# Patient Record
Sex: MALE | Race: WHITE | URBAN - METROPOLITAN AREA
[De-identification: names, ages, dates, MRNs, and addresses within clinical notes are randomized per-mention and may not be internally consistent; named-entity substitution may affect disease eponyms.]

---

## 2017-01-08 DIAGNOSIS — I10 HTN (HYPERTENSION): Primary | ICD-10-CM

## 2017-01-08 DIAGNOSIS — N18.4 CKD (CHRONIC KIDNEY DISEASE) STAGE 4, GFR 15-29 ML/MIN (H): ICD-10-CM

## 2017-01-09 RX ORDER — MINOXIDIL 2.5 MG/1
TABLET ORAL
Qty: 180 TABLET | Refills: 3 | Status: SHIPPED | OUTPATIENT
Start: 2017-01-09 | End: 2018-09-25

## 2017-01-09 NOTE — TELEPHONE ENCOUNTER
Last Office Visit with Nephrologist:  9/21/16.  Medication refilled per Nephrology Clinic protocol.     Heather Kent RN

## 2017-01-13 DIAGNOSIS — N18.4 CKD (CHRONIC KIDNEY DISEASE) STAGE 4, GFR 15-29 ML/MIN (H): Primary | ICD-10-CM

## 2017-01-13 NOTE — NURSING NOTE
Labs per clinic 2A protocol.  Follow up/CKD 4  Last OV: 9/21/16  Flavia Sandoval LPN  Nephrology  [facility]  [phone]

## 2017-01-19 DIAGNOSIS — I10 HTN (HYPERTENSION): Primary | ICD-10-CM

## 2017-01-19 RX ORDER — LOSARTAN POTASSIUM 100 MG/1
100 TABLET ORAL DAILY
Qty: 90 TABLET | Refills: 3 | Status: SHIPPED | OUTPATIENT
Start: 2017-01-19 | End: 2018-01-24

## 2017-01-25 ENCOUNTER — RESULTS ONLY (OUTPATIENT)
Dept: OTHER | Facility: CLINIC | Age: 72
End: 2017-01-25

## 2017-01-25 ENCOUNTER — OFFICE VISIT (OUTPATIENT)
Dept: NEPHROLOGY | Facility: CLINIC | Age: 72
End: 2017-01-25
Attending: INTERNAL MEDICINE
Payer: MEDICARE

## 2017-01-25 VITALS
WEIGHT: 230 LBS | HEIGHT: 71 IN | BODY MASS INDEX: 32.2 KG/M2 | HEART RATE: 76 BPM | DIASTOLIC BLOOD PRESSURE: 73 MMHG | SYSTOLIC BLOOD PRESSURE: 126 MMHG | OXYGEN SATURATION: 100 % | TEMPERATURE: 97.9 F

## 2017-01-25 DIAGNOSIS — N18.4 CKD (CHRONIC KIDNEY DISEASE) STAGE 4, GFR 15-29 ML/MIN (H): Primary | ICD-10-CM

## 2017-01-25 DIAGNOSIS — N18.4 TYPE 2 DIABETES MELLITUS WITH STAGE 4 CHRONIC KIDNEY DISEASE, WITH LONG-TERM CURRENT USE OF INSULIN (H): ICD-10-CM

## 2017-01-25 DIAGNOSIS — E11.22 TYPE 2 DIABETES MELLITUS WITH STAGE 4 CHRONIC KIDNEY DISEASE, WITH LONG-TERM CURRENT USE OF INSULIN (H): ICD-10-CM

## 2017-01-25 DIAGNOSIS — N18.4 CKD (CHRONIC KIDNEY DISEASE) STAGE 4, GFR 15-29 ML/MIN (H): ICD-10-CM

## 2017-01-25 DIAGNOSIS — Z79.4 TYPE 2 DIABETES MELLITUS WITH STAGE 4 CHRONIC KIDNEY DISEASE, WITH LONG-TERM CURRENT USE OF INSULIN (H): ICD-10-CM

## 2017-01-25 DIAGNOSIS — E11.9 DIABETES MELLITUS, TYPE 2 (H): ICD-10-CM

## 2017-01-25 DIAGNOSIS — N18.4 KIDNEY DISEASE, CHRONIC, STAGE IV (GFR 15-29 ML/MIN) (H): ICD-10-CM

## 2017-01-25 LAB
ALBUMIN SERPL-MCNC: 4.2 G/DL (ref 3.4–5)
ANION GAP SERPL CALCULATED.3IONS-SCNC: 13 MMOL/L (ref 3–14)
BUN SERPL-MCNC: 68 MG/DL (ref 7–30)
CALCIUM SERPL-MCNC: 8.7 MG/DL (ref 8.5–10.1)
CHLORIDE SERPL-SCNC: 100 MMOL/L (ref 94–109)
CO2 SERPL-SCNC: 24 MMOL/L (ref 20–32)
CREAT SERPL-MCNC: 3.48 MG/DL (ref 0.66–1.25)
ERYTHROCYTE [DISTWIDTH] IN BLOOD BY AUTOMATED COUNT: 14.7 % (ref 10–15)
GFR SERPL CREATININE-BSD FRML MDRD: 18 ML/MIN/1.7M2
GLUCOSE SERPL-MCNC: 295 MG/DL (ref 70–99)
HCT VFR BLD AUTO: 35.2 % (ref 40–53)
HGB BLD-MCNC: 11.9 G/DL (ref 13.3–17.7)
MCH RBC QN AUTO: 31.6 PG (ref 26.5–33)
MCHC RBC AUTO-ENTMCNC: 33.8 G/DL (ref 31.5–36.5)
MCV RBC AUTO: 94 FL (ref 78–100)
PHOSPHATE SERPL-MCNC: 5.3 MG/DL (ref 2.5–4.5)
PLATELET # BLD AUTO: 213 10E9/L (ref 150–450)
POTASSIUM SERPL-SCNC: 4.2 MMOL/L (ref 3.4–5.3)
RBC # BLD AUTO: 3.76 10E12/L (ref 4.4–5.9)
SODIUM SERPL-SCNC: 136 MMOL/L (ref 133–144)
WBC # BLD AUTO: 6.3 10E9/L (ref 4–11)

## 2017-01-25 PROCEDURE — 85027 COMPLETE CBC AUTOMATED: CPT | Performed by: INTERNAL MEDICINE

## 2017-01-25 PROCEDURE — 80069 RENAL FUNCTION PANEL: CPT | Performed by: INTERNAL MEDICINE

## 2017-01-25 PROCEDURE — 36415 COLL VENOUS BLD VENIPUNCTURE: CPT | Performed by: INTERNAL MEDICINE

## 2017-01-25 PROCEDURE — 86832 HLA CLASS I HIGH DEFIN QUAL: CPT | Performed by: TRANSPLANT SURGERY

## 2017-01-25 PROCEDURE — 99213 OFFICE O/P EST LOW 20 MIN: CPT | Mod: ZF

## 2017-01-25 PROCEDURE — 86833 HLA CLASS II HIGH DEFIN QUAL: CPT | Performed by: TRANSPLANT SURGERY

## 2017-01-25 ASSESSMENT — PAIN SCALES - GENERAL: PAINLEVEL: NO PAIN (0)

## 2017-01-25 NOTE — PROGRESS NOTES
Nephrology follow up    72yo wM with CKD, DM, hypertension here for follow up. No acute issues. Doing well without nausea, vomiting, CP, SOB, anorexia. 4pt ROS otherwise negative.     PMHx  PRINCE - uses CPAP  Hypertension  DM - retinopathy, denies neuropathy  CKD - stage 4 with proteinuria     - biopsy revealed diabetic nephropathy with arteriosclerosis with no evidence of MGUS related dz  MGUS - follows with [facility]  CVA  Anemia - on darbe, Fe    All/adverse effects - dizziness with medications (amlodipine, terazosin, hydralazine, carvedilol).  Medications reviewed - of note  Losartan 100mg daily  Furosemide 120mg BID  Minoxidil 2.5mg BID  Metoprolol 25mg BID  Atorvastatin 20 daily  darbe  Fe sulf 325mg BID  NaHCO3 650 BID  Insulin  No NSAIDs    PCP - Alexus [facility]    Exam   121/72   119/70   126/73   P76  Gen - nad  HEENT - neck supple  CV - rrr, +CARON, no rub  Resp - cta bilat  Ext - trace edema  Psych - pleasant, appropriate  Neuro - no asterixis    Labs noted             2010   2012   2014         2015                        2016  2017             5/10   8/21   2/11   9/22   2/11  7/1  7/28-8/1 11/4 9/21 1/25  Cr         1.6    1.3    2.3    2.5    2.1   2.7   4.9-3.1    3.3    2.7  3.5 (GFR 18)  Ualb/Cr 891    339    Assessment/Recommendations: 72yo wM with CKD and hypertension  CKD - stage 4 with proteinuria due to DM and hypertension. Biopsy did not reveal any MGUS related disease. No acute indication for RRT.   - avoid NSAIDs as possible    Hypertension - mild volume overloaded. BP well controlled (target <140/90 given DM)    - continue current antihypertensives    Anemia - stable    Ca/Phos/PTH - Ca/phos acceptable; Prior PTH elevated, vitamin D low; given polypharmacy and issues with adherence, will hold off for now.    Electrolytes - Na/K nl    RN visit in 3 months for renal panel, evaluate for signs/symptoms of uremia  RTC in 6mo for BP check, renal panel, CBC

## 2017-01-25 NOTE — Clinical Note
1/25/2017       RE: Austin Walker  [address] [address]     Dear Colleague,    Thank you for referring your patient, Austin Walker, to the [facility] NEPHROLOGY at [facility]. Please see a copy of my visit note below.    Nephrology follow up    72yo wM with CKD, DM, hypertension here for follow up. No acute issues. Doing well without nausea, vomiting, CP, SOB, anorexia. 4pt ROS otherwise negative.     PMHx  PRINCE - uses CPAP  Hypertension  DM - retinopathy, denies neuropathy  CKD - stage 4 with proteinuria     - biopsy revealed diabetic nephropathy with arteriosclerosis with no evidence of MGUS related dz  MGUS - follows with [facility]  CVA  Anemia - on darbe, Fe    All/adverse effects - dizziness with medications (amlodipine, terazosin, hydralazine, carvedilol).  Medications reviewed - of note  Losartan 100mg daily  Furosemide 120mg BID  Minoxidil 2.5mg BID  Metoprolol 25mg BID  Atorvastatin 20 daily  darbe  Fe sulf 325mg BID  NaHCO3 650 BID  Insulin  No NSAIDs    PCP - Dr Vivar, [place] [facility]    Exam   121/72   119/70   126/73   P76  Gen - nad  HEENT - neck supple  CV - rrr, +CARON, no rub  Resp - cta bilat  Ext - trace edema  Psych - pleasant, appropriate  Neuro - no asterixis    Labs noted             2010   2012   2014         2015                        2016  2017             5/10   8/21   2/11   9/22   2/11  7/1  7/28-8/1 11/4 9/21 1/25  Cr         1.6    1.3    2.3    2.5    2.1   2.7   4.9-3.1    3.3    2.7  3.5 (GFR 18)  Ualb/Cr 891    339    Assessment/Recommendations: 72yo wM with CKD and hypertension  CKD - stage 4 with proteinuria due to DM and hypertension. Biopsy did not reveal any MGUS related disease. No acute indication for RRT.   - avoid NSAIDs as possible    Hypertension - mild volume overloaded. BP well controlled (target <140/90 given DM)    - continue current antihypertensives    Anemia - stable    Ca/Phos/PTH - Ca/phos  acceptable; Prior PTH elevated, vitamin D low; given polypharmacy and issues with adherence, will hold off for now.    Electrolytes - Na/K nl    RN visit in 3 months for renal panel, evaluate for signs/symptoms of uremia  RTC in 6mo for BP check, renal panel, CBC        Again, thank you for allowing me to participate in the care of your patient.      Sincerely,    Basil Vyas MD

## 2017-01-25 NOTE — MR AVS SNAPSHOT
After Visit Summary   1/25/2017    Austin Walker    MRN: [id number]           Patient Information     Date Of Birth          1945        Visit Information        Provider Department      1/25/2017 1:30 PM Basil Vyas MD [facility] Nephrology        Today's Diagnoses     CKD (chronic kidney disease) stage 4, GFR 15-29 ml/min (H)    -  1     Type 2 diabetes mellitus with stage 4 chronic kidney disease, with long-term current use of insulin (H)            Follow-ups after your visit        Follow-up notes from your care team     Return in about 6 months (around 7/25/2017).      Your next 10 appointments already scheduled     Jan 25, 2017 12:30 PM   Lab with  LAB    [facility] ([facility])    [address] [place] [address]  [address] [facility] [address] [phone]            Jan 25, 2017  1:30 PM   (Arrive by 1:00 PM)   Return Visit with Basil Vyas MD   [facility] Nephrology ([facility])    [address] [place] [address] [phone]            Apr 26, 2017 10:30 AM   Nurse Visit with  NEPHROLOGY NURSE   [facility] Solid Organ Transplant ([facility])    [address] [place] [address] [phone]            Jul 26, 2017 12:00 PM   Lab with  LAB   [facility] ([facility])    [address] [place] [address] [phone]            Jul 26, 2017  1:00 PM   (Arrive by 12:30 PM)   Return Visit with Basil Vyas MD   [facility] Nephrology ([facility])    [address] [place] [address]  [address] [facility] [address] [phone]              Who to contact     If you have questions or need follow up information about today's clinic visit or your schedule please contact [facility] NEPHROLOGY directly at [phone].  Normal or non-critical lab and imaging results will be  "communicated to you by HappyBoxhart, letter or phone within 4 business days after the clinic has received the results. If you do not hear from us within 7 days, please contact the clinic through [employer] or phone. If you have a critical or abnormal lab result, we will notify you by phone as soon as possible.  Submit refill requests through [employer] or call your pharmacy and they will forward the refill request to us. Please allow 3 business days for your refill to be completed.          Additional Information About Your Visit        [employer] Information     [employer] gives you secure access to your electronic health record. If you see a primary care provider, you can also send messages to your care team and make appointments. If you have questions, please call your primary care clinic.  If you do not have a primary care provider, please call [phone] and they will assist you.        Care EveryWhere ID     This is your Care EveryWhere ID. This could be used by other organizations to access your [place] medical records  [id number]        Your Vitals Were     Pulse Temperature Height BMI (Body Mass Index) Pulse Oximetry       76 97.9  F (36.6  C) (Oral) 1.803 m (5' 11\") 32.09 kg/m2 100%        Blood Pressure from Last 3 Encounters:   01/25/17 126/73   11/28/16 139/70   09/21/16 127/74    Weight from Last 3 Encounters:   01/25/17 104.327 kg (230 lb)   09/21/16 103.783 kg (228 lb 12.8 oz)   06/27/16 105.507 kg (232 lb 9.6 oz)              Today, you had the following     No orders found for display         Today's Medication Changes          These changes are accurate as of: 1/25/17 12:21 PM.  If you have any questions, ask your nurse or doctor.               These medicines have changed or have updated prescriptions.        Dose/Directions    insulin detemir 100 UNIT/ML injection   Commonly known as:  LEVEMIR FLEXPEN/FLEXTOUCH   This may have changed:    - how much to take  - additional instructions   Used for:  Diabetes " mellitus, type 2 (H)        Dose:  10 Units   Inject 10 Units Subcutaneous 2 times daily Dose was originally 50 units BID but pt has been cutting back   Refills:  0                Primary Care Provider Office Phone # Fax #    Celso Esparza [phone]       [facility] [address]  [place] [facility] [address]        Thank you!     Thank you for choosing [facility] NEPHROLOGY  for your care. Our goal is always to provide you with excellent care. Hearing back from our patients is one way we can continue to improve our services. Please take a few minutes to complete the written survey that you may receive in the mail after your visit with us. Thank you!             Your Updated Medication List - Protect others around you: Learn how to safely use, store and throw away your medicines at www.disposemymeds.org.          This list is accurate as of: 1/25/17 12:21 PM.  Always use your most recent med list.                   Brand Name Dispense Instructions for use    amphetamine-dextroamphetamine 10 MG per tablet    ADDERALL     Take 10 mg by mouth 3 times daily (before meals)       ARANESP (ALBUMIN FREE) 300 MCG/ML Soln   Generic drug:  darbepoetin aaliyah-polysorbate      Inject 300 mcg Subcutaneous every 14 days As needed for hgb <=11g/dL       ASPIRIN EC PO      Take 81 mg by mouth daily       atorvastatin 20 MG tablet    LIPITOR     Take 20 mg by mouth every evening 1700       blood glucose monitoring lancets      3x/day with blood glucose testing       ferrous sulfate 325 (65 FE) MG tablet    IRON     Take 325 mg by mouth 2 times daily       FLONASE 50 MCG/ACT spray   Generic drug:  fluticasone      Spray 1 spray into both nostrils 2 times daily as needed       furosemide 40 MG tablet    LASIX    180 tablet    Take 3 tablets (120 mg) by mouth 2 times daily       gabapentin 300 MG capsule    NEURONTIN     Take 300 mg by mouth daily He takes 300mg about every 3 days        11/11/15  Pt  states he takes 300mg 2x/day       GLUCOSAMINE SULFATE PO      Take by mouth daily       insulin detemir 100 UNIT/ML injection    LEVEMIR FLEXPEN/FLEXTOUCH     Inject 10 Units Subcutaneous 2 times daily Dose was originally 50 units BID but pt has been cutting back       loratadine 10 MG tablet    CLARITIN     Take 10 mg by mouth daily       losartan 100 MG tablet    COZAAR    90 tablet    Take 1 tablet (100 mg) by mouth daily       meclizine 25 MG tablet    ANTIVERT     Take 25 mg by mouth 2 times daily       metoprolol 25 MG tablet    LOPRESSOR    180 tablet    Take 1 tablet (25 mg) by mouth 2 times daily       minoxidil 2.5 MG tablet    LONITEN    180 tablet    TAKE 2 TABLETS BY MOUTH DAILY.       MULTI-VITAMINS Tabs      Take 1 tablet by mouth daily       NUVIGIL PO      Take 200 mg by mouth every morning       omeprazole 20 MG CR capsule    priLOSEC     Take 20 mg by mouth daily       OSTEO BI-FLEX ADV TRIPLE ST PO      Take by mouth daily       sodium bicarbonate 650 MG tablet     120 tablet    Take 1 tablet (650 mg) by mouth 2 times daily       traZODone 50 MG tablet    DESYREL     Take 50 mg by mouth At Bedtime       UNABLE TO FIND      Take by mouth daily MEDICATION NAME: Brain Peak

## 2017-01-25 NOTE — NURSING NOTE
"Chief Complaint   Patient presents with     RECHECK     4 mo Ckd stage 4 follow up       Initial /73 mmHg  Pulse 76  Temp(Src) 97.9  F (36.6  C) (Oral)  Ht 1.803 m (5' 11\")  Wt 104.327 kg (230 lb)  BMI 32.09 kg/m2  SpO2 100% Estimated body mass index is 32.09 kg/(m^2) as calculated from the following:    Height as of this encounter: 1.803 m (5' 11\").    Weight as of this encounter: 104.327 kg (230 lb).  BP completed using cuff size: regular    "

## 2017-01-26 LAB — PRA SINGLE ANTIGEN IGG ANTIBODY: NORMAL

## 2017-01-31 LAB
SA1 CELL: NORMAL
SA1 COMMENTS: NORMAL
SA1 HI RISK ABY: NORMAL
SA1 MOD RISK ABY: NORMAL
SA1 TEST METHOD: NORMAL
SA2 CELL: NORMAL
SA2 COMMENTS: NORMAL
SA2 HI RISK ABY UA: NORMAL
SA2 MOD RISK ABY: NORMAL
SA2 TEST METHOD: NORMAL

## 2017-02-14 ENCOUNTER — TELEPHONE (OUTPATIENT)
Dept: NEPHROLOGY | Facility: CLINIC | Age: 72
End: 2017-02-14

## 2017-02-14 DIAGNOSIS — N18.4 CKD (CHRONIC KIDNEY DISEASE) STAGE 4, GFR 15-29 ML/MIN (H): ICD-10-CM

## 2017-02-14 DIAGNOSIS — I15.9 SECONDARY HYPERTENSION, UNSPECIFIED: ICD-10-CM

## 2017-02-14 RX ORDER — FUROSEMIDE 40 MG
120 TABLET ORAL 2 TIMES DAILY
Qty: 180 TABLET | Refills: 3 | Status: SHIPPED | OUTPATIENT
Start: 2017-02-14 | End: 2017-04-21

## 2017-02-14 NOTE — TELEPHONE ENCOUNTER
Medication refill per Clinic 2A protocol.  Furosemide  Associated DX: CKD 4  Last OV: 7/26/16  Next appointment: 1/25/17  Flavia Sandoval LPN  Nephrology  [facility]  [phone]

## 2017-03-10 ENCOUNTER — CARE COORDINATION (OUTPATIENT)
Dept: NEPHROLOGY | Facility: CLINIC | Age: 72
End: 2017-03-10

## 2017-03-22 ENCOUNTER — CARE COORDINATION (OUTPATIENT)
Dept: NEPHROLOGY | Facility: CLINIC | Age: 72
End: 2017-03-22

## 2017-03-23 NOTE — PROGRESS NOTES
Reason for Call    Checked in with patient on BP and symptoms. Currently does not check BP at home. Said he had a neck surgery yesterday (had fallen and broken a bone). Has swelling in the neck. Otherwise denied chest pain, shortness of breath, edema, or other uremic symptoms at this time.    Plan    1. Follow up at nurse visit as scheduled on 4/26  2. Call if any new symptoms    Patient was given an opportunity to ask questions and have those questions answered to his satisfaction.  Patient verbalized understanding of instructions provided and agreed to plan of care.    Heather Kent RN

## 2017-04-19 ENCOUNTER — ALLIED HEALTH/NURSE VISIT (OUTPATIENT)
Dept: TRANSPLANT | Facility: CLINIC | Age: 72
End: 2017-04-19
Attending: INTERNAL MEDICINE
Payer: MEDICARE

## 2017-04-19 VITALS — HEART RATE: 62 BPM | SYSTOLIC BLOOD PRESSURE: 129 MMHG | DIASTOLIC BLOOD PRESSURE: 71 MMHG

## 2017-04-19 DIAGNOSIS — I15.9 SECONDARY HYPERTENSION, UNSPECIFIED: ICD-10-CM

## 2017-04-19 DIAGNOSIS — N18.4 CKD (CHRONIC KIDNEY DISEASE) STAGE 4, GFR 15-29 ML/MIN (H): ICD-10-CM

## 2017-04-19 DIAGNOSIS — N18.4 CKD (CHRONIC KIDNEY DISEASE) STAGE 4, GFR 15-29 ML/MIN (H): Primary | ICD-10-CM

## 2017-04-19 LAB
ALBUMIN SERPL-MCNC: 3.6 G/DL (ref 3.4–5)
ANION GAP SERPL CALCULATED.3IONS-SCNC: 10 MMOL/L (ref 3–14)
BUN SERPL-MCNC: 51 MG/DL (ref 7–30)
CALCIUM SERPL-MCNC: 8.6 MG/DL (ref 8.5–10.1)
CHLORIDE SERPL-SCNC: 110 MMOL/L (ref 94–109)
CO2 SERPL-SCNC: 23 MMOL/L (ref 20–32)
CREAT SERPL-MCNC: 2.64 MG/DL (ref 0.66–1.25)
GFR SERPL CREATININE-BSD FRML MDRD: 24 ML/MIN/1.7M2
GLUCOSE SERPL-MCNC: 195 MG/DL (ref 70–99)
PHOSPHATE SERPL-MCNC: 3.2 MG/DL (ref 2.5–4.5)
POTASSIUM SERPL-SCNC: 4 MMOL/L (ref 3.4–5.3)
SODIUM SERPL-SCNC: 143 MMOL/L (ref 133–144)

## 2017-04-19 PROCEDURE — 36415 COLL VENOUS BLD VENIPUNCTURE: CPT | Performed by: INTERNAL MEDICINE

## 2017-04-19 PROCEDURE — 80069 RENAL FUNCTION PANEL: CPT | Performed by: INTERNAL MEDICINE

## 2017-04-19 NOTE — NURSING NOTE
Reason for Visit    Patient seen at the request of Dr. Vyas for BP check, labs, and uremic follow up.    Average BP today was 129/71 with a pulse of 62. Said he's doing very well. He is wearing a neck brace following injury, but denied pain or swelling. Denied any uremic symptoms at this time.    Wanted to know if he could cut down dose of lasix (120mg BID) as he has no swelling. Also prescribed losartan 100mg daily, minoxidil 5mg daily, and metoprolol 25mg BID.    Medication Review    Medication review completed. Taking all medications as prescribed.      Collaboration    Above discussed with Dr. Vyas.  Orders received.      Seems reasonable to go down to lasix 80mg BID.    Patient Education    1. Uremic symptoms and when to call clinic  2. Call this nurse if systolic (top number) blood pressure is consistently <110 or >160 for further instructions.     Plan    1. Decrease lasix to 80mg BID  2. Follow up with Dr. Vyas as scheduled.     Amount of time spent with patient 25 minutes.    Patient was given an opportunity to ask questions and have those questions answered to his satisfaction.  Patient verbalized understanding of instructions provided and agreed to plan of care.    Heather Kent RN

## 2017-04-19 NOTE — MR AVS SNAPSHOT
After Visit Summary   4/19/2017    Austin Walker    MRN: [id number]           Patient Information     Date Of Birth          1945        Visit Information        Provider Department      4/19/2017 10:30 AM  NEPHROLOGY NURSE [facility] Solid Organ Transplant        Today's Diagnoses     CKD (chronic kidney disease) stage 4, GFR 15-29 ml/min (H)        Secondary hypertension, unspecified           Follow-ups after your visit        Your next 10 appointments already scheduled     Jul 26, 2017 12:00 PM CDT   Lab with  LAB   [facility] Lab ([facility])    [address] [place] [address] [phone]            Jul 26, 2017  1:00 PM CDT   (Arrive by 12:30 PM)   Return Visit with Basil Vyas MD   [facility] Nephrology ([facility])    [address] [place] [address] [phone]              Who to contact     If you have questions or need follow up information about today's clinic visit or your schedule please contact [facility] directly at [phone].  Normal or non-critical lab and imaging results will be communicated to you by [employer], letter or phone within 4 business days after the clinic has received the results. If you do not hear from us within 7 days, please contact the clinic through [employer] or phone. If you have a critical or abnormal lab result, we will notify you by phone as soon as possible.  Submit refill requests through [employer] or call your pharmacy and they will forward the refill request to us. Please allow 3 business days for your refill to be completed.          Additional Information About Your Visit        MyChart Information     [employer] gives you secure access to your electronic health record. If you see a primary care provider, you can also send messages to your care team and make appointments. If you have questions, please call your primary  care clinic.  If you do not have a primary care provider, please call [phone] and they will assist you.        Care EveryWhere ID     This is your Care EveryWhere ID. This could be used by other organizations to access your [place] medical records  [id number]        Your Vitals Were     Pulse                   62            Blood Pressure from Last 3 Encounters:   04/19/17 129/71   01/25/17 126/73   11/28/16 139/70    Weight from Last 3 Encounters:   01/25/17 104.3 kg (230 lb)   09/21/16 103.8 kg (228 lb 12.8 oz)   06/27/16 105.5 kg (232 lb 9.6 oz)              Today, you had the following     No orders found for display         Today's Medication Changes          These changes are accurate as of: 4/19/17 11:59 PM.  If you have any questions, ask your nurse or doctor.               These medicines have changed or have updated prescriptions.        Dose/Directions    furosemide 40 MG tablet   Commonly known as:  LASIX   This may have changed:  how much to take   Used for:  CKD (chronic kidney disease) stage 4, GFR 15-29 ml/min (H), Secondary hypertension, unspecified        Dose:  80 mg   Take 2 tablets (80 mg) by mouth 2 times daily   Quantity:  360 tablet   Refills:  3       insulin detemir 100 UNIT/ML injection   Commonly known as:  LEVEMIR FLEXPEN/FLEXTOUCH   This may have changed:    - how much to take  - additional instructions   Used for:  Diabetes mellitus, type 2 (H)        Dose:  10 Units   Inject 10 Units Subcutaneous 2 times daily Dose was originally 50 units BID but pt has been cutting back   Refills:  0            Where to get your medicines      These medications were sent to [facility] - [facility] [address] AT [facility] [address]  [address], [facility] [address]     Phone:  [phone]     furosemide 40 MG tablet                Primary Care Provider Office Phone # Fax #    Celso Esparza [phone]        [facility] [address]  [place] [address]        Thank you!     Thank you for choosing [facility]  for your care. Our goal is always to provide you with excellent care. Hearing back from our patients is one way we can continue to improve our services. Please take a few minutes to complete the written survey that you may receive in the mail after your visit with us. Thank you!             Your Updated Medication List - Protect others around you: Learn how to safely use, store and throw away your medicines at www.disposemymeds.org.          This list is accurate as of: 4/19/17 11:59 PM.  Always use your most recent med list.                   Brand Name Dispense Instructions for use    amphetamine-dextroamphetamine 10 MG per tablet    ADDERALL     Take 10 mg by mouth 3 times daily (before meals)       ARANESP (ALBUMIN FREE) 300 MCG/ML Soln   Generic drug:  darbepoetin aaliyah-polysorbate      Inject 300 mcg Subcutaneous every 14 days As needed for hgb <=11g/dL       ASPIRIN EC PO      Take 81 mg by mouth daily       atorvastatin 20 MG tablet    LIPITOR     Take 20 mg by mouth every evening 1700       blood glucose monitoring lancets      3x/day with blood glucose testing       ferrous sulfate 325 (65 FE) MG tablet    IRON     Take 325 mg by mouth 2 times daily       FLONASE 50 MCG/ACT spray   Generic drug:  fluticasone      Spray 1 spray into both nostrils 2 times daily as needed       furosemide 40 MG tablet    LASIX    360 tablet    Take 2 tablets (80 mg) by mouth 2 times daily       gabapentin 300 MG capsule    NEURONTIN     Take 300 mg by mouth daily He takes 300mg about every 3 days        11/11/15  Pt states he takes 300mg 2x/day       GLUCOSAMINE SULFATE PO      Take by mouth daily       insulin detemir 100 UNIT/ML injection    LEVEMIR FLEXPEN/FLEXTOUCH     Inject 10 Units Subcutaneous 2 times daily Dose was originally 50 units BID but pt has been cutting back        loratadine 10 MG tablet    CLARITIN     Take 10 mg by mouth daily       losartan 100 MG tablet    COZAAR    90 tablet    Take 1 tablet (100 mg) by mouth daily       meclizine 25 MG tablet    ANTIVERT     Take 25 mg by mouth 2 times daily       metoprolol 25 MG tablet    LOPRESSOR    180 tablet    Take 1 tablet (25 mg) by mouth 2 times daily       minoxidil 2.5 MG tablet    LONITEN    180 tablet    TAKE 2 TABLETS BY MOUTH DAILY.       MULTI-VITAMINS Tabs      Take 1 tablet by mouth daily       NUVIGIL PO      Take 200 mg by mouth every morning       omeprazole 20 MG CR capsule    priLOSEC     Take 20 mg by mouth daily       OSTEO BI-FLEX ADV TRIPLE ST PO      Take by mouth daily       sodium bicarbonate 650 MG tablet     120 tablet    Take 1 tablet (650 mg) by mouth 2 times daily       traZODone 50 MG tablet    DESYREL     Take 50 mg by mouth At Bedtime       UNABLE TO FIND      Take by mouth daily MEDICATION NAME: Brain Peak

## 2017-04-21 RX ORDER — FUROSEMIDE 40 MG
80 TABLET ORAL 2 TIMES DAILY
Qty: 360 TABLET | Refills: 3 | Status: SHIPPED | OUTPATIENT
Start: 2017-04-21 | End: 2017-05-23

## 2017-05-23 ENCOUNTER — CARE COORDINATION (OUTPATIENT)
Dept: NEPHROLOGY | Facility: CLINIC | Age: 72
End: 2017-05-23

## 2017-05-23 DIAGNOSIS — I15.9 SECONDARY HYPERTENSION, UNSPECIFIED: ICD-10-CM

## 2017-05-23 DIAGNOSIS — N18.4 CKD (CHRONIC KIDNEY DISEASE) STAGE 4, GFR 15-29 ML/MIN (H): ICD-10-CM

## 2017-05-23 RX ORDER — FUROSEMIDE 20 MG
60 TABLET ORAL 2 TIMES DAILY
Qty: 540 TABLET | Refills: 3 | Status: SHIPPED | OUTPATIENT
Start: 2017-05-23 | End: 2017-12-20

## 2017-05-23 NOTE — PROGRESS NOTES
Reason for Call    Spoke with patient. States he's been okay following lasix decrease. Weight is stable around 233lbs. Average BP is 120's / 70's with pulse in the 60's. Notes urinary frequency, but is otherwise asymptomatic. Wondering if he can decrease lasix further.    Currently taking lasix 80mg BID, losartan 10mg daily, minoxidil 5mg daily, and metoprolol 25mg BID.    Collaboration    Above discussed with Dr. Vyas.  Orders received.    60mg BID would be ok. We could check labs again in June.     Patient Education    1. Uremic symptoms and when to call clinic.    Plan    1. Decrease lasix to 60mg BID  2. Repeat renal panel in June    Patient was given an opportunity to ask questions and have those questions answered to his satisfaction.  Patient verbalized understanding of instructions provided and agreed to plan of care.    Heather Kent RN

## 2017-06-08 ENCOUNTER — CARE COORDINATION (OUTPATIENT)
Dept: NEPHROLOGY | Facility: CLINIC | Age: 72
End: 2017-06-08

## 2017-06-08 NOTE — PROGRESS NOTES
Attempted to contact patient to follow up on BP. Line picked up but no one was there. Will try again at a later time.    Heather Kent RN

## 2017-06-09 NOTE — PROGRESS NOTES
Reason for Call    Spoke with patient. States he's been doing well following lasix decrease. No change in symptoms. Denied any swelling. BP steady around 120/70. He will have labs done this month.    Patient Education    1. Uremic symptoms and when to call clinic    Plan    1. Repeat BMP  2. Follow up in 3 weeks    Patient was given an opportunity to ask questions and have those questions answered to his satisfaction.  Patient verbalized understanding of instructions provided and agreed to plan of care.    Heather Kent RN

## 2017-06-22 DIAGNOSIS — I10 HYPERTENSION: ICD-10-CM

## 2017-06-22 DIAGNOSIS — N18.4 CKD (CHRONIC KIDNEY DISEASE) STAGE 4, GFR 15-29 ML/MIN (H): ICD-10-CM

## 2017-06-22 RX ORDER — MINOXIDIL 2.5 MG/1
TABLET ORAL
Qty: 60 TABLET | Refills: 11 | Status: SHIPPED | OUTPATIENT
Start: 2017-06-22 | End: 2018-08-22

## 2017-06-22 NOTE — TELEPHONE ENCOUNTER
Last Office Visit with Nephrologist:  1/25/17.  Medication refilled per Nephrology Clinic protocol.     Heather Kent RN

## 2017-06-30 ENCOUNTER — CARE COORDINATION (OUTPATIENT)
Dept: NEPHROLOGY | Facility: CLINIC | Age: 72
End: 2017-06-30

## 2017-06-30 NOTE — PROGRESS NOTES
Reason for Call    Patient states he's still doing well. BP steady around 120/70. Denied any symptoms at this time. Reminded him to have BMP drawn, he will try to go on Monday. Will continue to monitor    Plan    1. Repeat BMP  2. Follow up in 3-4 weeks    Patient was given an opportunity to ask questions and have those questions answered to his satisfaction.  Patient verbalized understanding of instructions provided and agreed to plan of care.    Heather Kent RN

## 2017-07-03 DIAGNOSIS — N18.4 CKD (CHRONIC KIDNEY DISEASE) STAGE 4, GFR 15-29 ML/MIN (H): ICD-10-CM

## 2017-07-03 DIAGNOSIS — I15.9 SECONDARY HYPERTENSION, UNSPECIFIED: ICD-10-CM

## 2017-07-03 LAB
ALBUMIN SERPL-MCNC: 3.8 G/DL (ref 3.4–5)
ANION GAP SERPL CALCULATED.3IONS-SCNC: 8 MMOL/L (ref 3–14)
BUN SERPL-MCNC: 46 MG/DL (ref 7–30)
CALCIUM SERPL-MCNC: 8.9 MG/DL (ref 8.5–10.1)
CHLORIDE SERPL-SCNC: 109 MMOL/L (ref 94–109)
CO2 SERPL-SCNC: 22 MMOL/L (ref 20–32)
CREAT SERPL-MCNC: 2.85 MG/DL (ref 0.66–1.25)
GFR SERPL CREATININE-BSD FRML MDRD: 22 ML/MIN/1.7M2
GLUCOSE SERPL-MCNC: 238 MG/DL (ref 70–99)
PHOSPHATE SERPL-MCNC: 3.3 MG/DL (ref 2.5–4.5)
POTASSIUM SERPL-SCNC: 4.1 MMOL/L (ref 3.4–5.3)
SODIUM SERPL-SCNC: 139 MMOL/L (ref 133–144)

## 2017-07-28 ENCOUNTER — CARE COORDINATION (OUTPATIENT)
Dept: NEPHROLOGY | Facility: CLINIC | Age: 72
End: 2017-07-28

## 2017-07-31 DIAGNOSIS — N18.4 CKD (CHRONIC KIDNEY DISEASE) STAGE 4, GFR 15-29 ML/MIN (H): Primary | ICD-10-CM

## 2017-07-31 NOTE — NURSING NOTE
Labs per clinic 2A protocol.  Follow up/CKD 4  Last OV: 1/25/17  Flavia Sandoval LPN  Nephrology  [facility]  [phone]

## 2017-08-04 DIAGNOSIS — N18.6 END STAGE RENAL DISEASE (H): ICD-10-CM

## 2017-08-04 DIAGNOSIS — Z76.82 ORGAN TRANSPLANT CANDIDATE: Primary | ICD-10-CM

## 2017-08-09 ENCOUNTER — RESULTS ONLY (OUTPATIENT)
Dept: OTHER | Facility: CLINIC | Age: 72
End: 2017-08-09

## 2017-08-09 ENCOUNTER — OFFICE VISIT (OUTPATIENT)
Dept: NEPHROLOGY | Facility: CLINIC | Age: 72
End: 2017-08-09
Attending: INTERNAL MEDICINE
Payer: MEDICARE

## 2017-08-09 VITALS
OXYGEN SATURATION: 98 % | HEIGHT: 71 IN | WEIGHT: 214.6 LBS | SYSTOLIC BLOOD PRESSURE: 130 MMHG | HEART RATE: 76 BPM | DIASTOLIC BLOOD PRESSURE: 73 MMHG | BODY MASS INDEX: 30.04 KG/M2 | TEMPERATURE: 98.1 F

## 2017-08-09 DIAGNOSIS — N18.6 END STAGE RENAL DISEASE (H): ICD-10-CM

## 2017-08-09 DIAGNOSIS — N18.4 CKD (CHRONIC KIDNEY DISEASE) STAGE 4, GFR 15-29 ML/MIN (H): ICD-10-CM

## 2017-08-09 DIAGNOSIS — Z79.4 TYPE 2 DIABETES MELLITUS WITH STAGE 4 CHRONIC KIDNEY DISEASE, WITH LONG-TERM CURRENT USE OF INSULIN (H): ICD-10-CM

## 2017-08-09 DIAGNOSIS — N18.4 CKD (CHRONIC KIDNEY DISEASE) STAGE 4, GFR 15-29 ML/MIN (H): Primary | ICD-10-CM

## 2017-08-09 DIAGNOSIS — Z76.82 ORGAN TRANSPLANT CANDIDATE: ICD-10-CM

## 2017-08-09 DIAGNOSIS — N18.4 TYPE 2 DIABETES MELLITUS WITH STAGE 4 CHRONIC KIDNEY DISEASE, WITH LONG-TERM CURRENT USE OF INSULIN (H): ICD-10-CM

## 2017-08-09 DIAGNOSIS — E11.22 TYPE 2 DIABETES MELLITUS WITH STAGE 4 CHRONIC KIDNEY DISEASE, WITH LONG-TERM CURRENT USE OF INSULIN (H): ICD-10-CM

## 2017-08-09 LAB
ALBUMIN SERPL-MCNC: 3.6 G/DL (ref 3.4–5)
ANION GAP SERPL CALCULATED.3IONS-SCNC: 10 MMOL/L (ref 3–14)
BUN SERPL-MCNC: 46 MG/DL (ref 7–30)
CALCIUM SERPL-MCNC: 8.4 MG/DL (ref 8.5–10.1)
CHLORIDE SERPL-SCNC: 108 MMOL/L (ref 94–109)
CO2 SERPL-SCNC: 18 MMOL/L (ref 20–32)
CREAT SERPL-MCNC: 2.77 MG/DL (ref 0.66–1.25)
ERYTHROCYTE [DISTWIDTH] IN BLOOD BY AUTOMATED COUNT: 13.5 % (ref 10–15)
FERRITIN SERPL-MCNC: 285 NG/ML (ref 26–388)
GFR SERPL CREATININE-BSD FRML MDRD: 23 ML/MIN/1.7M2
GLUCOSE SERPL-MCNC: 345 MG/DL (ref 70–99)
HCT VFR BLD AUTO: 29.4 % (ref 40–53)
HGB BLD-MCNC: 9.9 G/DL (ref 13.3–17.7)
IRON SATN MFR SERPL: 50 % (ref 15–46)
IRON SERPL-MCNC: 96 UG/DL (ref 35–180)
MCH RBC QN AUTO: 31.9 PG (ref 26.5–33)
MCHC RBC AUTO-ENTMCNC: 33.7 G/DL (ref 31.5–36.5)
MCV RBC AUTO: 95 FL (ref 78–100)
PHOSPHATE SERPL-MCNC: 2.6 MG/DL (ref 2.5–4.5)
PLATELET # BLD AUTO: 199 10E9/L (ref 150–450)
POTASSIUM SERPL-SCNC: 4.7 MMOL/L (ref 3.4–5.3)
RBC # BLD AUTO: 3.1 10E12/L (ref 4.4–5.9)
SODIUM SERPL-SCNC: 137 MMOL/L (ref 133–144)
TIBC SERPL-MCNC: 191 UG/DL (ref 240–430)
WBC # BLD AUTO: 3.9 10E9/L (ref 4–11)

## 2017-08-09 PROCEDURE — 86833 HLA CLASS II HIGH DEFIN QUAL: CPT | Performed by: TRANSPLANT SURGERY

## 2017-08-09 PROCEDURE — 83550 IRON BINDING TEST: CPT | Performed by: INTERNAL MEDICINE

## 2017-08-09 PROCEDURE — 99213 OFFICE O/P EST LOW 20 MIN: CPT | Mod: ZF

## 2017-08-09 PROCEDURE — 83540 ASSAY OF IRON: CPT | Performed by: INTERNAL MEDICINE

## 2017-08-09 PROCEDURE — 86832 HLA CLASS I HIGH DEFIN QUAL: CPT | Performed by: TRANSPLANT SURGERY

## 2017-08-09 PROCEDURE — 80069 RENAL FUNCTION PANEL: CPT | Performed by: INTERNAL MEDICINE

## 2017-08-09 PROCEDURE — 82728 ASSAY OF FERRITIN: CPT | Performed by: INTERNAL MEDICINE

## 2017-08-09 PROCEDURE — 85027 COMPLETE CBC AUTOMATED: CPT | Performed by: INTERNAL MEDICINE

## 2017-08-09 PROCEDURE — 36415 COLL VENOUS BLD VENIPUNCTURE: CPT | Performed by: INTERNAL MEDICINE

## 2017-08-09 RX ORDER — ZOLPIDEM TARTRATE 5 MG/1
5 TABLET ORAL
COMMUNITY
Start: 2017-07-26 | End: 2018-08-22

## 2017-08-09 ASSESSMENT — PAIN SCALES - GENERAL: PAINLEVEL: NO PAIN (0)

## 2017-08-09 NOTE — LETTER
8/9/2017      RE: Austin Walker  [address] [facility] [address]       Nephrology follow up    70yo wM with CKD, DM, hypertension here for follow up. Doing well without N/V, anorexia, CP, SOB, F/C. 4pt ROS otherwise negative.     PMHx  PRINCE - uses CPAP  Hypertension  DM - retinopathy, denies neuropathy  CKD - stage 4 with proteinuria     - biopsy revealed diabetic nephropathy with arteriosclerosis with no evidence of MGUS related dz  MGUS - follows with [facility]  CVA  Anemia - on darbe, Fe    All/adverse effects - dizziness with medications (amlodipine, terazosin, hydralazine, carvedilol).  Medications reviewed - of note  Losartan 100mg daily  Furosemide 60mg BID  Minoxidil 2.5mg BID  Metoprolol 25mg BID  Atorvastatin 20 daily  darbe  Fe sulf 325mg BID  NaHCO3 650 BID - not taking, not sure why he needed it  Insulin  No NSAIDs    PCP - Dr Vivar, [facility]    Exam   131/70   143/73   130/73   P76   98.1  Gen - nad  HEENT - neck supple  CV - rrr, +CARON, no rub  Resp - cta bilat  Ext - 1+ edema  Psych - pleasant, appropriate    Labs noted             2010   2012   2014     2015                        2016  2017             5/10   8/21   2/11   2/11  7/1  7/28-8/1 11/4 9/21 1/25 8/9  Cr         1.6    1.3    2.3    2.1   2.7   4.9-3.1    3.3    2.7  3.5  2.8 (GFR 23)  Ualb/Cr 891    339    Assessment/Recommendations: 70yo wM with CKD and hypertension  CKD - stage 4 with proteinuria due to DM and hypertension. Biopsy did not reveal any MGUS related disease. No acute indication for RRT.   - avoid NSAIDs as possible    Hypertension - mild volume overloaded. BP well controlled (target <140/90 given DM)    - continue current antihypertensives    Anemia - mgmt per anemia clinic.    Ca/Phos/PTH - Ca/phos acceptable; Prior PTH elevated, vitamin D low; given polypharmacy and issues with adherence, will hold off for now.    Electrolytes - Na/K nl    Acidosis - bicarb low, off replacement;  communicated rationale for bicarb supplements and encouraged him to restart    RTC in 4mo for BP check, renal panel, CBC    Basil Vyas MD

## 2017-08-09 NOTE — NURSING NOTE
"Chief Complaint   Patient presents with     RECHECK     Follow up CKD stage 4       Initial /73  Pulse 76  Temp 98.1  F (36.7  C) (Oral)  Ht 1.803 m (5' 11\")  Wt 97.3 kg (214 lb 9.6 oz)  SpO2 98%  BMI 29.93 kg/m2 Estimated body mass index is 29.93 kg/(m^2) as calculated from the following:    Height as of this encounter: 1.803 m (5' 11\").    Weight as of this encounter: 97.3 kg (214 lb 9.6 oz).  Medication Reconciliation: complete   Vanessa Carrasco., CMA    "

## 2017-08-09 NOTE — PROGRESS NOTES
Nephrology follow up    70yo wM with CKD, DM, hypertension here for follow up. Doing well without N/V, anorexia, CP, SOB, F/C. 4pt ROS otherwise negative.     PMHx  PRINCE - uses CPAP  Hypertension  DM - retinopathy, denies neuropathy  CKD - stage 4 with proteinuria     - biopsy revealed diabetic nephropathy with arteriosclerosis with no evidence of MGUS related dz  MGUS - follows with [facility]  CVA  Anemia - on darbe, Fe    All/adverse effects - dizziness with medications (amlodipine, terazosin, hydralazine, carvedilol).  Medications reviewed - of note  Losartan 100mg daily  Furosemide 60mg BID  Minoxidil 2.5mg BID  Metoprolol 25mg BID  Atorvastatin 20 daily  darbe  Fe sulf 325mg BID  NaHCO3 650 BID - not taking, not sure why he needed it  Insulin  No NSAIDs    PCP - [facility]    Exam   131/70   143/73   130/73   P76   98.1  Gen - nad  HEENT - neck supple  CV - rrr, +CARON, no rub  Resp - cta bilat  Ext - 1+ edema  Psych - pleasant, appropriate    Labs noted             2010   2012   2014     2015                        2016  2017             5/10   8/21   2/11   2/11  7/1  7/28-8/1 11/4 9/21 1/25 8/9  Cr         1.6    1.3    2.3    2.1   2.7   4.9-3.1    3.3    2.7  3.5  2.8 (GFR 23)  Ualb/Cr 891    339    Assessment/Recommendations: 70yo wM with CKD and hypertension  CKD - stage 4 with proteinuria due to DM and hypertension. Biopsy did not reveal any MGUS related disease. No acute indication for RRT.   - avoid NSAIDs as possible    Hypertension - mild volume overloaded. BP well controlled (target <140/90 given DM)    - continue current antihypertensives    Anemia - mgmt per anemia clinic.    Ca/Phos/PTH - Ca/phos acceptable; Prior PTH elevated, vitamin D low; given polypharmacy and issues with adherence, will hold off for now.    Electrolytes - Na/K nl    Acidosis - bicarb low, off replacement; communicated rationale for bicarb supplements and encouraged him to restart    RTC in 4mo for  BP check, renal panel, CBC

## 2017-08-09 NOTE — MR AVS SNAPSHOT
After Visit Summary   8/9/2017    Austin Walker    MRN: [id number]           Patient Information     Date Of Birth          1945        Visit Information        Provider Department      8/9/2017 2:00 PM Basil Vyas MD [facility]        Today's Diagnoses     CKD (chronic kidney disease) stage 4, GFR 15-29 ml/min (H)    -  1    Type 2 diabetes mellitus with stage 4 chronic kidney disease, with long-term current use of insulin (H)           Follow-ups after your visit        Follow-up notes from your care team     Return in about 4 months (around 12/9/2017).      Your next 10 appointments already scheduled     Aug 09, 2017  2:00 PM CDT   (Arrive by 1:30 PM)   Return Visit with MD LAUR[facility] ([facility])    [address] [address]   [phone]            Sep 11, 2017 10:00 AM CDT   Ech Dobutamine Stress Test with [id number]   [facility], [place],  [facility] ([facility], [facility])    [address] [facility] [address]   [phone]           1.  Please bring or wear a comfortable two-piece outfit and walking shoes. 2.  Stop eating 3 hours before the test. You may drink water or juice. 3.  Stop all caffeine 12 hours before the test. This includes coffee, tea, soda pop, chocolate and certain medicines (such as Anacin and Excederin). Also avoid decaf coffee and tea, as these contain small amounts of caffeine. 4.  No alcohol, smoking or use of other tobacco products for 12 hours before the test. 5.  Refer to your provider instructions to see if you need to stop any medications (such as beta-blockers or nitrates) for this test. 6.  For patients with diabetes: -   If you take insulin, call your diabetes care team. Ask if you should take a   dose the morning of your test. -   If you take diabetes medicine by mouth, don't take it on the morning of your test.  Bring it with you to take after the test.  (If you have questions, call your diabetes care team) 7.  When you arrive, please tell us if: -   You have diabetes. -   You have taken Viagra, Cialis or Levitra in the past 48 hours. 8.  For any questions that cannot be answered, please contact the ordering physician            Dec 13, 2017  1:30 PM CST   Lab with  LAB   [facility] ([facility])    [address]  1st Floor  [facility] [address] [phone]            Dec 13, 2017  2:30 PM CST   (Arrive by 2:00 PM)   Return Visit with Basil Vyas MD   [facility] ([facility])    [address]  3rd Floor  [place] [address] [phone]              Who to contact     If you have questions or need follow up information about today's clinic visit or your schedule please contact [facility] directly at [phone].  Normal or non-critical lab and imaging results will be communicated to you by [employer], letter or phone within 4 business days after the clinic has received the results. If you do not hear from us within 7 days, please contact the clinic through [employer] or phone. If you have a critical or abnormal lab result, we will notify you by phone as soon as possible.  Submit refill requests through [employer] or call your pharmacy and they will forward the refill request to us. Please allow 3 business days for your refill to be completed.          Additional Information About Your Visit        [employer] Information     [employer] gives you secure access to your electronic health record. If you see a primary care provider, you can also send messages to your care team and make appointments. If you have questions, please call your primary care clinic.  If you do not have a primary care provider, please call [phone] and they will assist you.        Care EveryWhere ID     This is your Care EveryWhere ID. This could be used by  "other organizations to access your [facility] medical records  [id number]        Your Vitals Were     Pulse Temperature Height Pulse Oximetry BMI (Body Mass Index)       76 98.1  F (36.7  C) (Oral) 1.803 m (5' 11\") 98% 29.93 kg/m2        Blood Pressure from Last 3 Encounters:   08/09/17 130/73   04/19/17 129/71   01/25/17 126/73    Weight from Last 3 Encounters:   08/09/17 97.3 kg (214 lb 9.6 oz)   01/25/17 104.3 kg (230 lb)   09/21/16 103.8 kg (228 lb 12.8 oz)              Today, you had the following     No orders found for display         Today's Medication Changes          These changes are accurate as of: 8/9/17  1:38 PM.  If you have any questions, ask your nurse or doctor.               These medicines have changed or have updated prescriptions.        Dose/Directions    insulin detemir 100 UNIT/ML injection   Commonly known as:  LEVEMIR FLEXPEN/FLEXTOUCH   This may have changed:    - how much to take  - additional instructions   Used for:  Diabetes mellitus, type 2 (H)        Dose:  10 Units   Inject 10 Units Subcutaneous 2 times daily Dose was originally 50 units BID but pt has been cutting back   Refills:  0                Primary Care Provider Office Phone # Fax #    Celso Esparza [phone]       [facility] [address] [id number]        Equal Access to Services     [facility] AH: Hadii lili grijalvao Socarl, waaxda luqadaha, qaybta kaalmada adeegyada, awilda leigh. So [facility] [phone].    ATENCIÓN: Si habla español, tiene a schaefer disposición servicios gratuitos de asistencia lingüística. Llame al [phone].    We comply with applicable federal civil rights laws and [place] laws. We do not discriminate on the basis of race, color, national origin, age, disability sex, sexual orientation or gender identity.            Thank you!     Thank you for choosing [facility]  for your care. Our goal is always to " provide you with excellent care. Hearing back from our patients is one way we can continue to improve our services. Please take a few minutes to complete the written survey that you may receive in the mail after your visit with us. Thank you!             Your Updated Medication List - Protect others around you: Learn how to safely use, store and throw away your medicines at www.disposemymeds.org.          This list is accurate as of: 8/9/17  1:38 PM.  Always use your most recent med list.                   Brand Name Dispense Instructions for use Diagnosis    amphetamine-dextroamphetamine 10 MG per tablet    ADDERALL     Take 10 mg by mouth 3 times daily (before meals)        ARANESP (ALBUMIN FREE) 300 MCG/ML Soln   Generic drug:  darbepoetin aaliyah-polysorbate      Inject 300 mcg Subcutaneous every 14 days As needed for hgb <=11g/dL        ASPIRIN EC PO      Take 81 mg by mouth daily        atorvastatin 20 MG tablet    LIPITOR     Take 20 mg by mouth every evening 1700        B-D U/F 31G X 8 MM   Generic drug:  insulin pen needle      USE AS DIRECTED        blood glucose monitoring lancets      3x/day with blood glucose testing        ferrous sulfate 325 (65 FE) MG tablet    IRON     Take 325 mg by mouth 2 times daily        FLONASE 50 MCG/ACT spray   Generic drug:  fluticasone      Spray 1 spray into both nostrils 2 times daily as needed        furosemide 20 MG tablet    LASIX    540 tablet    Take 3 tablets (60 mg) by mouth 2 times daily    CKD (chronic kidney disease) stage 4, GFR 15-29 ml/min (H), Secondary hypertension, unspecified       gabapentin 300 MG capsule    NEURONTIN     Take 300 mg by mouth daily He takes 300mg about every 3 days        11/11/15  Pt states he takes 300mg 2x/day        GLUCOSAMINE SULFATE PO      Take by mouth daily        insulin aspart 100 UNIT/ML injection    NovoLOG PEN     Inject subcutaneous 3 times daily with meals and bedtime?Medium Scale?less than 70 .................. Refer to  Hypoglycemia?Treatment Protocol?70 - 149 ......................... No corrective insulin?150 - 199 ....................... 2 units?200 - 249 ....................... 4 units?250 - 299 ....................... 6 units?300 - 349 ....................... 8 units?350 or greater ................ 10 units and notify MD        insulin detemir 100 UNIT/ML injection    LEVEMIR FLEXPEN/FLEXTOUCH     Inject 10 Units Subcutaneous 2 times daily Dose was originally 50 units BID but pt has been cutting back    Diabetes mellitus, type 2 (H)       loratadine 10 MG tablet    CLARITIN     Take 10 mg by mouth daily        losartan 100 MG tablet    COZAAR    90 tablet    Take 1 tablet (100 mg) by mouth daily    HTN (hypertension)       meclizine 25 MG tablet    ANTIVERT     Take 25 mg by mouth 2 times daily        metoprolol 25 MG tablet    LOPRESSOR    180 tablet    Take 1 tablet (25 mg) by mouth 2 times daily    HTN (hypertension)       * minoxidil 2.5 MG tablet    LONITEN    180 tablet    TAKE 2 TABLETS BY MOUTH DAILY.    HTN (hypertension), CKD (chronic kidney disease) stage 4, GFR 15-29 ml/min (H)       * minoxidil 2.5 MG tablet    LONITEN    60 tablet    TAKE 2 TABLETS BY MOUTH DAILY.    Hypertension, CKD (chronic kidney disease) stage 4, GFR 15-29 ml/min (H)       MULTI-VITAMINS Tabs      Take 1 tablet by mouth daily        NUVIGIL PO      Take 200 mg by mouth every morning        omeprazole 20 MG CR capsule    priLOSEC     Take 20 mg by mouth daily        OSTEO BI-FLEX ADV TRIPLE ST PO      Take by mouth daily        sodium bicarbonate 650 MG tablet     120 tablet    Take 1 tablet (650 mg) by mouth 2 times daily    CKD (chronic kidney disease)       traZODone 50 MG tablet    DESYREL     Take 50 mg by mouth At Bedtime        UNABLE TO FIND      Take by mouth daily MEDICATION NAME: Brain Peak        zolpidem 5 MG tablet    AMBIEN     Take 5 mg by mouth        * Notice:  This list has 2 medication(s) that are the same as other  medications prescribed for you. Read the directions carefully, and ask your doctor or other care provider to review them with you.

## 2017-08-14 DIAGNOSIS — I10 HTN (HYPERTENSION): ICD-10-CM

## 2017-08-14 LAB — PRA SINGLE ANTIGEN IGG ANTIBODY: NORMAL

## 2017-08-14 RX ORDER — METOPROLOL TARTRATE 25 MG/1
TABLET, FILM COATED ORAL
Qty: 180 TABLET | Refills: 1 | Status: SHIPPED | OUTPATIENT
Start: 2017-08-14 | End: 2018-01-22

## 2017-09-11 ENCOUNTER — TRANSFERRED RECORDS (OUTPATIENT)
Dept: HEALTH INFORMATION MANAGEMENT | Facility: CLINIC | Age: 72
End: 2017-09-11

## 2017-09-28 ENCOUNTER — CARE COORDINATION (OUTPATIENT)
Dept: NEPHROLOGY | Facility: CLINIC | Age: 72
End: 2017-09-28

## 2017-09-28 NOTE — PROGRESS NOTES
Attempted to contact patient to follow up on symptoms. Call rang out, voicemail full. Will try again at a later time.    Heather Kent RN

## 2017-09-29 NOTE — PROGRESS NOTES
Made second attempt to contact patient. Call rang out, voicemail full. Will try again.    Heather Kent RN

## 2017-10-09 DIAGNOSIS — Z76.82 ORGAN TRANSPLANT CANDIDATE: Primary | ICD-10-CM

## 2017-10-09 DIAGNOSIS — N18.6 END STAGE RENAL DISEASE (H): ICD-10-CM

## 2017-10-09 DIAGNOSIS — C61 PROSTATE CANCER (H): ICD-10-CM

## 2017-10-09 DIAGNOSIS — Z12.5 PROSTATE CANCER SCREENING: ICD-10-CM

## 2017-10-09 DIAGNOSIS — Z01.810 PRE-OPERATIVE CARDIOVASCULAR EXAMINATION: ICD-10-CM

## 2017-10-10 DIAGNOSIS — D47.2 MGUS (MONOCLONAL GAMMOPATHY OF UNKNOWN SIGNIFICANCE): Primary | ICD-10-CM

## 2017-10-16 ENCOUNTER — CARE COORDINATION (OUTPATIENT)
Dept: NEPHROLOGY | Facility: CLINIC | Age: 72
End: 2017-10-16

## 2017-10-16 DIAGNOSIS — Z76.82 ORGAN TRANSPLANT CANDIDATE: Primary | ICD-10-CM

## 2017-10-16 DIAGNOSIS — N18.6 END STAGE RENAL DISEASE (H): ICD-10-CM

## 2017-10-16 NOTE — PROGRESS NOTES
Attempted to reach patient to follow up on BP / symptoms. Call rang out, voicemail full. Will try again at a later time.    Heather Kent RN

## 2017-10-17 NOTE — PROGRESS NOTES
Made second attempt to reach patient. Went straight to voicemail, which is full.    Heather Kent RN

## 2017-11-01 ENCOUNTER — CARE COORDINATION (OUTPATIENT)
Dept: NEPHROLOGY | Facility: CLINIC | Age: 72
End: 2017-11-01

## 2017-11-01 NOTE — PROGRESS NOTES
Attempted to contact patient. Went straight to voicemail, which is full. Unable to leave a message. Will try again at a later time.    Heather Kent RN

## 2017-11-02 NOTE — PROGRESS NOTES
Made second attempt to contact patient. Line picked up, but no one was there. Will try again at a later time.    Heather Kent RN

## 2017-11-03 NOTE — PROGRESS NOTES
Reason for Call    Spoke with patient. Said BP has been elevated for 2 weeks. Averaging 180/80's, but heart rate is consistent in the 70's. He's been working to lose weight. Denied chest pain, shortness of breath, edema, headaches, or dizziness. Did take penicillin for 1 week for a broken tooth (pain has resolved). No other changes with medications.    Currently prescribed minoxidil 5mg daily, lasix 60mg BID, losartan 100mg daily, and metoprolol 25mg BID.    Collaboration    Above discussed with Dr. Vyas.     Let's have him come in for a nurse visit for a BP check and to evaluate volume status.     Plan    1. Schedule nurse visit  2. Follow up with Dr. Vyas as scheduled on 12/13    Patient was given an opportunity to ask questions and have those questions answered to his satisfaction.  Patient verbalized understanding of instructions provided and agreed to plan of care.    Heather Kent RN

## 2017-11-17 ENCOUNTER — TELEPHONE (OUTPATIENT)
Dept: TRANSPLANT | Facility: CLINIC | Age: 72
End: 2017-11-17

## 2017-11-17 ENCOUNTER — CARE COORDINATION (OUTPATIENT)
Dept: NEPHROLOGY | Facility: CLINIC | Age: 72
End: 2017-11-17

## 2017-11-17 NOTE — LETTER
November 20, 2017    Austin Walker  [address] [address]      Dear Austin Walker,    [facility] scheduling office has been trying to reach you to schedule your waitlist appointments.    Our transplant team has not been able to contact you at: [phone] or   [phone]    Please call  [phone] so we can arrange this important visit.  We look forward to hearing from you.      Thank you,    [facility]  [facility]

## 2017-11-20 NOTE — PROGRESS NOTES
Made second attempt to contact patient. Went straight to voicemail, unable to leave a message. Sent [employer] message.    Heather Kent RN

## 2017-12-05 ENCOUNTER — CARE COORDINATION (OUTPATIENT)
Dept: NEPHROLOGY | Facility: CLINIC | Age: 72
End: 2017-12-05

## 2017-12-05 NOTE — PROGRESS NOTES
Attempted to reach patient to discuss BP / nurse visit. No answer, voicemail is full. Will try again at a later time.    Heather Kent RN

## 2017-12-11 DIAGNOSIS — N18.4 CKD (CHRONIC KIDNEY DISEASE) STAGE 4, GFR 15-29 ML/MIN (H): Primary | ICD-10-CM

## 2017-12-20 ENCOUNTER — RESULTS ONLY (OUTPATIENT)
Dept: OTHER | Facility: CLINIC | Age: 72
End: 2017-12-20

## 2017-12-20 ENCOUNTER — OFFICE VISIT (OUTPATIENT)
Dept: NEPHROLOGY | Facility: CLINIC | Age: 72
End: 2017-12-20
Attending: INTERNAL MEDICINE
Payer: MEDICARE

## 2017-12-20 VITALS
HEART RATE: 101 BPM | BODY MASS INDEX: 29.73 KG/M2 | OXYGEN SATURATION: 97 % | TEMPERATURE: 99.1 F | HEIGHT: 71 IN | SYSTOLIC BLOOD PRESSURE: 157 MMHG | WEIGHT: 212.4 LBS | DIASTOLIC BLOOD PRESSURE: 78 MMHG

## 2017-12-20 DIAGNOSIS — N18.4 CKD (CHRONIC KIDNEY DISEASE) STAGE 4, GFR 15-29 ML/MIN (H): ICD-10-CM

## 2017-12-20 DIAGNOSIS — N18.6 END STAGE RENAL DISEASE (H): ICD-10-CM

## 2017-12-20 DIAGNOSIS — I15.0 RENOVASCULAR HYPERTENSION: Primary | ICD-10-CM

## 2017-12-20 DIAGNOSIS — Z01.810 PRE-OPERATIVE CARDIOVASCULAR EXAMINATION: ICD-10-CM

## 2017-12-20 DIAGNOSIS — D47.2 MGUS (MONOCLONAL GAMMOPATHY OF UNKNOWN SIGNIFICANCE): ICD-10-CM

## 2017-12-20 DIAGNOSIS — Z12.5 PROSTATE CANCER SCREENING: ICD-10-CM

## 2017-12-20 DIAGNOSIS — N18.4 TYPE 2 DIABETES MELLITUS WITH STAGE 4 CHRONIC KIDNEY DISEASE, WITH LONG-TERM CURRENT USE OF INSULIN (H): ICD-10-CM

## 2017-12-20 DIAGNOSIS — C61 PROSTATE CANCER (H): ICD-10-CM

## 2017-12-20 DIAGNOSIS — Z76.82 ORGAN TRANSPLANT CANDIDATE: ICD-10-CM

## 2017-12-20 DIAGNOSIS — E11.22 TYPE 2 DIABETES MELLITUS WITH STAGE 4 CHRONIC KIDNEY DISEASE, WITH LONG-TERM CURRENT USE OF INSULIN (H): ICD-10-CM

## 2017-12-20 DIAGNOSIS — Z79.4 TYPE 2 DIABETES MELLITUS WITH STAGE 4 CHRONIC KIDNEY DISEASE, WITH LONG-TERM CURRENT USE OF INSULIN (H): ICD-10-CM

## 2017-12-20 LAB
ALBUMIN SERPL-MCNC: 3.8 G/DL (ref 3.4–5)
ALP SERPL-CCNC: 218 U/L (ref 40–150)
ALT SERPL W P-5'-P-CCNC: 18 U/L (ref 0–70)
ANION GAP SERPL CALCULATED.3IONS-SCNC: 11 MMOL/L (ref 3–14)
AST SERPL W P-5'-P-CCNC: 24 U/L (ref 0–45)
BASOPHILS # BLD AUTO: 0 10E9/L (ref 0–0.2)
BASOPHILS NFR BLD AUTO: 0.2 %
BILIRUB SERPL-MCNC: 0.5 MG/DL (ref 0.2–1.3)
BUN SERPL-MCNC: 47 MG/DL (ref 7–30)
CALCIUM SERPL-MCNC: 8.6 MG/DL (ref 8.5–10.1)
CHLORIDE SERPL-SCNC: 106 MMOL/L (ref 94–109)
CO2 SERPL-SCNC: 19 MMOL/L (ref 20–32)
CREAT SERPL-MCNC: 3.29 MG/DL (ref 0.66–1.25)
DIFFERENTIAL METHOD BLD: ABNORMAL
EOSINOPHIL # BLD AUTO: 0 10E9/L (ref 0–0.7)
EOSINOPHIL NFR BLD AUTO: 0.7 %
ERYTHROCYTE [DISTWIDTH] IN BLOOD BY AUTOMATED COUNT: 12.9 % (ref 10–15)
GFR SERPL CREATININE-BSD FRML MDRD: 19 ML/MIN/1.7M2
GLUCOSE SERPL-MCNC: 296 MG/DL (ref 70–99)
HCT VFR BLD AUTO: 31.6 % (ref 40–53)
HGB BLD-MCNC: 10.7 G/DL (ref 13.3–17.7)
IMM GRANULOCYTES # BLD: 0.1 10E9/L (ref 0–0.4)
IMM GRANULOCYTES NFR BLD: 0.9 %
KAPPA LC UR-MCNC: 4.32 MG/DL (ref 0.33–1.94)
KAPPA LC/LAMBDA SER: 2.09 {RATIO} (ref 0.26–1.65)
LAMBDA LC SERPL-MCNC: 2.07 MG/DL (ref 0.57–2.63)
LYMPHOCYTES # BLD AUTO: 1.5 10E9/L (ref 0.8–5.3)
LYMPHOCYTES NFR BLD AUTO: 27.1 %
MCH RBC QN AUTO: 32.6 PG (ref 26.5–33)
MCHC RBC AUTO-ENTMCNC: 33.9 G/DL (ref 31.5–36.5)
MCV RBC AUTO: 96 FL (ref 78–100)
MONOCYTES # BLD AUTO: 0.7 10E9/L (ref 0–1.3)
MONOCYTES NFR BLD AUTO: 12.1 %
NEUTROPHILS # BLD AUTO: 3.2 10E9/L (ref 1.6–8.3)
NEUTROPHILS NFR BLD AUTO: 59 %
NRBC # BLD AUTO: 0 10*3/UL
NRBC BLD AUTO-RTO: 0 /100
PHOSPHATE SERPL-MCNC: 3.6 MG/DL (ref 2.5–4.5)
PLATELET # BLD AUTO: 181 10E9/L (ref 150–450)
POTASSIUM SERPL-SCNC: 4.6 MMOL/L (ref 3.4–5.3)
PROT SERPL-MCNC: 7.4 G/DL (ref 6.8–8.8)
PSA SERPL-ACNC: 0.25 UG/L (ref 0–4)
RBC # BLD AUTO: 3.28 10E12/L (ref 4.4–5.9)
SODIUM SERPL-SCNC: 136 MMOL/L (ref 133–144)
WBC # BLD AUTO: 5.4 10E9/L (ref 4–11)

## 2017-12-20 PROCEDURE — 84100 ASSAY OF PHOSPHORUS: CPT | Performed by: INTERNAL MEDICINE

## 2017-12-20 PROCEDURE — 36415 COLL VENOUS BLD VENIPUNCTURE: CPT | Performed by: INTERNAL MEDICINE

## 2017-12-20 PROCEDURE — 99213 OFFICE O/P EST LOW 20 MIN: CPT | Mod: ZF

## 2017-12-20 PROCEDURE — 00000402 ZZHCL STATISTIC TOTAL PROTEIN: Performed by: INTERNAL MEDICINE

## 2017-12-20 PROCEDURE — G0103 PSA SCREENING: HCPCS | Performed by: INTERNAL MEDICINE

## 2017-12-20 RX ORDER — FUROSEMIDE 20 MG
TABLET ORAL
Qty: 630 TABLET | Refills: 3 | Status: SHIPPED | OUTPATIENT
Start: 2017-12-20 | End: 2018-05-04

## 2017-12-20 ASSESSMENT — PAIN SCALES - GENERAL: PAINLEVEL: MILD PAIN (3)

## 2017-12-20 NOTE — MR AVS SNAPSHOT
After Visit Summary   12/20/2017    Austin Walker    MRN: [id number]           Patient Information     Date Of Birth          1945        Visit Information        Provider Department      12/20/2017 12:00 PM Basil Vyas MD [facility]        Today's Diagnoses     Renovascular hypertension    -  1    CKD (chronic kidney disease) stage 4, GFR 15-29 ml/min (H)        Type 2 diabetes mellitus with stage 4 chronic kidney disease, with long-term current use of insulin (H)           Follow-ups after your visit        Additional Services     Urology Adult Referral       Specify incontinence, difficulty urinating                  Follow-up notes from your care team     Return in about 3 months (around 3/20/2018).      Your next 10 appointments already scheduled     Feb 12, 2018 12:00 PM CST   (Arrive by 11:45 AM)   New Patient Visit with Karlene Odom PA-C   [facility] and [facility] ([facility])    [address] [place] [address]  4th Floor  [facility] [id number] [phone]            Apr 04, 2018 12:00 PM CDT   Lab with  LAB   [facility] ([facility])    [address] [place] [address]  1st Floor  [facility] [id number] [phone]            Apr 04, 2018  1:00 PM CDT   (Arrive by 12:30 PM)   Return Visit with Basil Vyas MD   [facility] ([facility])    [address] [place] [address]  3rd Floor  [facility] [id number] [phone]              Who to contact     If you have questions or need follow up information about today's clinic visit or your schedule please contact [facility] NEPHROLOGY directly at [phone].  Normal or non-critical lab and imaging results will be communicated to you by MyChart, letter or phone within 4 business days after the clinic has received the results. If you do not hear from us within 7 days, please contact the clinic through [employer]  "or phone. If you have a critical or abnormal lab result, we will notify you by phone as soon as possible.  Submit refill requests through [employer] or call your pharmacy and they will forward the refill request to us. Please allow 3 business days for your refill to be completed.          Additional Information About Your Visit        Justinmindhart Information     [employer] gives you secure access to your electronic health record. If you see a primary care provider, you can also send messages to your care team and make appointments. If you have questions, please call your primary care clinic.  If you do not have a primary care provider, please call [phone] and they will assist you.        Care EveryWhere ID     This is your Care EveryWhere ID. This could be used by other organizations to access your [facility] medical records  [id number]        Your Vitals Were     Pulse Temperature Height Pulse Oximetry BMI (Body Mass Index)       101 99.1  F (37.3  C) (Oral) 1.803 m (5' 11\") 97% 29.62 kg/m2        Blood Pressure from Last 3 Encounters:   12/20/17 157/78   08/09/17 130/73   04/19/17 129/71    Weight from Last 3 Encounters:   12/20/17 96.3 kg (212 lb 6.4 oz)   08/09/17 97.3 kg (214 lb 9.6 oz)   01/25/17 104.3 kg (230 lb)              We Performed the Following     Urology Adult Referral          Today's Medication Changes          These changes are accurate as of: 12/20/17  1:05 PM.  If you have any questions, ask your nurse or doctor.               These medicines have changed or have updated prescriptions.        Dose/Directions    furosemide 20 MG tablet   Commonly known as:  LASIX   This may have changed:    - how much to take  - how to take this  - when to take this  - additional instructions   Used for:  CKD (chronic kidney disease) stage 4, GFR 15-29 ml/min (H), Renovascular hypertension   Changed by:  Basil Vyas MD        Take 80mg in the morning and 60mg in the afternoon (~3p)   Quantity:  630 tablet "   Refills:  3       insulin detemir 100 UNIT/ML injection   Commonly known as:  LEVEMIR FLEXPEN/FLEXTOUCH   This may have changed:    - how much to take  - additional instructions   Used for:  Diabetes mellitus, type 2 (H)        Dose:  10 Units   Inject 10 Units Subcutaneous 2 times daily Dose was originally 50 units BID but pt has been cutting back   Refills:  0            Where to get your medicines      These medications were sent to [id number] - [facility] [address] AT [address]  [address], [facility] [address]     Phone:  [phone]     furosemide 20 MG tablet                Primary Care Provider Office Phone # Fax #    Celso Esparza [phone]       [facility] [address] [facility] [address]        Equal Access to Services     ENRRIQUE LACY : Hadii lili jasmine hadasho Soomaali, waaxda luqadaha, qaybta kaalmada adeegyada, awilda pang hayjuju bethea . So [facility] [phone].    ATENCIÓN: Si habla español, tiene a schaefer disposición servicios gratuitos de asistencia lingüística. Egdardo al [phone].    We comply with applicable federal civil rights laws and [place] laws. We do not discriminate on the basis of race, color, national origin, age, disability, sex, sexual orientation, or gender identity.            Thank you!     Thank you for choosing [facility] NEPHROLOG[facility]  for your care. Our goal is always to provide you with excellent care. Hearing back from our patients is one way we can continue to improve our services. Please take a few minutes to complete the written survey that you may receive in the mail after your visit with us. Thank you!             Your Updated Medication List - Protect others around you: Learn how to safely use, store and throw away your medicines at www.disposemymeds.org.          This list is accurate as of: 12/20/17  1:05 PM.  Always use your most recent  med list.                   Brand Name Dispense Instructions for use Diagnosis    amphetamine-dextroamphetamine 10 MG per tablet    ADDERALL     Take 10 mg by mouth 3 times daily (before meals)        ARANESP (ALBUMIN FREE) 300 MCG/ML Soln   Generic drug:  darbepoetin aaliyah-polysorbate      Inject 300 mcg Subcutaneous every 14 days As needed for hgb <=11g/dL        ASPIRIN EC PO      Take 81 mg by mouth daily        atorvastatin 20 MG tablet    LIPITOR     Take 20 mg by mouth every evening 1700        B-D U/F 31G X 8 MM   Generic drug:  insulin pen needle      USE AS DIRECTED        blood glucose monitoring lancets      3x/day with blood glucose testing        ferrous sulfate 325 (65 FE) MG tablet    IRON     Take 325 mg by mouth 2 times daily        FLONASE 50 MCG/ACT spray   Generic drug:  fluticasone      Spray 1 spray into both nostrils 2 times daily as needed        furosemide 20 MG tablet    LASIX    630 tablet    Take 80mg in the morning and 60mg in the afternoon (~3p)    CKD (chronic kidney disease) stage 4, GFR 15-29 ml/min (H), Renovascular hypertension       gabapentin 300 MG capsule    NEURONTIN     Take 300 mg by mouth daily He takes 300mg about every 3 days        11/11/15  Pt states he takes 300mg 2x/day        GLUCOSAMINE SULFATE PO      Take by mouth daily        insulin aspart 100 UNIT/ML injection    NovoLOG PEN     Inject subcutaneous 3 times daily with meals and bedtime?Medium Scale?less than 70 .................. Refer to Hypoglycemia?Treatment Protocol?70 - 149 ......................... No corrective insulin?150 - 199 ....................... 2 units?200 - 249 ....................... 4 units?250 - 299 ....................... 6 units?300 - 349 ....................... 8 units?350 or greater ................ 10 units and notify MD        insulin detemir 100 UNIT/ML injection    LEVEMIR FLEXPEN/FLEXTOUCH     Inject 10 Units Subcutaneous 2 times daily Dose was originally 50 units BID but pt has been  cutting back    Diabetes mellitus, type 2 (H)       loratadine 10 MG tablet    CLARITIN     Take 10 mg by mouth daily        losartan 100 MG tablet    COZAAR    90 tablet    Take 1 tablet (100 mg) by mouth daily    HTN (hypertension)       meclizine 25 MG tablet    ANTIVERT     Take 25 mg by mouth 2 times daily        metoprolol 25 MG tablet    LOPRESSOR    180 tablet    TAKE 1 TABLET(25 MG) BY MOUTH TWICE DAILY    HTN (hypertension)       * minoxidil 2.5 MG tablet    LONITEN    180 tablet    TAKE 2 TABLETS BY MOUTH DAILY.    HTN (hypertension), CKD (chronic kidney disease) stage 4, GFR 15-29 ml/min (H)       * minoxidil 2.5 MG tablet    LONITEN    60 tablet    TAKE 2 TABLETS BY MOUTH DAILY.    Hypertension, CKD (chronic kidney disease) stage 4, GFR 15-29 ml/min (H)       MULTI-VITAMINS Tabs      Take 1 tablet by mouth daily        NUVIGIL PO      Take 200 mg by mouth every morning        omeprazole 20 MG CR capsule    priLOSEC     Take 20 mg by mouth daily        OSTEO BI-FLEX ADV TRIPLE ST PO      Take by mouth daily        sodium bicarbonate 650 MG tablet     120 tablet    Take 1 tablet (650 mg) by mouth 2 times daily    CKD (chronic kidney disease)       traZODone 50 MG tablet    DESYREL     Take 50 mg by mouth At Bedtime        UNABLE TO FIND      Take by mouth daily MEDICATION NAME: Brain Peak        zolpidem 5 MG tablet    AMBIEN     Take 5 mg by mouth        * Notice:  This list has 2 medication(s) that are the same as other medications prescribed for you. Read the directions carefully, and ask your doctor or other care provider to review them with you.

## 2017-12-20 NOTE — NURSING NOTE
"Chief Complaint   Patient presents with     RECHECK     4 MO KIDNEY FOLLOW UP       Initial /78  Pulse 101  Temp 99.1  F (37.3  C) (Oral)  Ht 1.803 m (5' 11\")  Wt 96.3 kg (212 lb 6.4 oz)  SpO2 97%  BMI 29.62 kg/m2 Estimated body mass index is 29.62 kg/(m^2) as calculated from the following:    Height as of this encounter: 1.803 m (5' 11\").    Weight as of this encounter: 96.3 kg (212 lb 6.4 oz).  Medication Reconciliation: complete   THUAN HERNANDEZ CMA      "

## 2017-12-20 NOTE — PROGRESS NOTES
Nephrology follow up    71yo wM with CKD, DM, hypertension here for follow up. Has had a sinus infection the last two days. BPs elevated at home (SBP 150s). Otherwise well without N/V, anorexia, CP, SOB, F/C. 4pt ROS positive for incontinence and difficulty urinating.    PMHx  PRINCE - uses CPAP  Hypertension  DM - retinopathy, denies neuropathy  CKD - stage 4 with proteinuria     - biopsy: DM nephropathy with arteriosclerosis with no evidence of MGUS related dz  MGUS - follows with [facility]  CVA  Anemia - on darbe, Fe    All/adverse effects - dizziness with medications (amlodipine, terazosin, hydralazine, carvedilol).  Medications reviewed - of note  Losartan 100mg daily  Furosemide 60mg BID (only taking 20-40mg in afternoon)  Minoxidil 5mg daily  Metoprolol 25mg BID  Atorvastatin 20 daily  darbe  Fe sulf 325mg BID  NaHCO3 650 BID  Insulin  No NSAIDs    PCP - Dr Vivar, [place] [facility]    Exam   155/83   169/81   157/78  Gen - nad  HEENT - neck supple  CV - rrr, +CARON, no rub  Resp - cta bilat  Ext - 1-2+ edema  Psych - pleasant, appropriate  Neuro - no asterixis    Labs noted             2010   2012   2014     2015                        2016  2017             5/10   8/21   2/11   2/11  7/1  7/28-8/1 11/4 9/21 1/25 8/9 12/20  Cr         1.6    1.3    2.3    2.1   2.7   4.9-3.1    3.3    2.7  3.5  2.8  3.3 (GFR 19)  Ualb/Cr 891    339    Assessment/Recommendations: 71yo wM with CKD and hypertension  CKD - stage 4 with proteinuria due to DM and hypertension. Stable over last couple years. Biopsy did not reveal any MGUS related disease. No acute indication for RRT.   - avoid NSAIDs as possible    Hypertension - volume overloaded. BP not at target (new AHA/ACC target SBP <130mmHg)   - increased furosemide to 80mg AM and 60mg PM    Anemia - mgmt per anemia clinic.    Ca/Phos/PTH - Ca/phos acceptable; Prior PTH elevated, vitamin D low; given polypharmacy and issues with adherence, will hold off for  now.    Electrolytes - Na/K nl    Acidosis - bicarb low, continue bicarb     Incontinence - referred to urology    RTC in 3mo for BP check, renal panel, CBC

## 2017-12-20 NOTE — LETTER
12/20/2017    RE: Austin Walker  [address] [facility] [id number]     Nephrology follow up    71yo wM with CKD, DM, hypertension here for follow up. Has had a sinus infection the last two days. BPs elevated at home (SBP 150s). Otherwise well without N/V, anorexia, CP, SOB, F/C. 4pt ROS positive for incontinence and difficulty urinating.    PMHx  PRINCE - uses CPAP  Hypertension  DM - retinopathy, denies neuropathy  CKD - stage 4 with proteinuria     - biopsy: DM nephropathy with arteriosclerosis with no evidence of MGUS related dz  MGUS - follows with [facility]  CVA  Anemia - on darbe, Fe    All/adverse effects - dizziness with medications (amlodipine, terazosin, hydralazine, carvedilol).  Medications reviewed - of note  Losartan 100mg daily  Furosemide 60mg BID (only taking 20-40mg in afternoon)  Minoxidil 5mg daily  Metoprolol 25mg BID  Atorvastatin 20 daily  darbe  Fe sulf 325mg BID  NaHCO3 650 BID  Insulin  No NSAIDs    PCP - Dr Vivar [place] [facility]    Exam   155/83   169/81   157/78  Gen - nad  HEENT - neck supple  CV - rrr, +CARON, no rub  Resp - cta bilat  Ext - 1-2+ edema  Psych - pleasant, appropriate  Neuro - no asterixis    Labs noted             2010   2012   2014     2015                        2016  2017             5/10   8/21   2/11   2/11  7/1  7/28-8/1 11/4 9/21 1/25 8/9 12/20  Cr         1.6    1.3    2.3    2.1   2.7   4.9-3.1    3.3    2.7  3.5  2.8  3.3 (GFR 19)  Ualb/Cr 891    339    Assessment/Recommendations: 71yo wM with CKD and hypertension  CKD - stage 4 with proteinuria due to DM and hypertension. Stable over last couple years. Biopsy did not reveal any MGUS related disease. No acute indication for RRT.   - avoid NSAIDs as possible    Hypertension - volume overloaded. BP not at target (new AHA/ACC target SBP <130mmHg)   - increased furosemide to 80mg AM and 60mg PM    Anemia - mgmt per anemia clinic.    Ca/Phos/PTH - Ca/phos acceptable; Prior PTH elevated, vitamin  D low; given polypharmacy and issues with adherence, will hold off for now.    Electrolytes - Na/K nl    Acidosis - bicarb low, continue bicarb     Incontinence - referred to urology    RTC in 3mo for BP check, renal panel, CBC      Basil Vyas MD

## 2017-12-21 LAB
ALBUMIN SERPL ELPH-MCNC: 4.1 G/DL (ref 3.7–5.1)
ALPHA1 GLOB SERPL ELPH-MCNC: 0.4 G/DL (ref 0.2–0.4)
ALPHA2 GLOB SERPL ELPH-MCNC: 0.8 G/DL (ref 0.5–0.9)
B-GLOBULIN SERPL ELPH-MCNC: 0.6 G/DL (ref 0.6–1)
GAMMA GLOB SERPL ELPH-MCNC: 0.9 G/DL (ref 0.7–1.6)
M PROTEIN SERPL ELPH-MCNC: 0.6 G/DL
PROT PATTERN SERPL ELPH-IMP: ABNORMAL

## 2017-12-22 LAB — PRA SINGLE ANTIGEN IGG ANTIBODY: NORMAL

## 2017-12-28 ENCOUNTER — PRE VISIT (OUTPATIENT)
Dept: UROLOGY | Facility: CLINIC | Age: 72
End: 2017-12-28

## 2018-01-10 ENCOUNTER — CARE COORDINATION (OUTPATIENT)
Dept: NEPHROLOGY | Facility: CLINIC | Age: 73
End: 2018-01-10

## 2018-01-10 NOTE — PROGRESS NOTES
Attempted to contact patient to follow up, no answer and voicemail is full. Will try again at a later time.    Heather Kent RN

## 2018-01-22 ENCOUNTER — TELEPHONE (OUTPATIENT)
Dept: NEPHROLOGY | Facility: CLINIC | Age: 73
End: 2018-01-22

## 2018-01-22 DIAGNOSIS — I10 HTN (HYPERTENSION): ICD-10-CM

## 2018-01-22 RX ORDER — METOPROLOL TARTRATE 25 MG/1
TABLET, FILM COATED ORAL
Qty: 180 TABLET | Refills: 1 | Status: SHIPPED | OUTPATIENT
Start: 2018-01-22 | End: 2018-04-25

## 2018-01-24 DIAGNOSIS — I10 HTN (HYPERTENSION): ICD-10-CM

## 2018-01-25 ENCOUNTER — TELEPHONE (OUTPATIENT)
Dept: TRANSPLANT | Facility: CLINIC | Age: 73
End: 2018-01-25

## 2018-01-25 RX ORDER — LOSARTAN POTASSIUM 100 MG/1
TABLET ORAL
Qty: 90 TABLET | Refills: 3 | Status: SHIPPED | OUTPATIENT
Start: 2018-01-25 | End: 2018-09-25

## 2018-01-25 NOTE — TELEPHONE ENCOUNTER
"TEAM CONFERENCE:2018    ATTENDEES: Alecia Palma, Raji, Vinny, Coordinators, Social Work, Pharmacy, Finance, and Dietary    DISCUSSION: Presented communication difficulties with contacting patient to make his return wait list appointments. I reviewed the \"unable to contact\" letter sent to patient dated 2017. I reviewed the letter dated 2017 stating that we have been unsuccessful contacting you. You have 30 days to contact our transplant office to schedule the required appointment by 2018 or risk being discussed for delisting.   No response be the requested date. Messaged his primary nephrology team who also stated inability to make contact.     OUTCOME: Committee unanimous for de listing from  kidney wait list.       "

## 2018-01-25 NOTE — LETTER
January 25, 2018    Austin Walker  [address] [address]      Dear Mr. Walker,    The purpose of this letter is to let you know the [facility] Multi-Disciplinary Selection Team made the decision to remove you from the kidney transplant list.  This is due to the inability to contact you in spite of numerous attempts and letters sent without a response to the transplant center.   Important things you should know:    If you would like to discuss the decision, or if your medical status changes, you may call [phone] and ask to speak to your transplant coordinator.    We recommend that you continue to follow up with your primary care and referring physicians in order to manage your health concerns.    Should you desire to return to our transplant program, you may inquire after 1 year only with a recommendation from your nephrologist as well as compliance documented that you were responsive to communication by telephone and written correspondence.   Enclosed is a letter from [employer] which describes the services offered to patients by [facility] and the [facility].  Thank you for allowing us to participate in your care.  We wish you well.  Sincerely,  Kidney Transplant Team  Enclosure:  [employer] Letter     CC: Basil Vyas MD

## 2018-01-29 ENCOUNTER — PRE VISIT (OUTPATIENT)
Dept: UROLOGY | Facility: CLINIC | Age: 73
End: 2018-01-29

## 2018-01-30 ENCOUNTER — TELEPHONE (OUTPATIENT)
Dept: TRANSPLANT | Facility: CLINIC | Age: 73
End: 2018-01-30

## 2018-01-30 NOTE — TELEPHONE ENCOUNTER
Returning patients call as he called this morning asking why he was removed from the wait list. I called his home number and the voice mail was full and not accepting new call. I called his mobile and he answered, said he was Austin Walker, I introduced myself and the phone went silent. I could not ascertain if he hung up or the call was disconnected.   Please see the scanned in tracking information used to assure he received the de listing letter stating the reasons why.

## 2018-02-01 ENCOUNTER — CARE COORDINATION (OUTPATIENT)
Dept: NEPHROLOGY | Facility: CLINIC | Age: 73
End: 2018-02-01

## 2018-02-01 NOTE — PROGRESS NOTES
Reason for Call    Spoke with patient. States he's been doing very well since his appointment with Dr. Vyas in December. BP averaging 120/87 at home. Denied any symptoms or concerns.    Patient Education    1. Call this nurse if systolic (top number) blood pressure is consistently <110 or >160 for further instructions.     Plan    1. Continue to monitor BP  2. Follow up in 1 month    Patient was given an opportunity to ask questions and have those questions answered to his satisfaction.  Patient verbalized understanding of instructions provided and agreed to plan of care.    Heather Kent RN

## 2018-02-12 ENCOUNTER — OFFICE VISIT (OUTPATIENT)
Dept: UROLOGY | Facility: CLINIC | Age: 73
End: 2018-02-12
Payer: COMMERCIAL

## 2018-02-12 VITALS
BODY MASS INDEX: 30.8 KG/M2 | HEART RATE: 83 BPM | SYSTOLIC BLOOD PRESSURE: 131 MMHG | HEIGHT: 71 IN | WEIGHT: 220 LBS | DIASTOLIC BLOOD PRESSURE: 64 MMHG

## 2018-02-12 DIAGNOSIS — N39.41 URGE INCONTINENCE OF URINE: Primary | ICD-10-CM

## 2018-02-12 DIAGNOSIS — R35.0 URINARY FREQUENCY: ICD-10-CM

## 2018-02-12 ASSESSMENT — PAIN SCALES - GENERAL: PAINLEVEL: NO PAIN (0)

## 2018-02-12 NOTE — MR AVS SNAPSHOT
After Visit Summary   2/12/2018    Austin Walker    MRN: [id number]           Patient Information     Date Of Birth          1945        Visit Information        Provider Department      2/12/2018 12:00 PM Karlene Odom PA-C [facility] and [facility]        Today's Diagnoses     Urge incontinence of urine    -  1    Urinary frequency          Care Instructions    UROLOGY CLINIC VISIT PATIENT INSTRUCTIONS    1) Try to cut back on the amount of coffee that you are drinking. 1-2 cups per day is a good goal.     2) Complete 2 or 3 bladder diaries (see attached forms) and bring these with to your follow up appointment to review.       If you have any issues, questions or concerns in the meantime, do not hesitate to contact us at [phone] or via [employer].     It was a pleasure meeting with you today.  Thank you for allowing me and my team the privilege of caring for you today.  YOU are the reason we are here, and I truly hope we provided you with the excellent service you deserve.  Please let us know if there is anything else we can do for you so that we can be sure you are leaving completely satisfied with your care experience.                Follow-ups after your visit        Your next 10 appointments already scheduled     Feb 12, 2018 12:00 PM CST   (Arrive by 11:45 AM)   New Patient Visit with YOUSUF Bergeron [facility] and [facility] ([facility])    [address] [place] [address]  4th [facility] [id number] [phone]            Apr 04, 2018 12:00 PM CDT   Lab with [facility] ([facility])    [address] [place] [address]  [address]st [facility] [id number] [phone]            Apr 04, 2018  1:00 PM CDT   (Arrive by 12:30 PM)   Return Visit with Basil Vyas MD   [facility] ([facility])    [address] [place] [address]  "[address]  [address]  [facility] [address]   [phone]            Apr 09, 2018  1:30 PM CDT   (Arrive by 1:15 PM)   Return Visit with Karlene Odom PA-C   [facility] and [email or web address] Urologic Cancers ([facility])    [address]  4th Floor  [facility] [id number] [phone]              Who to contact     Please call your clinic at [phone] to:    Ask questions about your health    Make or cancel appointments    Discuss your medicines    Learn about your test results    Speak to your doctor            Additional Information About Your Visit        [employer] Information     [employer] gives you secure access to your electronic health record. If you see a primary care provider, you can also send messages to your care team and make appointments. If you have questions, please call your primary care clinic.  If you do not have a primary care provider, please call [phone] and they will assist you.      [employer] is an electronic gateway that provides easy, online access to your medical records. With [employer], you can request a clinic appointment, read your test results, renew a prescription or communicate with your care team.     To access your existing account, please contact your [facility] or call [phone] for assistance.        Care EveryWhere ID     This is your Care EveryWhere ID. This could be used by other organizations to access your [facility] medical records  [id number]        Your Vitals Were     Pulse Height BMI (Body Mass Index)             83 1.803 m (5' 11\") 30.68 kg/m2          Blood Pressure from Last 3 Encounters:   02/12/18 131/64   12/20/17 157/78   08/09/17 130/73    Weight from Last 3 Encounters:   02/12/18 99.8 kg (220 lb)   12/20/17 96.3 kg (212 lb 6.4 oz)   08/09/17 97.3 kg (214 lb 9.6 oz)              Today, you had the following     No orders found for display         Today's Medication Changes    "       These changes are accurate as of 2/12/18 11:52 AM.  If you have any questions, ask your nurse or doctor.               These medicines have changed or have updated prescriptions.        Dose/Directions    insulin detemir 100 UNIT/ML injection   Commonly known as:  LEVEMIR FLEXPEN/FLEXTOUCH   This may have changed:    - how much to take  - additional instructions   Used for:  Diabetes mellitus, type 2 (H)        Dose:  10 Units   Inject 10 Units Subcutaneous 2 times daily Dose was originally 50 units BID but pt has been cutting back   Refills:  0                Primary Care Provider Office Phone # Fax #    Celso Esparza [phone]       [facility] [address]  [place] [id number]        Equal Access to Services     ENRRIQUE LACY : Hadii lili jasmine hadphoenixo Socarl, waaxda luqadaha, qaybta kaalmada adeegyada, awilda bethea . [facility] [phone].    ATENCIÓN: Si habla español, tiene a schaefer disposición servicios gratuitos de asistencia lingüística. [facility] [phone].    We comply with applicable federal civil rights laws and [place] laws. We do not discriminate on the basis of race, color, national origin, age, disability, sex, sexual orientation, or gender identity.            Thank you!     Thank you for choosing [facility] AND [facility] FOR PROSTATE AND UROLOGIC CANCERS  for your care. Our goal is always to provide you with excellent care. Hearing back from our patients is one way we can continue to improve our services. Please take a few minutes to complete the written survey that you may receive in the mail after your visit with us. Thank you!             Your Updated Medication List - Protect others around you: Learn how to safely use, store and throw away your medicines at www.disposemymeds.org.          This list is accurate as of 2/12/18 11:52 AM.  Always use your most recent med list.                   Brand Name Dispense  Instructions for use Diagnosis    amphetamine-dextroamphetamine 10 MG per tablet    ADDERALL     Take 10 mg by mouth 3 times daily (before meals)        ARANESP (ALBUMIN FREE) 300 MCG/ML Soln   Generic drug:  darbepoetin aaliyah-polysorbate      Inject 300 mcg Subcutaneous every 14 days As needed for hgb <=11g/dL        ASPIRIN EC PO      Take 81 mg by mouth daily        atorvastatin 20 MG tablet    LIPITOR     Take 20 mg by mouth every evening 1700        B-D U/F 31G X 8 MM   Generic drug:  insulin pen needle      USE AS DIRECTED        blood glucose monitoring lancets      3x/day with blood glucose testing        ferrous sulfate 325 (65 FE) MG tablet    IRON     Take 325 mg by mouth 2 times daily        FLONASE 50 MCG/ACT spray   Generic drug:  fluticasone      Spray 1 spray into both nostrils 2 times daily as needed        furosemide 20 MG tablet    LASIX    630 tablet    Take 80mg in the morning and 60mg in the afternoon (~3p)    CKD (chronic kidney disease) stage 4, GFR 15-29 ml/min (H), Renovascular hypertension       gabapentin 300 MG capsule    NEURONTIN     Take 300 mg by mouth daily He takes 300mg about every 3 days        11/11/15  Pt states he takes 300mg 2x/day        GLUCOSAMINE SULFATE PO      Take by mouth daily        insulin aspart 100 UNIT/ML injection    NovoLOG PEN     Inject subcutaneous 3 times daily with meals and bedtime?Medium Scale?less than 70 .................. Refer to Hypoglycemia?Treatment Protocol?70 - 149 ......................... No corrective insulin?150 - 199 ....................... 2 units?200 - 249 ....................... 4 units?250 - 299 ....................... 6 units?300 - 349 ....................... 8 units?350 or greater ................ 10 units and notify MD        insulin detemir 100 UNIT/ML injection    LEVEMIR FLEXPEN/FLEXTOUCH     Inject 10 Units Subcutaneous 2 times daily Dose was originally 50 units BID but pt has been cutting back    Diabetes mellitus, type 2 (H)        loratadine 10 MG tablet    CLARITIN     Take 10 mg by mouth daily        losartan 100 MG tablet    COZAAR    90 tablet    TAKE 1 TABLET BY MOUTH DAILY    HTN (hypertension)       meclizine 25 MG tablet    ANTIVERT     Take 25 mg by mouth 2 times daily        metoprolol tartrate 25 MG tablet    LOPRESSOR    180 tablet    TAKE 1 TABLET(25 MG) BY MOUTH TWICE DAILY    HTN (hypertension)       * minoxidil 2.5 MG tablet    LONITEN    180 tablet    TAKE 2 TABLETS BY MOUTH DAILY.    HTN (hypertension), CKD (chronic kidney disease) stage 4, GFR 15-29 ml/min (H)       * minoxidil 2.5 MG tablet    LONITEN    60 tablet    TAKE 2 TABLETS BY MOUTH DAILY.    Hypertension, CKD (chronic kidney disease) stage 4, GFR 15-29 ml/min (H)       MULTI-VITAMINS Tabs      Take 1 tablet by mouth daily        NUVIGIL PO      Take 200 mg by mouth every morning        omeprazole 20 MG CR capsule    priLOSEC     Take 20 mg by mouth daily        OSTEO BI-FLEX ADV TRIPLE ST PO      Take by mouth daily        sodium bicarbonate 650 MG tablet     120 tablet    Take 1 tablet (650 mg) by mouth 2 times daily    CKD (chronic kidney disease)       traZODone 50 MG tablet    DESYREL     Take 50 mg by mouth At Bedtime        UNABLE TO FIND      Take by mouth daily MEDICATION NAME: Brain Peak        zolpidem 5 MG tablet    AMBIEN     Take 5 mg by mouth        * Notice:  This list has 2 medication(s) that are the same as other medications prescribed for you. Read the directions carefully, and ask your doctor or other care provider to review them with you.
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
## 2018-02-12 NOTE — PATIENT INSTRUCTIONS
UROLOGY CLINIC VISIT PATIENT INSTRUCTIONS    1) Try to cut back on the amount of coffee that you are drinking. 1-2 cups per day is a good goal.     2) Complete 2 or 3 bladder diaries (see attached forms) and bring these with to your follow up appointment to review.       If you have any issues, questions or concerns in the meantime, do not hesitate to contact us at [phone] or via [employer].     It was a pleasure meeting with you today.  Thank you for allowing me and my team the privilege of caring for you today.  YOU are the reason we are here, and I truly hope we provided you with the excellent service you deserve.  Please let us know if there is anything else we can do for you so that we can be sure you are leaving completely satisfied with your care experience.
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
## 2018-03-01 ENCOUNTER — CARE COORDINATION (OUTPATIENT)
Dept: NEPHROLOGY | Facility: CLINIC | Age: 73
End: 2018-03-01

## 2018-03-01 NOTE — PROGRESS NOTES
Attempted to contact patient to follow up on BP. Call rang out, voicemail full.    Heather Kent RN

## 2018-03-26 ENCOUNTER — CARE COORDINATION (OUTPATIENT)
Dept: NEPHROLOGY | Facility: CLINIC | Age: 73
End: 2018-03-26

## 2018-03-30 DIAGNOSIS — N18.4 CKD (CHRONIC KIDNEY DISEASE) STAGE 4, GFR 15-29 ML/MIN (H): Primary | ICD-10-CM

## 2018-04-04 ENCOUNTER — PRE VISIT (OUTPATIENT)
Dept: UROLOGY | Facility: CLINIC | Age: 73
End: 2018-04-04

## 2018-04-04 ENCOUNTER — TELEPHONE (OUTPATIENT)
Dept: NEPHROLOGY | Facility: CLINIC | Age: 73
End: 2018-04-04

## 2018-04-04 ENCOUNTER — OFFICE VISIT (OUTPATIENT)
Dept: NEPHROLOGY | Facility: CLINIC | Age: 73
End: 2018-04-04
Attending: INTERNAL MEDICINE
Payer: MEDICARE

## 2018-04-04 ENCOUNTER — HOSPITAL ENCOUNTER (EMERGENCY)
Facility: CLINIC | Age: 73
Discharge: HOME | End: 2018-04-04
Attending: EMERGENCY MEDICINE | Admitting: EMERGENCY MEDICINE
Payer: MEDICARE

## 2018-04-04 ENCOUNTER — RESULTS ONLY (OUTPATIENT)
Dept: OTHER | Facility: CLINIC | Age: 73
End: 2018-04-04

## 2018-04-04 VITALS
TEMPERATURE: 97.5 F | SYSTOLIC BLOOD PRESSURE: 119 MMHG | HEART RATE: 68 BPM | OXYGEN SATURATION: 99 % | RESPIRATION RATE: 13 BRPM | DIASTOLIC BLOOD PRESSURE: 87 MMHG

## 2018-04-04 DIAGNOSIS — N18.4 CKD (CHRONIC KIDNEY DISEASE) STAGE 4, GFR 15-29 ML/MIN (H): ICD-10-CM

## 2018-04-04 DIAGNOSIS — Z76.82 ORGAN TRANSPLANT CANDIDATE: ICD-10-CM

## 2018-04-04 DIAGNOSIS — E16.2 HYPOGLYCEMIA: ICD-10-CM

## 2018-04-04 DIAGNOSIS — N18.4 CKD (CHRONIC KIDNEY DISEASE) STAGE 4, GFR 15-29 ML/MIN (H): Primary | ICD-10-CM

## 2018-04-04 DIAGNOSIS — N18.6 END STAGE RENAL DISEASE (H): ICD-10-CM

## 2018-04-04 LAB
ALBUMIN SERPL-MCNC: 3.3 G/DL (ref 3.4–5)
ALBUMIN SERPL-MCNC: 3.8 G/DL (ref 3.4–5)
ALP SERPL-CCNC: 186 U/L (ref 40–150)
ALT SERPL W P-5'-P-CCNC: 16 U/L (ref 0–70)
ANION GAP SERPL CALCULATED.3IONS-SCNC: 8 MMOL/L (ref 3–14)
ANION GAP SERPL CALCULATED.3IONS-SCNC: 9 MMOL/L (ref 3–14)
AST SERPL W P-5'-P-CCNC: 24 U/L (ref 0–45)
BASOPHILS # BLD AUTO: 0 10E9/L (ref 0–0.2)
BASOPHILS NFR BLD AUTO: 0 %
BILIRUB SERPL-MCNC: 0.4 MG/DL (ref 0.2–1.3)
BUN SERPL-MCNC: 45 MG/DL (ref 7–30)
BUN SERPL-MCNC: 45 MG/DL (ref 7–30)
CALCIUM SERPL-MCNC: 8.3 MG/DL (ref 8.5–10.1)
CALCIUM SERPL-MCNC: 8.5 MG/DL (ref 8.5–10.1)
CHLORIDE SERPL-SCNC: 110 MMOL/L (ref 94–109)
CHLORIDE SERPL-SCNC: 112 MMOL/L (ref 94–109)
CO2 SERPL-SCNC: 21 MMOL/L (ref 20–32)
CO2 SERPL-SCNC: 23 MMOL/L (ref 20–32)
CREAT SERPL-MCNC: 2.77 MG/DL (ref 0.66–1.25)
CREAT SERPL-MCNC: 2.77 MG/DL (ref 0.66–1.25)
DIFFERENTIAL METHOD BLD: ABNORMAL
EOSINOPHIL # BLD AUTO: 0.1 10E9/L (ref 0–0.7)
EOSINOPHIL NFR BLD AUTO: 1.9 %
ERYTHROCYTE [DISTWIDTH] IN BLOOD BY AUTOMATED COUNT: 15.1 % (ref 10–15)
ERYTHROCYTE [DISTWIDTH] IN BLOOD BY AUTOMATED COUNT: 15.5 % (ref 10–15)
GFR SERPL CREATININE-BSD FRML MDRD: 23 ML/MIN/1.7M2
GFR SERPL CREATININE-BSD FRML MDRD: 23 ML/MIN/1.7M2
GLUCOSE BLDC GLUCOMTR-MCNC: 133 MG/DL (ref 70–99)
GLUCOSE BLDC GLUCOMTR-MCNC: 161 MG/DL (ref 70–99)
GLUCOSE BLDC GLUCOMTR-MCNC: 165 MG/DL (ref 70–99)
GLUCOSE BLDC GLUCOMTR-MCNC: 90 MG/DL (ref 70–99)
GLUCOSE SERPL-MCNC: 39 MG/DL (ref 70–99)
GLUCOSE SERPL-MCNC: 96 MG/DL (ref 70–99)
HCT VFR BLD AUTO: 34.2 % (ref 40–53)
HCT VFR BLD AUTO: 35.5 % (ref 40–53)
HGB BLD-MCNC: 11.4 G/DL (ref 13.3–17.7)
HGB BLD-MCNC: 11.9 G/DL (ref 13.3–17.7)
IMM GRANULOCYTES # BLD: 0 10E9/L (ref 0–0.4)
IMM GRANULOCYTES NFR BLD: 0 %
LYMPHOCYTES # BLD AUTO: 1.1 10E9/L (ref 0.8–5.3)
LYMPHOCYTES NFR BLD AUTO: 23.7 %
MCH RBC QN AUTO: 32.5 PG (ref 26.5–33)
MCH RBC QN AUTO: 33.1 PG (ref 26.5–33)
MCHC RBC AUTO-ENTMCNC: 33.3 G/DL (ref 31.5–36.5)
MCHC RBC AUTO-ENTMCNC: 33.5 G/DL (ref 31.5–36.5)
MCV RBC AUTO: 97 FL (ref 78–100)
MCV RBC AUTO: 99 FL (ref 78–100)
MONOCYTES # BLD AUTO: 0.5 10E9/L (ref 0–1.3)
MONOCYTES NFR BLD AUTO: 11.4 %
NEUTROPHILS # BLD AUTO: 3 10E9/L (ref 1.6–8.3)
NEUTROPHILS NFR BLD AUTO: 63 %
NRBC # BLD AUTO: 0 10*3/UL
NRBC BLD AUTO-RTO: 0 /100
PHOSPHATE SERPL-MCNC: 3.7 MG/DL (ref 2.5–4.5)
PLATELET # BLD AUTO: 196 10E9/L (ref 150–450)
PLATELET # BLD AUTO: 241 10E9/L (ref 150–450)
POTASSIUM SERPL-SCNC: 3.6 MMOL/L (ref 3.4–5.3)
POTASSIUM SERPL-SCNC: 3.7 MMOL/L (ref 3.4–5.3)
PROT SERPL-MCNC: 6.3 G/DL (ref 6.8–8.8)
RBC # BLD AUTO: 3.51 10E12/L (ref 4.4–5.9)
RBC # BLD AUTO: 3.6 10E12/L (ref 4.4–5.9)
SODIUM SERPL-SCNC: 140 MMOL/L (ref 133–144)
SODIUM SERPL-SCNC: 142 MMOL/L (ref 133–144)
WBC # BLD AUTO: 4.7 10E9/L (ref 4–11)
WBC # BLD AUTO: 7.9 10E9/L (ref 4–11)

## 2018-04-04 PROCEDURE — 25000128 H RX IP 250 OP 636: Mod: ZF

## 2018-04-04 PROCEDURE — 36415 COLL VENOUS BLD VENIPUNCTURE: CPT | Performed by: INTERNAL MEDICINE

## 2018-04-04 PROCEDURE — 99284 EMERGENCY DEPT VISIT MOD MDM: CPT | Mod: Z6 | Performed by: EMERGENCY MEDICINE

## 2018-04-04 PROCEDURE — 80053 COMPREHEN METABOLIC PANEL: CPT | Performed by: EMERGENCY MEDICINE

## 2018-04-04 PROCEDURE — 85025 COMPLETE CBC W/AUTO DIFF WBC: CPT | Performed by: EMERGENCY MEDICINE

## 2018-04-04 PROCEDURE — 99283 EMERGENCY DEPT VISIT LOW MDM: CPT | Performed by: EMERGENCY MEDICINE

## 2018-04-04 PROCEDURE — 85027 COMPLETE CBC AUTOMATED: CPT | Performed by: INTERNAL MEDICINE

## 2018-04-04 PROCEDURE — 40000141 ZZH STATISTIC PERIPHERAL IV START W/O US GUIDANCE: Mod: ZF

## 2018-04-04 PROCEDURE — 80069 RENAL FUNCTION PANEL: CPT | Performed by: INTERNAL MEDICINE

## 2018-04-04 PROCEDURE — 00000146 ZZHCL STATISTIC GLUCOSE BY METER IP

## 2018-04-04 ASSESSMENT — ENCOUNTER SYMPTOMS
HEADACHES: 0
DYSURIA: 0
NAUSEA: 0
ABDOMINAL PAIN: 0
RHINORRHEA: 0
SORE THROAT: 0
VOMITING: 0
SHORTNESS OF BREATH: 0
FEVER: 0
COUGH: 0
CHILLS: 0

## 2018-04-04 NOTE — ED AVS SNAPSHOT
[facility], Emergency Department    [address] [address]    Phone:  [phone]                                       Austin Walker   MRN: [id number]    Department:  [facility], Emergency Department   Date of Visit:  4/4/2018           Patient Information     Date Of Birth          1945        Your diagnoses for this visit were:     Hypoglycemia        You were seen by Dang Abreu MD.        Discharge Instructions       Know the Difference Between High and Low Glucose  High blood glucose (hyperglycemia)  Symptoms    Extreme thirst    Urinating more often    Headache    Hunger    Blurred vision    Feeling drowsy or tired    Slow healing after illness or injury    Frequent infections  Possible causes    Too much food    Wrong type or amount of diabetes medicine or insulin    Stress or illness    Some medicines  What to do    Test your blood glucose regularly.    If you take insulin: take correction insulin.    Check ketones, if your blood glucose is over 240.    Call the doctor if your glucose level is often above your goal. The doctor may need to change your insulin or diabetes medicine.    Call your care team if you are ill.  Low blood glucose (hypoglycemia)   Symptoms    Shaking, sweating, fast heartbeat    Feeling dizzy, tired or weak    Feeling anxious and irritable    Feeling nervous, crabby or confused    Hunger    Vision problems, headache    Numb or tingling mouth  Possible causes    Not enough carbohydrate    Too much insulin or diabetes medicine    More activity than normal    Stress, alcohol, some medicines  What to do    Test your blood glucose:    If under 70, have 1/2 cup juice or 3 to 4 glucose tablets.    If under 50, have 1 cup juice or 6 to 8 glucose tablets.    Repeat test every 15 minutes until blood glucose is between 70 and 100. Call 911 if it does not get better.    Call your care team if you have low blood glucose two or more times per week.  For  informational purposes only. Not to replace the advice of your health care provider.   Copyright   2006 [facility]. All rights reserved. [employer] 267185 - REV 08/16.    Please make an appointment to follow up with Your Primary Care Provider or endocrinologist in 1-2 days if any other concerns or questions about your glucose. Make sure you are checking your blood sugar regularly and make sure you eat if you take insulin.       Your next 10 appointments already scheduled     Apr 09, 2018  1:30 PM CDT   (Arrive by 1:15 PM)   Return Visit with Karlene Odom PA-C   [facility] and [facility] for Prostate and Urologic Cancers ([facility])    [address]  4th [address]  [facility] [address] [phone]              24 Hour Appointment Hotline       To make an appointment at any [facility], call [phone] ([phone]). If you don't have a family doctor or clinic, we will help you find one. [facility] are conveniently located to serve the needs of you and your family.             Review of your medicines      Our records show that you are taking the medicines listed below. If these are incorrect, please call your family doctor or clinic.        Dose / Directions Last dose taken    amphetamine-dextroamphetamine 10 MG per tablet   Commonly known as:  ADDERALL   Dose:  10 mg        Take 10 mg by mouth 3 times daily (before meals)   Refills:  0        ARANESP (ALBUMIN FREE) 300 MCG/ML Soln   Dose:  300 mcg   Generic drug:  darbepoetin aaliyah-polysorbate        Inject 300 mcg Subcutaneous every 14 days As needed for hgb <=11g/dL   Refills:  0        ASPIRIN EC PO   Dose:  81 mg        Take 81 mg by mouth daily   Refills:  0        atorvastatin 20 MG tablet   Commonly known as:  LIPITOR   Dose:  20 mg        Take 20 mg by mouth every evening 1700   Refills:  0        B-D U/F 31G X 8 MM   Generic drug:  insulin pen needle        USE AS DIRECTED   Refills:   0        blood glucose monitoring lancets        3x/day with blood glucose testing   Refills:  0        ferrous sulfate 325 (65 FE) MG tablet   Commonly known as:  IRON   Dose:  325 mg        Take 325 mg by mouth 2 times daily   Refills:  0        FLONASE 50 MCG/ACT spray   Dose:  1 spray   Generic drug:  fluticasone        Spray 1 spray into both nostrils 2 times daily as needed   Refills:  0        furosemide 20 MG tablet   Commonly known as:  LASIX   Quantity:  630 tablet        Take 80mg in the morning and 60mg in the afternoon (~3p)   Refills:  3        gabapentin 300 MG capsule   Commonly known as:  NEURONTIN   Dose:  300 mg        Take 300 mg by mouth daily He takes 300mg about every 3 days        11/11/15  Pt states he takes 300mg 2x/day   Refills:  0        GLUCOSAMINE SULFATE PO        Take by mouth daily   Refills:  0        insulin aspart 100 UNIT/ML injection   Commonly known as:  NovoLOG PEN        Inject subcutaneous 3 times daily with meals and bedtime?Medium Scale?less than 70 .................. Refer to Hypoglycemia?Treatment Protocol?70 - 149 ......................... No corrective insulin?150 - 199 ....................... 2 units?200 - 249 ....................... 4 units?250 - 299 ....................... 6 units?300 - 349 ....................... 8 units?350 or greater ................ 10 units and notify MD   Refills:  0        insulin detemir 100 UNIT/ML injection   Commonly known as:  LEVEMIR FLEXPEN/FLEXTOUCH   Dose:  10 Units        Inject 10 Units Subcutaneous 2 times daily Dose was originally 50 units BID but pt has been cutting back   Refills:  0        loratadine 10 MG tablet   Commonly known as:  CLARITIN   Dose:  10 mg        Take 10 mg by mouth daily   Refills:  0        losartan 100 MG tablet   Commonly known as:  COZAAR   Quantity:  90 tablet        TAKE 1 TABLET BY MOUTH DAILY   Refills:  3        meclizine 25 MG tablet   Commonly known as:  ANTIVERT   Dose:  25 mg        Take 25 mg  by mouth 2 times daily   Refills:  0        metoprolol tartrate 25 MG tablet   Commonly known as:  LOPRESSOR   Quantity:  180 tablet        TAKE 1 TABLET(25 MG) BY MOUTH TWICE DAILY   Refills:  1        * minoxidil 2.5 MG tablet   Commonly known as:  LONITEN   Quantity:  180 tablet        TAKE 2 TABLETS BY MOUTH DAILY.   Refills:  3        * minoxidil 2.5 MG tablet   Commonly known as:  LONITEN   Quantity:  60 tablet        TAKE 2 TABLETS BY MOUTH DAILY.   Refills:  11        MULTI-VITAMINS Tabs   Dose:  1 tablet        Take 1 tablet by mouth daily   Refills:  0        NUVIGIL PO   Dose:  200 mg        Take 200 mg by mouth every morning   Refills:  0        omeprazole 20 MG CR capsule   Commonly known as:  priLOSEC   Dose:  20 mg        Take 20 mg by mouth daily   Refills:  0        OSTEO BI-FLEX ADV TRIPLE ST PO        Take by mouth daily   Refills:  0        sodium bicarbonate 650 MG tablet   Dose:  650 mg   Quantity:  120 tablet        Take 1 tablet (650 mg) by mouth 2 times daily   Refills:  3        traZODone 50 MG tablet   Commonly known as:  DESYREL   Dose:  50 mg        Take 50 mg by mouth At Bedtime   Refills:  0        UNABLE TO FIND        Take by mouth daily MEDICATION NAME: Brain Peak   Refills:  0        zolpidem 5 MG tablet   Commonly known as:  AMBIEN   Dose:  5 mg        Take 5 mg by mouth   Refills:  0        * Notice:  This list has 2 medication(s) that are the same as other medications prescribed for you. Read the directions carefully, and ask your doctor or other care provider to review them with you.            Procedures and tests performed during your visit     Procedure/Test Number of Times Performed    CBC with platelets differential 1    Comprehensive metabolic panel 1    Glucose by meter 4      Orders Needing Specimen Collection     None      Pending Results     Date and Time Order Name Status Description    4/4/2018 1136 PRA SINGLE ANTIGEN IGG ANTIBODY In process             Pending  Culture Results     No orders found from 4/2/2018 to 4/5/2018.            Pending Results Instructions     If you had any lab results that were not finalized at the time of your Discharge, you can call the ED Lab Result RN at [phone]. You will be contacted by this team for any positive Lab results or changes in treatment. The nurses are available 7 days a week from 10A to 6:30P.  You can leave a message 24 hours per day and they will return your call.        Thank you for choosing [facility]       Thank you for choosing [facility] for your care. Our goal is always to provide you with excellent care. Hearing back from our patients is one way we can continue to improve our services. Please take a few minutes to complete the written survey that you may receive in the mail after you visit with us. Thank you!        [facility] Information     [facility] gives you secure access to your electronic health record. If you see a primary care provider, you can also send messages to your care team and make appointments. If you have questions, please call your primary care clinic.  If you do not have a primary care provider, please call [phone] and they will assist you.        Care EveryWhere ID     This is your Care EveryWhere ID. This could be used by other organizations to access your [facility] medical records  [id number]        Equal Access to Services     ENRRIQUE LACY : Gisela Sotelo, watabbyda tracy, qaybta kaalmada jojo, awilda leigh. So [facility] [phone].    ATENCIÓN: Si habla español, tiene a schaefer disposición servicios gratuitos de asistencia lingüística. Llame al [phone].    We comply with applicable federal civil rights laws and [place] laws. We do not discriminate on the basis of race, color, national origin, age, disability, sex, sexual orientation, or gender identity.            After Visit Summary       This is your record. Keep this with you and show to your  community pharmacist(s) and doctor(s) at your next visit.

## 2018-04-04 NOTE — TELEPHONE ENCOUNTER
DATE:  4/4/2018   TIME OF RECEIPT FROM LAB:  12:29pm   LAB TEST:  Glucose   LAB VALUE:  39  RESULTS GIVEN WITH READ-BACK TO (PROVIDER):  ROBEL LOWERY  TIME LAB VALUE REPORTED TO PROVIDER:   12:29pm    Spoke with provider who advised to give patient a snack and recheck glucose in clinic. Updated [facility] staff.    Heather Kent RN
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
## 2018-04-04 NOTE — LETTER
4/4/2018       RE: Austin Walker  [address] [place] [id number]     Dear Colleague,    Thank you for referring your patient, Austin Walker, to the [facility] NEPHROLOGY at [facility]. Please see a copy of my visit note below.    Patient was not seen by provider, due to rapid response being called. He was transferred to the ER.    Heather Kent RN    Again, thank you for allowing me to participate in the care of your patient.      Sincerely,    Basil Vyas MD

## 2018-04-04 NOTE — DISCHARGE INSTRUCTIONS
Know the Difference Between High and Low Glucose  High blood glucose (hyperglycemia)  Symptoms    Extreme thirst    Urinating more often    Headache    Hunger    Blurred vision    Feeling drowsy or tired    Slow healing after illness or injury    Frequent infections  Possible causes    Too much food    Wrong type or amount of diabetes medicine or insulin    Stress or illness    Some medicines  What to do    Test your blood glucose regularly.    If you take insulin: take correction insulin.    Check ketones, if your blood glucose is over 240.    Call the doctor if your glucose level is often above your goal. The doctor may need to change your insulin or diabetes medicine.    Call your care team if you are ill.  Low blood glucose (hypoglycemia)   Symptoms    Shaking, sweating, fast heartbeat    Feeling dizzy, tired or weak    Feeling anxious and irritable    Feeling nervous, crabby or confused    Hunger    Vision problems, headache    Numb or tingling mouth  Possible causes    Not enough carbohydrate    Too much insulin or diabetes medicine    More activity than normal    Stress, alcohol, some medicines  What to do    Test your blood glucose:    If under 70, have 1/2 cup juice or 3 to 4 glucose tablets.    If under 50, have 1 cup juice or 6 to 8 glucose tablets.    Repeat test every 15 minutes until blood glucose is between 70 and 100. Call 911 if it does not get better.    Call your care team if you have low blood glucose two or more times per week.  For informational purposes only. Not to replace the advice of your health care provider.   Copyright   2006 [employer]. All rights reserved. [employer] 626493 - REV 08/16.    Please make an appointment to follow up with Your Primary Care Provider or endocrinologist in 1-2 days if any other concerns or questions about your glucose. Make sure you are checking your blood sugar regularly and make sure you eat if you take insulin.

## 2018-04-04 NOTE — PROGRESS NOTES
Emergency Social Work Services Note    Date of  Intervention: 04/04/18  Last Emergency Department Visit:  today  Care Plan:  None.  Collaborated with:  per He RN; [employer].  [phone]    Data:  Pt presented to the ED from clinic.  STEVE He, states that pt is ready to return home however does not have a ride.  Appears to not have money to pay for a taxi.  Pt is independent and can navigate the one step into his home.     Intervention:  RUSSELL arranged a taxi ride through [employer] paid for by [employer].  A taxi will arrive at the ED entrance to  pt.    RUSSELL updated Ying who updated the pt.    Assessment:  Taxi ride home.    Plan:    Anticipated Disposition:  Home, no needs identified    Barriers to d/c plan:  None.    Follow Up:  RUSSELL available as needed.    CARMEN Mujica  Social Work Services  Emergency Department   [phone] phone  [phone] pager  On-call pager, [phone], 1600 to midnight

## 2018-04-04 NOTE — NURSING NOTE
Patient presented to clinic for follow up. He was found hunched over in his wheelchair. Breathing, but unresponsive. Brought him back to an exam room and called RRT. An IV was started and he received IM glucagon and D5. 911 was called, blood sugar still low. He was transferred to the ER via paramedics.    Called ER staff with update.    Heather Kent RN

## 2018-04-04 NOTE — ED AVS SNAPSHOT
[facility], [place], Emergency Department    [address] [place] [id number]    Phone:  [phone]                                       Austin Walker   MRN: [id number]    Department:  [facility], Emergency Department   Date of Visit:  4/4/2018           After Visit Summary Signature Page     I have received my discharge instructions, and my questions have been answered. I have discussed any challenges I see with this plan with the nurse or doctor.    ..........................................................................................................................................  Patient/Patient Representative Signature      ..........................................................................................................................................  Patient Representative Print Name and Relationship to Patient    ..................................................               ................................................  Date                                            Time    ..........................................................................................................................................  Reviewed by Signature/Title    ...................................................              ..............................................  Date                                                            Time

## 2018-04-04 NOTE — MR AVS SNAPSHOT
After Visit Summary   4/4/2018    Austin Walker    MRN: [id number]           Patient Information     Date Of Birth          1945        Visit Information        Provider Department      4/4/2018 1:00 PM Basil Vyas MD [facility] Nephrology        Today's Diagnoses     CKD (chronic kidney disease) stage 4, GFR 15-29 ml/min (H)    -  1       Follow-ups after your visit        Your next 10 appointments already scheduled     Apr 09, 2018  1:30 PM CDT   (Arrive by 1:15 PM)   Return Visit with Karlene Odom PA-C   [facility] Urology and [facility] for Prostate and Urologic Cancers ([facility])    [address]  [address] [facility] [address] [phone]              Who to contact     If you have questions or need follow up information about today's clinic visit or your schedule please contact [facility] NEPHROLOGY directly at [phone].  Normal or non-critical lab and imaging results will be communicated to you by [employer], letter or phone within 4 business days after the clinic has received the results. If you do not hear from us within 7 days, please contact the clinic through [employer] or phone. If you have a critical or abnormal lab result, we will notify you by phone as soon as possible.  Submit refill requests through [employer] or call your pharmacy and they will forward the refill request to us. Please allow 3 business days for your refill to be completed.          Additional Information About Your Visit        MyChart Information     [employer] gives you secure access to your electronic health record. If you see a primary care provider, you can also send messages to your care team and make appointments. If you have questions, please call your primary care clinic.  If you do not have a primary care provider, please call [phone] and they will assist you.        Care EveryWhere ID     This is your Care EveryWhere ID. This could be used by other  organizations to access your [place] medical records  [id number]         Blood Pressure from Last 3 Encounters:   04/04/18 119/87   02/12/18 131/64   12/20/17 157/78    Weight from Last 3 Encounters:   02/12/18 99.8 kg (220 lb)   12/20/17 96.3 kg (212 lb 6.4 oz)   08/09/17 97.3 kg (214 lb 9.6 oz)              Today, you had the following     No orders found for display         Today's Medication Changes          These changes are accurate as of 4/4/18 11:59 PM.  If you have any questions, ask your nurse or doctor.               These medicines have changed or have updated prescriptions.        Dose/Directions    insulin detemir 100 UNIT/ML injection   Commonly known as:  LEVEMIR FLEXPEN/FLEXTOUCH   This may have changed:    - how much to take  - additional instructions   Used for:  Diabetes mellitus, type 2 (H)        Dose:  10 Units   Inject 10 Units Subcutaneous 2 times daily Dose was originally 50 units BID but pt has been cutting back   Refills:  0                Primary Care Provider Office Phone # Fax #    Celso Esparza [phone]       [facility] [address] [facility] [address]  [place] [address]        Equal Access to Services     ENRRIQUE LACY : Gisela grijalvao Socarl, waaxda lugangaadaha, qaybta kaalmada adeegyada, awilda leigh. So [facility] [phone].    ATENCIÓN: Si habla español, tiene a schaefer disposición servicios gratuitos de asistencia lingüística. Llame al [phone].    We comply with applicable federal civil rights laws and [place] laws. We do not discriminate on the basis of race, color, national origin, age, disability, sex, sexual orientation, or gender identity.            Thank you!     Thank you for choosing [facility] NEPHROLOGY  for your care. Our goal is always to provide you with excellent care. Hearing back from our patients is one way we can continue to improve our services. Please take a few minutes to complete the  written survey that you may receive in the mail after your visit with us. Thank you!             Your Updated Medication List - Protect others around you: Learn how to safely use, store and throw away your medicines at www.disposemymeds.org.          This list is accurate as of 4/4/18 11:59 PM.  Always use your most recent med list.                   Brand Name Dispense Instructions for use Diagnosis    amphetamine-dextroamphetamine 10 MG per tablet    ADDERALL     Take 10 mg by mouth 3 times daily (before meals)        ARANESP (ALBUMIN FREE) 300 MCG/ML Soln   Generic drug:  darbepoetin aaliyah-polysorbate      Inject 300 mcg Subcutaneous every 14 days As needed for hgb <=11g/dL        ASPIRIN EC PO      Take 81 mg by mouth daily        atorvastatin 20 MG tablet    LIPITOR     Take 20 mg by mouth every evening 1700        B-D U/F 31G X 8 MM   Generic drug:  insulin pen needle      USE AS DIRECTED        blood glucose monitoring lancets      3x/day with blood glucose testing        ferrous sulfate 325 (65 FE) MG tablet    IRON     Take 325 mg by mouth 2 times daily        FLONASE 50 MCG/ACT spray   Generic drug:  fluticasone      Spray 1 spray into both nostrils 2 times daily as needed        furosemide 20 MG tablet    LASIX    630 tablet    Take 80mg in the morning and 60mg in the afternoon (~3p)    CKD (chronic kidney disease) stage 4, GFR 15-29 ml/min (H), Renovascular hypertension       gabapentin 300 MG capsule    NEURONTIN     Take 300 mg by mouth daily He takes 300mg about every 3 days        11/11/15  Pt states he takes 300mg 2x/day        GLUCOSAMINE SULFATE PO      Take by mouth daily        insulin aspart 100 UNIT/ML injection    NovoLOG PEN     Inject subcutaneous 3 times daily with meals and bedtime?Medium Scale?less than 70 .................. Refer to Hypoglycemia?Treatment Protocol?70 - 149 ......................... No corrective insulin?150 - 199 ....................... 2 units?200 - 249  ....................... 4 units?250 - 299 ....................... 6 units?300 - 349 ....................... 8 units?350 or greater ................ 10 units and notify MD        insulin detemir 100 UNIT/ML injection    LEVEMIR FLEXPEN/FLEXTOUCH     Inject 10 Units Subcutaneous 2 times daily Dose was originally 50 units BID but pt has been cutting back    Diabetes mellitus, type 2 (H)       loratadine 10 MG tablet    CLARITIN     Take 10 mg by mouth daily        losartan 100 MG tablet    COZAAR    90 tablet    TAKE 1 TABLET BY MOUTH DAILY    HTN (hypertension)       meclizine 25 MG tablet    ANTIVERT     Take 25 mg by mouth 2 times daily        metoprolol tartrate 25 MG tablet    LOPRESSOR    180 tablet    TAKE 1 TABLET(25 MG) BY MOUTH TWICE DAILY    HTN (hypertension)       * minoxidil 2.5 MG tablet    LONITEN    180 tablet    TAKE 2 TABLETS BY MOUTH DAILY.    HTN (hypertension), CKD (chronic kidney disease) stage 4, GFR 15-29 ml/min (H)       * minoxidil 2.5 MG tablet    LONITEN    60 tablet    TAKE 2 TABLETS BY MOUTH DAILY.    Hypertension, CKD (chronic kidney disease) stage 4, GFR 15-29 ml/min (H)       MULTI-VITAMINS Tabs      Take 1 tablet by mouth daily        NUVIGIL PO      Take 200 mg by mouth every morning        omeprazole 20 MG CR capsule    priLOSEC     Take 20 mg by mouth daily        OSTEO BI-FLEX ADV TRIPLE ST PO      Take by mouth daily        sodium bicarbonate 650 MG tablet     120 tablet    Take 1 tablet (650 mg) by mouth 2 times daily    CKD (chronic kidney disease)       traZODone 50 MG tablet    DESYREL     Take 50 mg by mouth At Bedtime        UNABLE TO FIND      Take by mouth daily MEDICATION NAME: Brain Peak        zolpidem 5 MG tablet    AMBIEN     Take 5 mg by mouth        * Notice:  This list has 2 medication(s) that are the same as other medications prescribed for you. Read the directions carefully, and ask your doctor or other care provider to review them with you.

## 2018-04-04 NOTE — TELEPHONE ENCOUNTER
Patient is coming in to see Karlene Odom PA-C in to go over bladder diaries, called patient to please bring bladder diaries to appointment.

## 2018-04-05 ENCOUNTER — DOCUMENTATION ONLY (OUTPATIENT)
Dept: NEPHROLOGY | Facility: CLINIC | Age: 73
End: 2018-04-05

## 2018-04-05 LAB — PRA SINGLE ANTIGEN IGG ANTIBODY: NORMAL

## 2018-04-05 NOTE — PROGRESS NOTES
Rapid Response Epic Documentation     Situation:  Pt was found unresponsive sitting in wheel chair in lobby and was brought back to a room.  1/2 hour prior to pt. Being found in lobby and when checked pt's blood sugar was 38 mg/dl  The lab called the medical renal clinic with the critical lab value which prompted staff to find the pt.  When staff found the pt. The brought him back to a room called a rapid response.    Objective: Pt was unresponsive to painful stimuli sitting in wheel chair slumped forward with snoring respirations.    Assessment: /61 SpO2 98% pulse was 62    Plan: Pt placed in a better position to stop snoring respirations, Pt was given 50 mls D5w, 1 mg of glucagon with no improvements in mental status, so 911 was called.    Location: 3.13    Disposition:      Transfer to Emergency Room Via: 911    Protocol Used:     Hypoglycemia  Medication Given:  Glucagon, D5 50 mls

## 2018-04-06 ENCOUNTER — CARE COORDINATION (OUTPATIENT)
Dept: NEPHROLOGY | Facility: CLINIC | Age: 73
End: 2018-04-06

## 2018-04-09 ENCOUNTER — OFFICE VISIT (OUTPATIENT)
Dept: UROLOGY | Facility: CLINIC | Age: 73
End: 2018-04-09
Payer: COMMERCIAL

## 2018-04-09 VITALS
HEART RATE: 82 BPM | WEIGHT: 226 LBS | BODY MASS INDEX: 31.64 KG/M2 | SYSTOLIC BLOOD PRESSURE: 137 MMHG | HEIGHT: 71 IN | DIASTOLIC BLOOD PRESSURE: 71 MMHG

## 2018-04-09 DIAGNOSIS — N39.41 URGE INCONTINENCE: Primary | ICD-10-CM

## 2018-04-09 DIAGNOSIS — R35.0 URINARY FREQUENCY: ICD-10-CM

## 2018-04-09 RX ORDER — SOLIFENACIN SUCCINATE 5 MG/1
5 TABLET, FILM COATED ORAL DAILY
Qty: 30 TABLET | Refills: 5 | Status: SHIPPED | OUTPATIENT
Start: 2018-04-09 | End: 2018-10-02

## 2018-04-09 ASSESSMENT — PAIN SCALES - GENERAL: PAINLEVEL: NO PAIN (0)

## 2018-04-09 NOTE — PROGRESS NOTES
"UROLOGY OFFICE VISIT - FOLLOW UP    REASON FOR VISIT: follow up urinary frequency, urgency, incontinence; review bladder diaries    HPI: Mr. Austin Walker is a 72 year old male with a PMH of CKD stage 4 with proteinuria 2/2 DM and HTN (awaiting transplant listing, not currently on dialysis), MGUS (follows with [facility]), PRINCE, and h/o CVA who returns to the urology clinic for follow up of urinary frequency, urgency, and incontinence. Seen in initial consultation on 2/12/18 - please see consult note from this date for full history. In brief, he has had bothersome urinary symptoms for the past 3 years, progressively worsening. Incontinence started after he began taking Lasix (80 mg in the AM, 60 mg in the PM, ~2pm). He has multiple episodes of incontinence per day - goes through 4-6 Depends briefs per day. Leakage mainly occurs during the day, rarely overnight. Gets up 0-1 times per night to void. Will leak when he goes from supine or sitting to standing. States that urine just \"pours out.\" Also reports significant urgency and urge incontinence. No h/o UTI's or kidney stones. Negative UA through primary care office in January 2018. No hematuria, dysuria, needing to strain to void. Subjectively, force of stream is \"very strong.\" Occasional sensation of incomplete emptying but PVR at last office visit was 11 mL. Normally sits to void. Drinks 1 pot of coffee per day, 2 glasses of diet ginger ale, 1 glass of OJ in the AM.    Returns today and reports that he has been working to limit coffee and other bladder irritants. Has gone from 1 pot of coffee per day to 1 cup per day. Despite this, he has not noticed improvement in his urinary symptoms. In fact, things may be getting worse with more frequent nighttime incontinence now than ever before.     Review of bladder diaries reveals:  Voids q30-90 minutes during the day, nocturia x 0-1.   Episodes of incontinence: multiple per day, goes through 4-6 Depends per " "day.  Incontinence associated with: sitting to standing, strong urge  Volume Intake: 1 cup of coffee, 2-4 glasses of water, 1 glass of OJ  Voided Volume: 1890 mL per day; average voided volume: 150 mL; largest voided volume: 280 mL    PEx  /71  Pulse 82  Ht 1.803 m (5' 11\")  Wt 102.5 kg (226 lb)  BMI 31.52 kg/m2  GEN: well-appearing male in NAD  RESP: no increased respiratory effort    PSA   Date Value Ref Range Status   12/20/2017 0.25 0 - 4 ug/L Final     Comment:     Assay Method:  Chemiluminescence using Siemens Vista analyzer       Creatinine   Date Value Ref Range Status   04/04/2018 2.77 (H) 0.66 - 1.25 mg/dL Final       A/P  72 year old male with urinary frequency, urgency, and urge incontinence. Symptoms started after he began taking lasix 3 years ago - takes 80 mg qAM and 60 mg qPM (around 2pm). Also drinks a pot of coffee + juice + 2 cans of diet ginger ale per day with minimal water. Recent UA was negative, making infection unlikely. Uroflow at last office visit showed decreased Qmax (16.6 ml/s) but good bladder emptying (PVR 11 mL). Voiding diaries reveal frequent voids (q30-90 minutes) during the day and voiding in small volumes (100-280 mL per void). He has been working on limiting bladder irritants without significant improvement in frequency, urgency, incontinence. Suspect that diuretics are largely contributing to symptoms, but may have a component of OAB as well. We discussed management options including continued observation, anticholinergic medication or Myrbetriq, or further evaluation with urodynamics. The patient would like to try a medication. Will need to use caution given impaired renal function. Last estimated CrCl  36 mL/minute.    -Start Vesicare 5 mg once daily (approved for CrCl >30 mL/min). Side effects discussed.  -Continue to limit bladder irritants.     Complete another voiding diary and follow up in 4-6 weeks for PVR and symptom recheck. If no improvement, consider " UDS.    15 minutes spent with the patient, >50% in counseling and coordination of care.    Karlene Odom PA-C  Urology

## 2018-04-09 NOTE — MR AVS SNAPSHOT
After Visit Summary   4/9/2018    Austin Walker    MRN: [id number]           Patient Information     Date Of Birth          1945        Visit Information        Provider Department      4/9/2018 1:30 PM Karlene Odom PA-C [facility] Urology and [facility] for Prostate and Urologic Cancers        Today's Diagnoses     Urge incontinence    -  1      Care Instructions    UROLOGY CLINIC VISIT PATIENT INSTRUCTIONS    1) Start taking Vesicare (solifenacin) 5 mg once daily in the morning. This is to help with your urinary frequency, urgency, and leakage.    2) Continue to work on limiting bladder irritants (coffee, soda, caffeine, etc.)    Follow up in 2-3 months with another bladder diary.     If you have any issues, questions or concerns in the meantime, do not hesitate to contact us at [phone] or via [employer].     It was a pleasure meeting with you today.  Thank you for allowing me and my team the privilege of caring for you today.  YOU are the reason we are here, and I truly hope we provided you with the excellent service you deserve.  Please let us know if there is anything else we can do for you so that we can be sure you are leaving completely satisfied with your care experience.        At your visit today, we discussed your risk for falls and preventive options.    Fall Prevention  Falls often occur due to slipping, tripping or losing your balance. Millions of people fall every year and injure themselves. Here are ways to reduce your risk of falling again.    Think about your fall, was there anything that caused your fall that can be fixed, removed, or replaced?    Make your home safe by keeping walkways clear of objects you may trip over.    Use non-slip pads under rugs. Do not use area rugs or small throw rugs.    Use non-slip mats in bathtubs and showers.    Install handrails and lights on staircases.    Do not walk in poorly lit areas.    Do not stand on chairs or wobbly ladders.    Use  caution when reaching overhead or looking upward. This position can cause a loss of balance.    Be sure your shoes fit properly, have non-slip bottoms and are in good condition.     Wear shoes both inside and out. Avoid going barefoot or wearing slippers.    Be cautious when going up and down stairs, curbs, and when walking on uneven sidewalks.    If your balance is poor, consider using a cane or walker.    If your fall was related to alcohol use, stop or limit alcohol intake.     If your fall was related to use of sleeping medicines, talk to your doctor about this. You may need to reduce your dosage at bedtime if you awaken during the night to go to the bathroom.      To reduce the need for nighttime bathroom trips:    Avoid drinking fluids for several hours before going to bed    Empty your bladder before going to bed    Men can keep a urinal at the bedside    Stay as active as you can. Balance, flexibility, strength, and endurance all come from exercise. They all play a role in preventing falls. Ask your healthcare provider which types of activity are right for you.    Get your vision checked on a regular basis.    If you have pets, know where they are before you stand up or walk so you don't trip over them.    Use night lights.  Date Last Reviewed: 11/5/2015 2000-2017 [employer]. [address] [place] [address]. All rights reserved. This information is not intended as a substitute for professional medical care. Always follow your healthcare professional's instructions.                Follow-ups after your visit        Your next 10 appointments already scheduled     Jul 09, 2018 10:00 AM CDT   (Arrive by 9:45 AM)   Return Visit with Karlene Odom PA-C   [facility] Urology and Inst for Prostate and Urologic Cancers ([email or web address] and [facility])    [address] [address] [phone]              Who to contact     Please call your clinic  "at [phone] to:    Ask questions about your health    Make or cancel appointments    Discuss your medicines    Learn about your test results    Speak to your doctor            Additional Information About Your Visit        [employer] Information     [employer] gives you secure access to your electronic health record. If you see a primary care provider, you can also send messages to your care team and make appointments. If you have questions, please call your primary care clinic.  If you do not have a primary care provider, please call [phone] and they will assist you.      [employer] is an electronic gateway that provides easy, online access to your medical records. With [employer], you can request a clinic appointment, read your test results, renew a prescription or communicate with your care team.     To access your existing account, please contact your [facility] or call [phone] for assistance.        Care EveryWhere ID     This is your Care EveryWhere ID. This could be used by other organizations to access your [facility] medical records  [id number]        Your Vitals Were     Pulse Height BMI (Body Mass Index)             82 1.803 m (5' 11\") 31.52 kg/m2          Blood Pressure from Last 3 Encounters:   04/09/18 137/71   04/04/18 119/87   02/12/18 131/64    Weight from Last 3 Encounters:   04/09/18 102.5 kg (226 lb)   02/12/18 99.8 kg (220 lb)   12/20/17 96.3 kg (212 lb 6.4 oz)              Today, you had the following     No orders found for display         Today's Medication Changes          These changes are accurate as of 4/9/18  1:59 PM.  If you have any questions, ask your nurse or doctor.               Start taking these medicines.        Dose/Directions    solifenacin 5 MG tablet   Commonly known as:  VESICARE   Used for:  Urge incontinence   Started by:  Karlene Odom PA-C        Dose:  5 mg   Take 1 tablet (5 mg) by mouth daily   Quantity:  30 tablet   Refills:  " 5         These medicines have changed or have updated prescriptions.        Dose/Directions    insulin detemir 100 UNIT/ML injection   Commonly known as:  LEVEMIR FLEXPEN/FLEXTOUCH   This may have changed:    - how much to take  - additional instructions   Used for:  Diabetes mellitus, type 2 (H)        Dose:  10 Units   Inject 10 Units Subcutaneous 2 times daily Dose was originally 50 units BID but pt has been cutting back   Refills:  0            Where to get your medicines      These medications were sent to [id number] [address] AT [address]  [address], [facility] [place] [facility] [address]     Phone:  [phone]     solifenacin 5 MG tablet                Primary Care Provider Office Phone # Fax #    Celso Esparza [phone]       [facility] [address]  [facility] [address]        Equal Access to Services     ENRRIQUE LACY : Hadii aad ku hadasho Soomaali, waaxda luqadaha, qaybta kaalmada adeegyada, waxay maggiein haygarethn eamon leigh. So [facility] [phone].    ATENCIÓN: Si habla español, tiene a schaefer disposición servicios gratuitos de asistencia lingüística. Edgardo al [phone].    We comply with applicable federal civil rights laws and [place] laws. We do not discriminate on the basis of race, color, national origin, age, disability, sex, sexual orientation, or gender identity.            Thank you!     Thank you for choosing [facility] UROLOGY AND [facility] FOR PROSTATE AND UROLOGIC CANCERS  for your care. Our goal is always to provide you with excellent care. Hearing back from our patients is one way we can continue to improve our services. Please take a few minutes to complete the written survey that you may receive in the mail after your visit with us. Thank you!             Your Updated Medication List - Protect others around you: Learn how to safely use, store and throw away your  medicines at www.disposemymeds.org.          This list is accurate as of 4/9/18  1:59 PM.  Always use your most recent med list.                   Brand Name Dispense Instructions for use Diagnosis    amphetamine-dextroamphetamine 10 MG per tablet    ADDERALL     Take 10 mg by mouth 3 times daily (before meals)        ARANESP (ALBUMIN FREE) 300 MCG/ML Soln   Generic drug:  darbepoetin aaliyah-polysorbate      Inject 300 mcg Subcutaneous every 14 days As needed for hgb <=11g/dL        ASPIRIN EC PO      Take 81 mg by mouth daily        atorvastatin 20 MG tablet    LIPITOR     Take 20 mg by mouth every evening 1700        B-D U/F 31G X 8 MM   Generic drug:  insulin pen needle      USE AS DIRECTED        blood glucose monitoring lancets      3x/day with blood glucose testing        ferrous sulfate 325 (65 FE) MG tablet    IRON     Take 325 mg by mouth 2 times daily        FLONASE 50 MCG/ACT spray   Generic drug:  fluticasone      Spray 1 spray into both nostrils 2 times daily as needed        furosemide 20 MG tablet    LASIX    630 tablet    Take 80mg in the morning and 60mg in the afternoon (~3p)    CKD (chronic kidney disease) stage 4, GFR 15-29 ml/min (H), Renovascular hypertension       gabapentin 300 MG capsule    NEURONTIN     Take 300 mg by mouth daily He takes 300mg about every 3 days        11/11/15  Pt states he takes 300mg 2x/day        GLUCOSAMINE SULFATE PO      Take by mouth daily        insulin aspart 100 UNIT/ML injection    NovoLOG PEN     Inject subcutaneous 3 times daily with meals and bedtime?Medium Scale?less than 70 .................. Refer to Hypoglycemia?Treatment Protocol?70 - 149 ......................... No corrective insulin?150 - 199 ....................... 2 units?200 - 249 ....................... 4 units?250 - 299 ....................... 6 units?300 - 349 ....................... 8 units?350 or greater ................ 10 units and notify MD        insulin detemir 100 UNIT/ML injection     LEVEMIR FLEXPEN/FLEXTOUCH     Inject 10 Units Subcutaneous 2 times daily Dose was originally 50 units BID but pt has been cutting back    Diabetes mellitus, type 2 (H)       loratadine 10 MG tablet    CLARITIN     Take 10 mg by mouth daily        losartan 100 MG tablet    COZAAR    90 tablet    TAKE 1 TABLET BY MOUTH DAILY    HTN (hypertension)       meclizine 25 MG tablet    ANTIVERT     Take 25 mg by mouth 2 times daily        metoprolol tartrate 25 MG tablet    LOPRESSOR    180 tablet    TAKE 1 TABLET(25 MG) BY MOUTH TWICE DAILY    HTN (hypertension)       * minoxidil 2.5 MG tablet    LONITEN    180 tablet    TAKE 2 TABLETS BY MOUTH DAILY.    HTN (hypertension), CKD (chronic kidney disease) stage 4, GFR 15-29 ml/min (H)       * minoxidil 2.5 MG tablet    LONITEN    60 tablet    TAKE 2 TABLETS BY MOUTH DAILY.    Hypertension, CKD (chronic kidney disease) stage 4, GFR 15-29 ml/min (H)       MULTI-VITAMINS Tabs      Take 1 tablet by mouth daily        NUVIGIL PO      Take 200 mg by mouth every morning        omeprazole 20 MG CR capsule    priLOSEC     Take 20 mg by mouth daily        OSTEO BI-FLEX ADV TRIPLE ST PO      Take by mouth daily        sodium bicarbonate 650 MG tablet     120 tablet    Take 1 tablet (650 mg) by mouth 2 times daily    CKD (chronic kidney disease)       solifenacin 5 MG tablet    VESICARE    30 tablet    Take 1 tablet (5 mg) by mouth daily    Urge incontinence       traZODone 50 MG tablet    DESYREL     Take 50 mg by mouth At Bedtime        UNABLE TO FIND      Take by mouth daily MEDICATION NAME: Brain Peak        zolpidem 5 MG tablet    AMBIEN     Take 5 mg by mouth        * Notice:  This list has 2 medication(s) that are the same as other medications prescribed for you. Read the directions carefully, and ask your doctor or other care provider to review them with you.

## 2018-04-09 NOTE — NURSING NOTE
Chief Complaint   Patient presents with     RECHECK     bladder diary f/u        Remedios Bellamy MA

## 2018-04-09 NOTE — PROGRESS NOTES
Patient was not seen by provider, due to rapid response being called. He was transferred to the ER.    Heather Kent RN

## 2018-04-09 NOTE — PATIENT INSTRUCTIONS
UROLOGY CLINIC VISIT PATIENT INSTRUCTIONS    1) Start taking Vesicare (solifenacin) 5 mg once daily in the morning. This is to help with your urinary frequency, urgency, and leakage.    2) Continue to work on limiting bladder irritants (coffee, soda, caffeine, etc.)    Follow up in 2-3 months with another bladder diary.     If you have any issues, questions or concerns in the meantime, do not hesitate to contact us at [phone] or via [employer].     It was a pleasure meeting with you today.  Thank you for allowing me and my team the privilege of caring for you today.  YOU are the reason we are here, and I truly hope we provided you with the excellent service you deserve.  Please let us know if there is anything else we can do for you so that we can be sure you are leaving completely satisfied with your care experience.        At your visit today, we discussed your risk for falls and preventive options.    Fall Prevention  Falls often occur due to slipping, tripping or losing your balance. Millions of people fall every year and injure themselves. Here are ways to reduce your risk of falling again.    Think about your fall, was there anything that caused your fall that can be fixed, removed, or replaced?    Make your home safe by keeping walkways clear of objects you may trip over.    Use non-slip pads under rugs. Do not use area rugs or small throw rugs.    Use non-slip mats in bathtubs and showers.    Install handrails and lights on staircases.    Do not walk in poorly lit areas.    Do not stand on chairs or wobbly ladders.    Use caution when reaching overhead or looking upward. This position can cause a loss of balance.    Be sure your shoes fit properly, have non-slip bottoms and are in good condition.     Wear shoes both inside and out. Avoid going barefoot or wearing slippers.    Be cautious when going up and down stairs, curbs, and when walking on uneven sidewalks.    If your balance is poor, consider using a  cane or walker.    If your fall was related to alcohol use, stop or limit alcohol intake.     If your fall was related to use of sleeping medicines, talk to your doctor about this. You may need to reduce your dosage at bedtime if you awaken during the night to go to the bathroom.      To reduce the need for nighttime bathroom trips:    Avoid drinking fluids for several hours before going to bed    Empty your bladder before going to bed    Men can keep a urinal at the bedside    Stay as active as you can. Balance, flexibility, strength, and endurance all come from exercise. They all play a role in preventing falls. Ask your healthcare provider which types of activity are right for you.    Get your vision checked on a regular basis.    If you have pets, know where they are before you stand up or walk so you don't trip over them.    Use night lights.  Date Last Reviewed: 11/5/2015 2000-2017 The [employer]. [address] [place] [address], [place] [address]. All rights reserved. This information is not intended as a substitute for professional medical care. Always follow your healthcare professional's instructions.

## 2018-04-09 NOTE — LETTER
"4/9/2018       RE: Austin Walker  [address] [facility] [address]     Dear Colleague,    Thank you for referring your patient, Austin Walker, to the [facility] UROLOGY AND [facility]ST FOR PROSTATE AND [facility]OLOGIC CAN[facility] Please see a copy of my visit note below.    UROLOGY OFFICE VISIT - FOLLOW UP    REASON FOR VISIT: follow up urinary frequency, urgency, incontinence; review bladder diaries    HPI: Mr. Austin Walker is a 72 year old male with a PMH of CKD stage 4 with proteinuria 2/2 DM and HTN (awaiting transplant listing, not currently on dialysis), MGUS (follows with [facility]), PRINCE, and h/o CVA who returns to the urology clinic for follow up of urinary frequency, urgency, and incontinence. Seen in initial consultation on 2/12/18 - please see consult note from this date for full history. In brief, he has had bothersome urinary symptoms for the past 3 years, progressively worsening. Incontinence started after he began taking Lasix (80 mg in the AM, 60 mg in the PM, ~2pm). He has multiple episodes of incontinence per day - goes through 4-6 Depends briefs per day. Leakage mainly occurs during the day, rarely overnight. Gets up 0-1 times per night to void. Will leak when he goes from supine or sitting to standing. States that urine just \"pours out.\" Also reports significant urgency and urge incontinence. No h/o UTI's or kidney stones. Negative UA through primary care office in January 2018. No hematuria, dysuria, needing to strain to void. Subjectively, force of stream is \"very strong.\" Occasional sensation of incomplete emptying but PVR at last office visit was 11 mL. Normally sits to void. Drinks 1 pot of coffee per day, 2 glasses of diet ginger ale, 1 glass of OJ in the AM.    Returns today and reports that he has been working to limit coffee and other bladder irritants. Has gone from 1 pot of coffee per day to 1 cup per day. Despite this, he has not " "noticed improvement in his urinary symptoms. In fact, things may be getting worse with more frequent nighttime incontinence now than ever before.     Review of bladder diaries reveals:  Voids q30-90 minutes during the day, nocturia x 0-1.   Episodes of incontinence: multiple per day, goes through 4-6 Depends per day.  Incontinence associated with: sitting to standing, strong urge  Volume Intake: 1 cup of coffee, 2-4 glasses of water, 1 glass of OJ  Voided Volume: 1890 mL per day; average voided volume: 150 mL; largest voided volume: 280 mL    PEx  /71  Pulse 82  Ht 1.803 m (5' 11\")  Wt 102.5 kg (226 lb)  BMI 31.52 kg/m2  GEN: well-appearing male in NAD  RESP: no increased respiratory effort    PSA   Date Value Ref Range Status   12/20/2017 0.25 0 - 4 ug/L Final     Comment:     Assay Method:  Chemiluminescence using Siemens Vista analyzer       Creatinine   Date Value Ref Range Status   04/04/2018 2.77 (H) 0.66 - 1.25 mg/dL Final       A/P  72 year old male with urinary frequency, urgency, and urge incontinence. Symptoms started after he began taking lasix 3 years ago - takes 80 mg qAM and 60 mg qPM (around 2pm). Also drinks a pot of coffee + juice + 2 cans of diet ginger ale per day with minimal water. Recent UA was negative, making infection unlikely. Uroflow at last office visit showed decreased Qmax (16.6 ml/s) but good bladder emptying (PVR 11 mL).  Voiding diaries reveal frequent voids (q30-90 minutes) during the day and voiding in small volumes (100-280 mL per void). He has been working on limiting bladder irritants without significant improvement in frequency, urgency, incontinence. Suspect that diuretics are largely contributing to symptoms, but may have a component of OAB as well. We discussed management options including continued observation, anticholinergic medication or Myrbetriq, or further evaluation with urodynamics. The patient would like to try a medication. Will need to use caution " given impaired renal function. Last estimated CrCl  36 mL/minute.    -Start Vesicare 5 mg once daily (approved for CrCl >30 mL/min). Side effects discussed.  -Continue to limit bladder irritants.     Complete another voiding diary and follow up in 4-6 weeks for PVR and symptom recheck. If no improvement, consider UDS.    15 minutes spent with the patient, >50% in counseling and coordination of care.      Again, thank you for allowing me to participate in the care of your patient.      Sincerely,    Karlene Odom PA-C

## 2018-04-09 NOTE — PROGRESS NOTES
Spoke with patient. States he had two episodes of lower blood sugars (100, 84) over the weekend. Noted he wasn't eating as much, which likely impacted those readings. Just feels tired this morning, denied any other symptoms. Will reach out to clinic coordinators to help re-schedule his appointment with Dr. Vyas. Denied further questions or concerns for nephrology at this time.    Heather Kent RN

## 2018-04-09 NOTE — NURSING NOTE
Will close encounter as patient was not seen by provider. Sent message to clinic coordinators to re-schedule.    Heather Kent RN

## 2018-04-25 ENCOUNTER — TELEPHONE (OUTPATIENT)
Dept: NEPHROLOGY | Facility: CLINIC | Age: 73
End: 2018-04-25

## 2018-04-25 DIAGNOSIS — I10 HTN (HYPERTENSION): ICD-10-CM

## 2018-04-25 RX ORDER — METOPROLOL TARTRATE 25 MG/1
TABLET, FILM COATED ORAL
Qty: 180 TABLET | Refills: 3 | Status: SHIPPED | OUTPATIENT
Start: 2018-04-25 | End: 2018-09-25

## 2018-05-04 ENCOUNTER — CARE COORDINATION (OUTPATIENT)
Dept: NEPHROLOGY | Facility: CLINIC | Age: 73
End: 2018-05-04

## 2018-05-04 NOTE — PROGRESS NOTES
Reason for Call    Spoke with patient. He stopped lasix two days ago due to frequent urination, feels much better without it. No edema. Has not checked his BP in awhile. Denied any other symptoms or concerns, said blood sugars are doing well.    Collaboration    Updated Dr. Vyas.    Patient Education    1. Call this nurse if systolic (top number) blood pressure is consistently <110 or >160 for further instructions.     Plan    1. Continue to monitor BP / symptoms, call if any changes  2. Follow up as scheduled with Dr. Vyas on 5/30    Patient was given an opportunity to ask questions and have those questions answered to his satisfaction.  Patient verbalized understanding of instructions provided and agreed to plan of care.    Heather Kent RN

## 2018-05-25 DIAGNOSIS — N18.4 CKD (CHRONIC KIDNEY DISEASE) STAGE 4, GFR 15-29 ML/MIN (H): Primary | ICD-10-CM

## 2018-05-30 ENCOUNTER — OFFICE VISIT (OUTPATIENT)
Dept: NEPHROLOGY | Facility: CLINIC | Age: 73
End: 2018-05-30
Attending: INTERNAL MEDICINE
Payer: MEDICARE

## 2018-05-30 VITALS
WEIGHT: 211.4 LBS | HEIGHT: 71 IN | OXYGEN SATURATION: 98 % | BODY MASS INDEX: 29.6 KG/M2 | SYSTOLIC BLOOD PRESSURE: 175 MMHG | HEART RATE: 56 BPM | DIASTOLIC BLOOD PRESSURE: 77 MMHG

## 2018-05-30 DIAGNOSIS — Z79.4 TYPE 2 DIABETES MELLITUS WITH STAGE 4 CHRONIC KIDNEY DISEASE, WITH LONG-TERM CURRENT USE OF INSULIN (H): ICD-10-CM

## 2018-05-30 DIAGNOSIS — N18.4 CKD (CHRONIC KIDNEY DISEASE) STAGE 4, GFR 15-29 ML/MIN (H): ICD-10-CM

## 2018-05-30 DIAGNOSIS — E11.22 TYPE 2 DIABETES MELLITUS WITH STAGE 4 CHRONIC KIDNEY DISEASE, WITH LONG-TERM CURRENT USE OF INSULIN (H): ICD-10-CM

## 2018-05-30 DIAGNOSIS — I10 ESSENTIAL HYPERTENSION, BENIGN: Primary | ICD-10-CM

## 2018-05-30 DIAGNOSIS — N18.4 TYPE 2 DIABETES MELLITUS WITH STAGE 4 CHRONIC KIDNEY DISEASE, WITH LONG-TERM CURRENT USE OF INSULIN (H): ICD-10-CM

## 2018-05-30 LAB
ALBUMIN SERPL-MCNC: 3.5 G/DL (ref 3.4–5)
ANION GAP SERPL CALCULATED.3IONS-SCNC: 11 MMOL/L (ref 3–14)
BUN SERPL-MCNC: 51 MG/DL (ref 7–30)
CALCIUM SERPL-MCNC: 8.6 MG/DL (ref 8.5–10.1)
CHLORIDE SERPL-SCNC: 108 MMOL/L (ref 94–109)
CO2 SERPL-SCNC: 20 MMOL/L (ref 20–32)
CREAT SERPL-MCNC: 2.97 MG/DL (ref 0.66–1.25)
ERYTHROCYTE [DISTWIDTH] IN BLOOD BY AUTOMATED COUNT: 13.2 % (ref 10–15)
GFR SERPL CREATININE-BSD FRML MDRD: 21 ML/MIN/1.7M2
GLUCOSE SERPL-MCNC: 258 MG/DL (ref 70–99)
HCT VFR BLD AUTO: 31.8 % (ref 40–53)
HGB BLD-MCNC: 10.8 G/DL (ref 13.3–17.7)
MCH RBC QN AUTO: 33.2 PG (ref 26.5–33)
MCHC RBC AUTO-ENTMCNC: 34 G/DL (ref 31.5–36.5)
MCV RBC AUTO: 98 FL (ref 78–100)
PHOSPHATE SERPL-MCNC: 3.2 MG/DL (ref 2.5–4.5)
PLATELET # BLD AUTO: 171 10E9/L (ref 150–450)
POTASSIUM SERPL-SCNC: 4.4 MMOL/L (ref 3.4–5.3)
RBC # BLD AUTO: 3.25 10E12/L (ref 4.4–5.9)
SODIUM SERPL-SCNC: 140 MMOL/L (ref 133–144)
WBC # BLD AUTO: 4.4 10E9/L (ref 4–11)

## 2018-05-30 PROCEDURE — 80069 RENAL FUNCTION PANEL: CPT | Performed by: INTERNAL MEDICINE

## 2018-05-30 PROCEDURE — 85027 COMPLETE CBC AUTOMATED: CPT | Performed by: INTERNAL MEDICINE

## 2018-05-30 PROCEDURE — G0463 HOSPITAL OUTPT CLINIC VISIT: HCPCS | Mod: ZF

## 2018-05-30 PROCEDURE — 36415 COLL VENOUS BLD VENIPUNCTURE: CPT | Performed by: INTERNAL MEDICINE

## 2018-05-30 RX ORDER — FUROSEMIDE 40 MG
40 TABLET ORAL 2 TIMES DAILY
Qty: 90 TABLET | Refills: 3 | COMMUNITY
Start: 2018-05-30 | End: 2018-09-25 | Stop reason: DRUGHIGH

## 2018-05-30 ASSESSMENT — PAIN SCALES - GENERAL: PAINLEVEL: NO PAIN (0)

## 2018-05-30 NOTE — LETTER
5/30/2018      RE: Austin Walker  [address] [facility] [address]       Nephrology follow up    73yo wM with CKD, DM, hypertension here for follow up. He stopped taking furosemide for a while due to increased urination. Just restarted 40mg BID 3 days ago when he had LE swelling. LE swelling improved with restarting diuretic. He is otherwise well without dysuria, anorexia, insomnia, nausea, vomiting. 4pt ROS negative.    PMHx  PRINCE - uses CPAP  Hypertension  DM - retinopathy, denies neuropathy  CKD - stage 4 with proteinuria     - biopsy: DM nephropathy with arteriosclerosis with no evidence of MGUS related dz  MGUS - follows with [facility]  CVA  Anemia - on darbe, Fe    All/adverse effects - dizziness with medications (amlodipine, terazosin, hydralazine, carvedilol).  Medications reviewed - of note  Losartan 100mg daily  Furosemide 40mg BID   Minoxidil 5mg daily  Metoprolol 25mg BID  Atorvastatin 20 daily  darbe  Fe sulf 325mg BID  NaHCO3 650 BID  Insulin  No NSAIDs    PCP - Dr Vivar [place] [facility]    Exam   171/82   170/75   175/77  Gen - nad  HEENT - neck supple  CV - rrr, +CARON, no rub  Resp - cta bilat  Ext - 1+ edema  Psych - pleasant, appropriate    Labs noted             2010   2012   2014     2015                       2016 2017 2018             5/10   8/21   2/11   2/11  7/1  7/28 11/4    4/4 5/30  Cr         1.6    1.3    2.3    2.1   2.7   4.9-3.1  3.3    2.6-3.5 2.8 3 (eGFR 21)  Ualb/Cr 891    339    Assessment/Recommendations: 73yo wM with CKD and hypertension  CKD - stage 4 with proteinuria due to DM and hypertension. Stable over last couple years. Biopsy did not reveal any MGUS related disease. No acute indication for RRT.   - avoid NSAIDs as possible    Hypertension - volume overloaded. BP not at target (new AHA/ACC target SBP <130mmHg) due to non-adherence. LE edema improving with restarting furosemide. Encouraged him to continue diuretic.    Anemia - mgmt per anemia  clinic.    Ca/Phos/PTH - Ca/phos acceptable; Prior PTH elevated, vitamin D low; given polypharmacy and issues with adherence, will hold off for now.    Acidosis - bicarb low, continue bicarb     RTC in 2 months with Sheryl Duff for BP check, RP, CBC, UACR      Basil Vyas MD

## 2018-05-30 NOTE — NURSING NOTE
"Chief Complaint   Patient presents with     RECHECK     4 mo kidney follow up     /77  Pulse 56  Ht 1.803 m (5' 11\")  Wt 95.9 kg (211 lb 6.4 oz)  SpO2 98%  BMI 29.48 kg/m2  THUAN HERNANDEZ CMA    "

## 2018-05-30 NOTE — MR AVS SNAPSHOT
After Visit Summary   5/30/2018    Austin Walker    MRN: [id number]           Patient Information     Date Of Birth          1945        Visit Information        Provider Department      5/30/2018 3:00 PM Basil Vyas MD [facility] Nephrology        Today's Diagnoses     Essential hypertension, benign    -  1    Type 2 diabetes mellitus with stage 4 chronic kidney disease, with long-term current use of insulin (H)        CKD (chronic kidney disease) stage 4, GFR 15-29 ml/min (H)           Follow-ups after your visit        Follow-up notes from your care team     Return in about 3 months (around 8/30/2018).      Your next 10 appointments already scheduled     Jul 09, 2018 10:00 AM CDT   (Arrive by 9:45 AM)   Return Visit with Karlene Odom PA-C   [facility] and [facility] ([facility])    [address] [place] [address]  4th Floor  [facility] [id number] [phone]            Aug 22, 2018  1:30 PM CDT   Lab with  LAB   [facility] ([facility])    [address] [place] [address]  1st Floor  [facility] [id number] [phone]            Aug 22, 2018  2:30 PM CDT   (Arrive by 2:00 PM)   Return Visit with Tiny Duff NP   [facility]phrology ([facility])    [address] [place] [address]  [address]  [facility] [id number] [phone]              Who to contact     If you have questions or need follow up information about today's clinic visit or your schedule please contact [facility] NEPHROLOGY directly at [phone].  Normal or non-critical lab and imaging results will be communicated to you by MyChart, letter or phone within 4 business days after the clinic has received the results. If you do not hear from us within 7 days, please contact the clinic through MyChart or phone. If you have a critical or abnormal lab result, we will notify you by phone as soon as possible.  Submit  "refill requests through [employer] or call your pharmacy and they will forward the refill request to us. Please allow 3 business days for your refill to be completed.          Additional Information About Your Visit        [employer] Information     [employer] gives you secure access to your electronic health record. If you see a primary care provider, you can also send messages to your care team and make appointments. If you have questions, please call your primary care clinic.  If you do not have a primary care provider, please call [phone] and they will assist you.        Care EveryWhere ID     This is your Care EveryWhere ID. This could be used by other organizations to access your [facility] medical records  [id number]        Your Vitals Were     Pulse Height Pulse Oximetry BMI (Body Mass Index)          56 1.803 m (5' 11\") 98% 29.48 kg/m2         Blood Pressure from Last 3 Encounters:   05/30/18 175/77   04/09/18 137/71   04/04/18 119/87    Weight from Last 3 Encounters:   05/30/18 95.9 kg (211 lb 6.4 oz)   04/09/18 102.5 kg (226 lb)   02/12/18 99.8 kg (220 lb)              Today, you had the following     No orders found for display         Today's Medication Changes          These changes are accurate as of 5/30/18  3:53 PM.  If you have any questions, ask your nurse or doctor.               These medicines have changed or have updated prescriptions.        Dose/Directions    insulin detemir 100 UNIT/ML injection   Commonly known as:  LEVEMIR FLEXPEN/FLEXTOUCH   This may have changed:    - how much to take  - additional instructions   Used for:  Diabetes mellitus, type 2 (H)        Dose:  10 Units   Inject 10 Units Subcutaneous 2 times daily Dose was originally 50 units BID but pt has been cutting back   Refills:  0                Primary Care Provider Office Phone # Fax #    Celso Esparaz [phone]       [facility] [address] [facility] [address]  [facility] [address]      "   Equal Access to Services     [facility] : Hadii lili jasmine valentinemadhuri Deepak, waaxda luqadaha, qaybta kaalmahunter uribe, awilda leigh. So [facility] [phone].    ATENCIÓN: Si habla español, tiene a schaefer disposición servicios gratuitos de asistencia lingüística. Llame al [phone].    We comply with applicable federal civil rights laws and [place] laws. We do not discriminate on the basis of race, color, national origin, age, disability, sex, sexual orientation, or gender identity.            Thank you!     Thank you for choosing [facility] NEPHROLOGY  for your care. Our goal is always to provide you with excellent care. Hearing back from our patients is one way we can continue to improve our services. Please take a few minutes to complete the written survey that you may receive in the mail after your visit with us. Thank you!             Your Updated Medication List - Protect others around you: Learn how to safely use, store and throw away your medicines at www.disposemymeds.org.          This list is accurate as of 5/30/18  3:53 PM.  Always use your most recent med list.                   Brand Name Dispense Instructions for use Diagnosis    amphetamine-dextroamphetamine 10 MG per tablet    ADDERALL     Take 10 mg by mouth 3 times daily (before meals)        ARANESP (ALBUMIN FREE) 300 MCG/ML Soln   Generic drug:  darbepoetin aaliyah-polysorbate      Inject 300 mcg Subcutaneous every 14 days As needed for hgb <=11g/dL        ASPIRIN EC PO      Take 81 mg by mouth daily        atorvastatin 20 MG tablet    LIPITOR     Take 20 mg by mouth every evening 1700        B-D U/F 31G X 8 MM   Generic drug:  insulin pen needle      USE AS DIRECTED        blood glucose monitoring lancets      3x/day with blood glucose testing        ferrous sulfate 325 (65 Fe) MG tablet    IRON     Take 325 mg by mouth 2 times daily        FLONASE 50 MCG/ACT spray   Generic drug:  fluticasone      Spray 1 spray into both  nostrils 2 times daily as needed        gabapentin 300 MG capsule    NEURONTIN     Take 300 mg by mouth daily He takes 300mg about every 3 days        11/11/15  Pt states he takes 300mg 2x/day        GLUCOSAMINE SULFATE PO      Take by mouth daily        insulin aspart 100 UNIT/ML injection    NovoLOG PEN     Inject subcutaneous 3 times daily with meals and bedtime?Medium Scale?less than 70 .................. Refer to Hypoglycemia?Treatment Protocol?70 - 149 ......................... No corrective insulin?150 - 199 ....................... 2 units?200 - 249 ....................... 4 units?250 - 299 ....................... 6 units?300 - 349 ....................... 8 units?350 or greater ................ 10 units and notify MD        insulin detemir 100 UNIT/ML injection    LEVEMIR FLEXPEN/FLEXTOUCH     Inject 10 Units Subcutaneous 2 times daily Dose was originally 50 units BID but pt has been cutting back    Diabetes mellitus, type 2 (H)       LASIX 40 MG tablet   Generic drug:  furosemide     90 tablet    Take 1 tablet (40 mg) by mouth 2 times daily    Essential hypertension, benign       loratadine 10 MG tablet    CLARITIN     Take 10 mg by mouth daily        losartan 100 MG tablet    COZAAR    90 tablet    TAKE 1 TABLET BY MOUTH DAILY    HTN (hypertension)       meclizine 25 MG tablet    ANTIVERT     Take 25 mg by mouth 2 times daily        metoprolol tartrate 25 MG tablet    LOPRESSOR    180 tablet    TAKE 1 TABLET(25 MG) BY MOUTH TWICE DAILY    HTN (hypertension)       * minoxidil 2.5 MG tablet    LONITEN    180 tablet    TAKE 2 TABLETS BY MOUTH DAILY.    HTN (hypertension), CKD (chronic kidney disease) stage 4, GFR 15-29 ml/min (H)       * minoxidil 2.5 MG tablet    LONITEN    60 tablet    TAKE 2 TABLETS BY MOUTH DAILY.    Hypertension, CKD (chronic kidney disease) stage 4, GFR 15-29 ml/min (H)       MULTI-VITAMINS Tabs      Take 1 tablet by mouth daily        NUVIGIL PO      Take 200 mg by mouth every morning         omeprazole 20 MG CR capsule    priLOSEC     Take 20 mg by mouth daily        OSTEO BI-FLEX ADV TRIPLE ST PO      Take by mouth daily        sodium bicarbonate 650 MG tablet     120 tablet    Take 1 tablet (650 mg) by mouth 2 times daily    CKD (chronic kidney disease)       solifenacin 5 MG tablet    VESICARE    30 tablet    Take 1 tablet (5 mg) by mouth daily    Urge incontinence       traZODone 50 MG tablet    DESYREL     Take 50 mg by mouth At Bedtime        UNABLE TO FIND      Take by mouth daily MEDICATION NAME: Brain Peak        zolpidem 5 MG tablet    AMBIEN     Take 5 mg by mouth        * Notice:  This list has 2 medication(s) that are the same as other medications prescribed for you. Read the directions carefully, and ask your doctor or other care provider to review them with you.

## 2018-05-30 NOTE — PROGRESS NOTES
Nephrology follow up    73yo wM with CKD, DM, hypertension here for follow up. He stopped taking furosemide for a while due to increased urination. Just restarted 40mg BID 3 days ago when he had LE swelling. LE swelling improved with restarting diuretic. He is otherwise well without dysuria, anorexia, insomnia, nausea, vomiting. 4pt ROS negative.    PMHx  PRINCE - uses CPAP  Hypertension  DM - retinopathy, denies neuropathy  CKD - stage 4 with proteinuria     - biopsy: DM nephropathy with arteriosclerosis with no evidence of MGUS related dz  MGUS - follows with [facility]  CVA  Anemia - on darbe, Fe    All/adverse effects - dizziness with medications (amlodipine, terazosin, hydralazine, carvedilol).  Medications reviewed - of note  Losartan 100mg daily  Furosemide 40mg BID   Minoxidil 5mg daily  Metoprolol 25mg BID  Atorvastatin 20 daily  darbe  Fe sulf 325mg BID  NaHCO3 650 BID  Insulin  No NSAIDs    PCP - Dr Vivar, [place] [facility]    Exam   171/82   170/75   175/77  Gen - nad  HEENT - neck supple  CV - rrr, +CARON, no rub  Resp - cta bilat  Ext - 1+ edema  Psych - pleasant, appropriate    Labs noted             2010   2012   2014     2015                       2016 2017 2018             5/10   8/21   2/11   2/11  7/1  7/28 11/4    4/4 5/30  Cr         1.6    1.3    2.3    2.1   2.7   4.9-3.1  3.3    2.6-3.5 2.8 3 (eGFR 21)  Ualb/Cr 891    339    Assessment/Recommendations: 73yo wM with CKD and hypertension  CKD - stage 4 with proteinuria due to DM and hypertension. Stable over last couple years. Biopsy did not reveal any MGUS related disease. No acute indication for RRT.   - avoid NSAIDs as possible    Hypertension - volume overloaded. BP not at target (new AHA/ACC target SBP <130mmHg) due to non-adherence. LE edema improving with restarting furosemide. Encouraged him to continue diuretic.    Anemia - mgmt per anemia clinic.    Ca/Phos/PTH - Ca/phos acceptable; Prior PTH elevated, vitamin D low; given  polypharmacy and issues with adherence, will hold off for now.    Acidosis - bicarb low, continue bicarb     RTC in 2 months with Sheryl Omega for BP check, RP, CBC, UACR

## 2018-06-20 ENCOUNTER — PRE VISIT (OUTPATIENT)
Dept: UROLOGY | Facility: CLINIC | Age: 73
End: 2018-06-20

## 2018-06-20 NOTE — TELEPHONE ENCOUNTER
Patient is coming in to see Karlene Odom PA-C for voiding diary, PVR, and sympton check. Called patient to please have voiding diary to appointment.

## 2018-07-06 DIAGNOSIS — N39.41 URGE INCONTINENCE: ICD-10-CM

## 2018-07-07 RX ORDER — SOLIFENACIN SUCCINATE 5 MG/1
5 TABLET, FILM COATED ORAL DAILY
Qty: 30 TABLET | Refills: 5 | OUTPATIENT
Start: 2018-07-07

## 2018-07-09 ENCOUNTER — OFFICE VISIT (OUTPATIENT)
Dept: UROLOGY | Facility: CLINIC | Age: 73
End: 2018-07-09
Payer: COMMERCIAL

## 2018-07-09 VITALS
DIASTOLIC BLOOD PRESSURE: 56 MMHG | SYSTOLIC BLOOD PRESSURE: 124 MMHG | HEIGHT: 71 IN | BODY MASS INDEX: 28.42 KG/M2 | HEART RATE: 64 BPM | WEIGHT: 203 LBS

## 2018-07-09 DIAGNOSIS — N39.41 URGE INCONTINENCE: ICD-10-CM

## 2018-07-09 ASSESSMENT — PAIN SCALES - GENERAL: PAINLEVEL: NO PAIN (0)

## 2018-07-09 NOTE — PATIENT INSTRUCTIONS
UROLOGY CLINIC VISIT PATIENT INSTRUCTIONS    1) Continue taking Vesicare 5 mg once daily. I will talk to your kidney doctor about increasing the dose to 10 mg. If he approves, I will give you a call and send a new prescription.    Follow up with me in 2 months to recheck.     If you have any issues, questions or concerns in the meantime, do not hesitate to contact us at [phone] or via [facility].     It was a pleasure meeting with you today.  Thank you for allowing me and my team the privilege of caring for you today.  YOU are the reason we are here, and I truly hope we provided you with the excellent service you deserve.  Please let us know if there is anything else we can do for you so that we can be sure you are leaving completely satisfied with your care experience.

## 2018-07-09 NOTE — PROGRESS NOTES
"UROLOGY OFFICE VISIT - FOLLOW UP    REASON FOR VISIT: follow up urinary frequency, urgency, incontinence; review bladder diaries    HPI: Mr. Austin Walker is a 72 year old male with a PMH of CKD stage 4 with proteinuria 2/2 DM and HTN (awaiting transplant listing, not currently on dialysis), MGUS (follows with [facility]), PRINCE, and h/o CVA who returns to the urology clinic for follow up of urinary frequency, urgency, and incontinence. Seen in initial consultation on 2/12/18 - please see consult note from this date for full history. In brief, he has had bothersome urinary symptoms for the past 3 years, progressively worsening. Incontinence started after he began taking Lasix (80 mg in the AM, 60 mg in the PM, ~2pm). He has multiple episodes of incontinence per day - goes through 4-6 Depends briefs per day. Leakage mainly occurs during the day, rarely overnight. Gets up 0-1 times per night to void. Will leak when he goes from supine or sitting to standing. States that urine just \"pours out.\" Also reports significant urgency and urge incontinence. No h/o UTI's or kidney stones. Negative UA through primary care office in January 2018. No hematuria, dysuria, needing to strain to void. Subjectively, force of stream is \"very strong.\" Occasional sensation of incomplete emptying but PVR at last office visit was 11 mL. Normally sits to void. Drinks 1 pot of coffee per day, 2 glasses of diet ginger ale, 1 glass of OJ in the AM. Initially, he was recommended to limit bladder irritants and complete bladder diaries. At a follow up appointment on 4/9/18, he reported decreasing coffee consumption from 1 pot per day to 1 cup per day. Despite this, he had not noticed any improvement in urinary symptoms. In fact, he thought things may be getting worse with more frequent nighttime incontinence now than ever before. He was therefore started on Vesicare 5 mg daily and asked to complete another bladder diary. Returns for follow up " "today and reports \"night and day difference\" in his symptoms. He has less urgency, less incontinence, and is pleased overall. He does continue to have some incontinence, but it is much less volume then previously. He wonders about increasing the dose as the effects seem to wear off toward the end of the day. Completed another bladder diary. Results summarized below:    Bladder Diary (PRIOR TO VESICARE):  Voids q30-90 minutes during the day, nocturia x 0-1.   Episodes of incontinence: multiple per day, goes through 4-6 Depends per day.  Incontinence associated with: sitting to standing, strong urge  Volume Intake: 1 cup of coffee, 2-4 glasses of water, 1 glass of OJ  Voided Volume: 1890 mL per day; average voided volume: 150 mL; largest voided volume: 280 mL    Bladder Diary (AFTER STARTING VESICARE):  Voids q2 hours during the day, nocturia x 0-1.   Episodes of incontinence: 3 per day, goes through 4 Depends per day.  Incontinence associated with: strong urge  Volume Intake: 5 cups of coffee, 1 glass of OJ  Voided Volume: 1800 mL per day; average voided volume: 225 mL; largest voided volume: 295 mL    PEx  /56  Pulse 64  Ht 1.803 m (5' 11\")  Wt 92.1 kg (203 lb)  BMI 28.31 kg/m2  GEN: well-appearing male in NAD  RESP: no increased respiratory effort    PSA   Date Value Ref Range Status   12/20/2017 0.25 0 - 4 ug/L Final     Comment:     Assay Method:  Chemiluminescence using Siemens Vista analyzer       Creatinine   Date Value Ref Range Status   05/30/2018 2.97 (H) 0.66 - 1.25 mg/dL Final       A/P  72 year old male with urinary frequency, urgency, and urge incontinence. Symptoms started after he began taking lasix 3 years ago - takes 80 mg qAM and 60 mg qPM (around 2pm). Also drinks a pot of coffee + juice + 2 cans of diet ginger ale per day with minimal water. Recent UA was negative, making infection unlikely. Uroflow at last office visit showed decreased Qmax (16.6 ml/s) but good bladder emptying (PVR 11 " mL). Initial voiding diaries (prior to Vesicare) revealed frequent voids (q30-90 minutes) during the day and voiding in small volumes (100-280 mL per void). He has been working on limiting bladder irritants without significant improvement in frequency, urgency, incontinence. Suspect that diuretics are largely contributing to symptoms, but may have a component of OAB as well. He was started on low dose Vesicare 5 mg daily which has been working well. Continues to have some incontinence but it is less frequent and less volume. Repeat bladder diary on the medication reveals slightly higher voided volumes that are less frequent. He is happy with his progress but wonders about increasing the dose for even further benefit.  -Attempted to order Vesicare 10 mg daily but this is contraindicated given CrCl < 30 mL/min.   -Will therefore continue with Vesicare 5 mg once daily.  -Continue to limit bladder irritants.     Follow up in 2 months for recheck, sooner with new issues. If no further improvement, consider UDS.    15 minutes spent with the patient, >50% in counseling and coordination of care.    Karlene Odom PA-C  Urology

## 2018-07-09 NOTE — MR AVS SNAPSHOT
After Visit Summary   7/9/2018    Austin Walker    MRN: [id number]           Patient Information     Date Of Birth          1945        Visit Information        Provider Department      7/9/2018 10:00 AM Karlene Odom PA-C [facility] Urology and [facility] Urologic [facility]        Today's Diagnoses     Urge incontinence          Care Instructions    UROLOGY CLINIC VISIT PATIENT INSTRUCTIONS    1) Continue taking Vesicare 5 mg once daily. I will talk to your kidney doctor about increasing the dose to 10 mg. If he approves, I will give you a call and send a new prescription.    Follow up with me in 2 months to recheck.     If you have any issues, questions or concerns in the meantime, do not hesitate to contact us at [phone] or via [employer].     It was a pleasure meeting with you today.  Thank you for allowing me and my team the privilege of caring for you today.  YOU are the reason we are here, and I truly hope we provided you with the excellent service you deserve.  Please let us know if there is anything else we can do for you so that we can be sure you are leaving completely satisfied with your care experience.                Follow-ups after your visit        Your next 10 appointments already scheduled     Aug 22, 2018  1:30 PM CDT   Lab with  LAB   [facility] ([facility])    [address]  [facility] [address] [phone]            Aug 22, 2018  2:30 PM CDT   (Arrive by 2:00 PM)   Return Visit with Tiny Duff NP   [facility] Nephrology ([facility])    [address] [place] [address]  [address]  [facility] [address] [phone]              Who to contact     Please call your clinic at [phone] to:    Ask questions about your health    Make or cancel appointments    Discuss your medicines    Learn about your test results    Speak to your doctor            Additional Information About  "Your Visit        MyChart Information     [employer] gives you secure access to your electronic health record. If you see a primary care provider, you can also send messages to your care team and make appointments. If you have questions, please call your primary care clinic.  If you do not have a primary care provider, please call [phone] and they will assist you.      [employer] is an electronic gateway that provides easy, online access to your medical records. With [employer], you can request a clinic appointment, read your test results, renew a prescription or communicate with your care team.     To access your existing account, please contact your [facility] or call [phone] for assistance.        Care EveryWhere ID     This is your Care EveryWhere ID. This could be used by other organizations to access your [facility] medical records  [id number]        Your Vitals Were     Pulse Height BMI (Body Mass Index)             64 1.803 m (5' 11\") 28.31 kg/m2          Blood Pressure from Last 3 Encounters:   07/09/18 124/56   05/30/18 175/77   04/09/18 137/71    Weight from Last 3 Encounters:   07/09/18 92.1 kg (203 lb)   05/30/18 95.9 kg (211 lb 6.4 oz)   04/09/18 102.5 kg (226 lb)              Today, you had the following     No orders found for display         Today's Medication Changes          These changes are accurate as of 7/9/18 10:21 AM.  If you have any questions, ask your nurse or doctor.               These medicines have changed or have updated prescriptions.        Dose/Directions    insulin detemir 100 UNIT/ML injection   Commonly known as:  LEVEMIR FLEXPEN/FLEXTOUCH   This may have changed:    - how much to take  - additional instructions   Used for:  Diabetes mellitus, type 2 (H)        Dose:  10 Units   Inject 10 Units Subcutaneous 2 times daily Dose was originally 50 units BID but pt has been cutting back   Refills:  0                Primary Care Provider Office Phone # " Fax #    Celso Esparza [phone]       [facility] [address]  [place] [id number]        Equal Access to Services     ENRRIQUE LACY : Gisela jasmine valentineo Socarl, waaxda luqadaha, qaybta kaalmada adenori, awilda norris kikealvaro cid neema leigh. So [facility] [phone].    ATENCIÓN: Si habla español, tiene a schaefer disposición servicios gratuitos de asistencia lingüística. Edgardo al [phone].    We comply with applicable federal civil rights laws and [place] laws. We do not discriminate on the basis of race, color, national origin, age, disability, sex, sexual orientation, or gender identity.            Thank you!     Thank you for choosing [facility] UROLOGY AND [facility] FOR PROSTATE AND UROLOGIC CANCERS  for your care. Our goal is always to provide you with excellent care. Hearing back from our patients is one way we can continue to improve our services. Please take a few minutes to complete the written survey that you may receive in the mail after your visit with us. Thank you!             Your Updated Medication List - Protect others around you: Learn how to safely use, store and throw away your medicines at www.disposemymeds.org.          This list is accurate as of 7/9/18 10:21 AM.  Always use your most recent med list.                   Brand Name Dispense Instructions for use Diagnosis    amphetamine-dextroamphetamine 10 MG per tablet    ADDERALL     Take 10 mg by mouth 3 times daily (before meals)        ARANESP (ALBUMIN FREE) 300 MCG/ML Soln   Generic drug:  darbepoetin aaliyah-polysorbate      Inject 300 mcg Subcutaneous every 14 days As needed for hgb <=11g/dL        ASPIRIN EC PO      Take 81 mg by mouth daily        atorvastatin 20 MG tablet    LIPITOR     Take 20 mg by mouth every evening 1700        B-D U/F 31G X 8 MM   Generic drug:  insulin pen needle      USE AS DIRECTED        blood glucose monitoring lancets      3x/day with blood glucose  testing        ferrous sulfate 325 (65 Fe) MG tablet    IRON     Take 325 mg by mouth 2 times daily        FLONASE 50 MCG/ACT spray   Generic drug:  fluticasone      Spray 1 spray into both nostrils 2 times daily as needed        gabapentin 300 MG capsule    NEURONTIN     Take 300 mg by mouth daily He takes 300mg about every 3 days        11/11/15  Pt states he takes 300mg 2x/day        GLUCOSAMINE SULFATE PO      Take by mouth daily        insulin aspart 100 UNIT/ML injection    NovoLOG PEN     Inject subcutaneous 3 times daily with meals and bedtime?Medium Scale?less than 70 .................. Refer to Hypoglycemia?Treatment Protocol?70 - 149 ......................... No corrective insulin?150 - 199 ....................... 2 units?200 - 249 ....................... 4 units?250 - 299 ....................... 6 units?300 - 349 ....................... 8 units?350 or greater ................ 10 units and notify MD        insulin detemir 100 UNIT/ML injection    LEVEMIR FLEXPEN/FLEXTOUCH     Inject 10 Units Subcutaneous 2 times daily Dose was originally 50 units BID but pt has been cutting back    Diabetes mellitus, type 2 (H)       LASIX 40 MG tablet   Generic drug:  furosemide     90 tablet    Take 1 tablet (40 mg) by mouth 2 times daily    Essential hypertension, benign       loratadine 10 MG tablet    CLARITIN     Take 10 mg by mouth daily        losartan 100 MG tablet    COZAAR    90 tablet    TAKE 1 TABLET BY MOUTH DAILY    HTN (hypertension)       meclizine 25 MG tablet    ANTIVERT     Take 25 mg by mouth 2 times daily        metoprolol tartrate 25 MG tablet    LOPRESSOR    180 tablet    TAKE 1 TABLET(25 MG) BY MOUTH TWICE DAILY    HTN (hypertension)       * minoxidil 2.5 MG tablet    LONITEN    180 tablet    TAKE 2 TABLETS BY MOUTH DAILY.    HTN (hypertension), CKD (chronic kidney disease) stage 4, GFR 15-29 ml/min (H)       * minoxidil 2.5 MG tablet    LONITEN    60 tablet    TAKE 2 TABLETS BY MOUTH DAILY.     Hypertension, CKD (chronic kidney disease) stage 4, GFR 15-29 ml/min (H)       MULTI-VITAMINS Tabs      Take 1 tablet by mouth daily        NUVIGIL PO      Take 200 mg by mouth every morning        omeprazole 20 MG CR capsule    priLOSEC     Take 20 mg by mouth daily        OSTEO BI-FLEX ADV TRIPLE ST PO      Take by mouth daily        sodium bicarbonate 650 MG tablet     120 tablet    Take 1 tablet (650 mg) by mouth 2 times daily    CKD (chronic kidney disease)       solifenacin 5 MG tablet    VESICARE    30 tablet    Take 1 tablet (5 mg) by mouth daily    Urge incontinence       traZODone 50 MG tablet    DESYREL     Take 50 mg by mouth At Bedtime        UNABLE TO FIND      Take by mouth daily MEDICATION NAME: Brain Peak        zolpidem 5 MG tablet    AMBIEN     Take 5 mg by mouth        * Notice:  This list has 2 medication(s) that are the same as other medications prescribed for you. Read the directions carefully, and ask your doctor or other care provider to review them with you.

## 2018-07-09 NOTE — LETTER
"7/9/2018                         RE: Austin Walker  [address] [facility] [address]     Dear Colleague,     Thank you for referring your patient, Austin Walker, to the [facility] UROLOGY AND INST FOR PROSTATE AND UROLOGIC CANCERS at [facility]. Please see a copy of my visit note below.    UROLOGY OFFICE VISIT - FOLLOW UP    REASON FOR VISIT: follow up urinary frequency, urgency, incontinence; review bladder diaries    HPI: Mr. Austin Walker is a 72 year old male with a PMH of CKD stage 4 with proteinuria 2/2 DM and HTN (awaiting transplant listing, not currently on dialysis), MGUS (follows with [facility]), PRINCE, and h/o CVA who returns to the urology clinic for follow up of urinary frequency, urgency, and incontinence. Seen in initial consultation on 2/12/18 - please see consult note from this date for full history. In brief, he has had bothersome urinary symptoms for the past 3 years, progressively worsening. Incontinence started after he began taking Lasix (80 mg in the AM, 60 mg in the PM, ~2pm). He has multiple episodes of incontinence per day - goes through 4-6 Depends briefs per day. Leakage mainly occurs during the day, rarely overnight. Gets up 0-1 times per night to void. Will leak when he goes from supine or sitting to standing. States that urine just \"pours out.\" Also reports significant urgency and urge incontinence. No h/o UTI's or kidney stones. Negative UA through primary care office in January 2018. No hematuria, dysuria, needing to strain to void. Subjectively, force of stream is \"very strong.\" Occasional sensation of incomplete emptying but PVR at last office visit was 11 mL. Normally sits to void. Drinks 1 pot of coffee per day, 2 glasses of diet ginger ale, 1 glass of OJ in the AM. Initially, he was recommended to limit bladder irritants and complete bladder diaries. At a follow up appointment on 4/9/18, he reported decreasing coffee " "consumption from 1 pot per day to 1 cup per day. Despite this, he had not noticed any improvement in urinary symptoms. In fact, he thought things may be getting worse with more frequent nighttime incontinence now than ever before. He was therefore started on Vesicare 5 mg daily and asked to complete another bladder diary. Returns for follow up today and reports \"night and day difference\" in his symptoms. He has less urgency, less incontinence, and is pleased overall. He does continue to have some incontinence, but it is much less volume then previously. He wonders about increasing the dose as the effects seem to wear off toward the end of the day. Completed another bladder diary. Results summarized below:    Bladder Diary (PRIOR TO VESICARE):  Voids q30-90 minutes during the day, nocturia x 0-1.   Episodes of incontinence: multiple per day, goes through 4-6 Depends per day.  Incontinence associated with: sitting to standing, strong urge  Volume Intake: 1 cup of coffee, 2-4 glasses of water, 1 glass of OJ  Voided Volume: 1890 mL per day; average voided volume: 150 mL; largest voided volume: 280 mL    Bladder Diary (AFTER STARTING VESICARE):  Voids q2 hours during the day, nocturia x 0-1.   Episodes of incontinence: 3 per day, goes through 4 Depends per day.  Incontinence associated with: strong urge  Volume Intake: 5 cups of coffee, 1 glass of OJ  Voided Volume: 1800 mL per day; average voided volume: 225 mL; largest voided volume: 295 mL    PEx  /56  Pulse 64  Ht 1.803 m (5' 11\")  Wt 92.1 kg (203 lb)  BMI 28.31 kg/m2  GEN: well-appearing male in NAD  RESP: no increased respiratory effort    PSA   Date Value Ref Range Status   12/20/2017 0.25 0 - 4 ug/L Final     Comment:     Assay Method:  Chemiluminescence using Siemens Vista analyzer       Creatinine   Date Value Ref Range Status   05/30/2018 2.97 (H) 0.66 - 1.25 mg/dL Final       A/P  72 year old male with urinary frequency, urgency, and urge " incontinence. Symptoms started after he began taking lasix 3 years ago - takes 80 mg qAM and 60 mg qPM (around 2pm). Also drinks a pot of coffee + juice + 2 cans of diet ginger ale per day with minimal water. Recent UA was negative, making infection unlikely. Uroflow at last office visit showed decreased Qmax (16.6 ml/s) but good bladder emptying (PVR 11 mL).  Initial voiding diaries (prior to Vesicare) revealed frequent voids (q30-90 minutes) during the day and voiding in small volumes (100-280 mL per void). He has been working on limiting bladder irritants without significant improvement in frequency, urgency, incontinence. Suspect that diuretics are largely contributing to symptoms, but may have a component of OAB as well. He was started on low dose Vesicare 5 mg daily which has been working well. Continues to have some incontinence but it is less frequent and less volume. Repeat bladder diary on the medication reveals slightly higher voided volumes that are less frequent. He is happy with his progress but wonders about increasing the dose for even further benefit.  -Attempted to order Vesicare 10 mg daily but this is contraindicated given CrCl < 30 mL/min.   -Will therefore continue with Vesicare 5 mg once daily.  -Continue to limit bladder irritants.     Follow up in 2 months for recheck, sooner with new issues. If no further improvement, consider UDS.    15 minutes spent with the patient, >50% in counseling and coordination of care.    Karlene Odom PA-C  Urology

## 2018-07-18 ENCOUNTER — TELEPHONE (OUTPATIENT)
Dept: UROLOGY | Facility: CLINIC | Age: 73
End: 2018-07-18

## 2018-07-18 NOTE — TELEPHONE ENCOUNTER
Health Call Center    Phone Message    May a detailed message be left on voicemail: yes    Reason for Call: Medication Refill Request    Has the patient contacted the pharmacy for the refill? Yes   Name of medication being requested: solifenacin (VESICARE) 5 MG   Provider who prescribed the medication: Karlene Odom  Pharmacy: [facility] [address] [place] [address] [address] AT [facility] [address]  Date medication is needed: As soon as possible         Action Taken: Message routed to:  [facility] ([facility]): MED REFILLS

## 2018-07-19 NOTE — TELEPHONE ENCOUNTER
"Discussed with the patient that he just picked up his order for 5 mg tabs on 7-3-18 per pharmacy and has 2 refills remaining. He states he was instructed to increase to 10 mg daily.    Note from 7/9/18 reviewed with patient:  \"-Attempted to order Vesicare 10 mg daily but this is contraindicated given CrCl < 30 mL/min.   -Will therefore continue with Vesicare 5 mg once daily.\"    He would like to discuss with one of the clinic nurses.      Kathleen M Doege RN    "

## 2018-07-19 NOTE — TELEPHONE ENCOUNTER
Attempted to leave voicemail to have patient call back to discuss Vesicare prescription but the mailbox was full.    Mari Oh RN  Care Coordinator- Reconstructive Urology

## 2018-08-21 ENCOUNTER — PATIENT OUTREACH (OUTPATIENT)
Dept: CARE COORDINATION | Facility: CLINIC | Age: 73
End: 2018-08-21

## 2018-08-21 ENCOUNTER — PRE VISIT (OUTPATIENT)
Dept: UROLOGY | Facility: CLINIC | Age: 73
End: 2018-08-21

## 2018-08-21 ENCOUNTER — TELEPHONE (OUTPATIENT)
Dept: NEPHROLOGY | Facility: CLINIC | Age: 73
End: 2018-08-21

## 2018-08-21 DIAGNOSIS — N18.4 CKD (CHRONIC KIDNEY DISEASE) STAGE 4, GFR 15-29 ML/MIN (H): Primary | ICD-10-CM

## 2018-08-21 NOTE — TELEPHONE ENCOUNTER
Patient is coming in to see Karlene Odom PA-C for a 2 month follow up with a bladder diary,called patient to please have bladder diary with him

## 2018-08-22 ENCOUNTER — OFFICE VISIT (OUTPATIENT)
Dept: NEPHROLOGY | Facility: CLINIC | Age: 73
End: 2018-08-22
Payer: MEDICARE

## 2018-08-22 VITALS
OXYGEN SATURATION: 99 % | WEIGHT: 203 LBS | TEMPERATURE: 97.4 F | BODY MASS INDEX: 28.31 KG/M2 | DIASTOLIC BLOOD PRESSURE: 69 MMHG | HEART RATE: 61 BPM | SYSTOLIC BLOOD PRESSURE: 127 MMHG

## 2018-08-22 DIAGNOSIS — N18.4 CKD (CHRONIC KIDNEY DISEASE) STAGE 4, GFR 15-29 ML/MIN (H): Primary | ICD-10-CM

## 2018-08-22 DIAGNOSIS — N18.4 CKD (CHRONIC KIDNEY DISEASE) STAGE 4, GFR 15-29 ML/MIN (H): ICD-10-CM

## 2018-08-22 DIAGNOSIS — I10 BENIGN ESSENTIAL HYPERTENSION: ICD-10-CM

## 2018-08-22 DIAGNOSIS — D63.1 ANEMIA OF CHRONIC RENAL FAILURE, STAGE 4 (SEVERE) (H): ICD-10-CM

## 2018-08-22 DIAGNOSIS — N18.4 ANEMIA OF CHRONIC RENAL FAILURE, STAGE 4 (SEVERE) (H): ICD-10-CM

## 2018-08-22 DIAGNOSIS — E87.20 ACIDOSIS: ICD-10-CM

## 2018-08-22 LAB
ALBUMIN SERPL-MCNC: 3.9 G/DL (ref 3.4–5)
ANION GAP SERPL CALCULATED.3IONS-SCNC: 10 MMOL/L (ref 3–14)
BUN SERPL-MCNC: 66 MG/DL (ref 7–30)
CALCIUM SERPL-MCNC: 8.8 MG/DL (ref 8.5–10.1)
CHLORIDE SERPL-SCNC: 106 MMOL/L (ref 94–109)
CO2 SERPL-SCNC: 23 MMOL/L (ref 20–32)
CREAT SERPL-MCNC: 3.5 MG/DL (ref 0.66–1.25)
ERYTHROCYTE [DISTWIDTH] IN BLOOD BY AUTOMATED COUNT: 13.7 % (ref 10–15)
GFR SERPL CREATININE-BSD FRML MDRD: 17 ML/MIN/1.7M2
GLUCOSE SERPL-MCNC: 227 MG/DL (ref 70–99)
HCT VFR BLD AUTO: 29.9 % (ref 40–53)
HGB BLD-MCNC: 10.5 G/DL (ref 13.3–17.7)
MCH RBC QN AUTO: 33.3 PG (ref 26.5–33)
MCHC RBC AUTO-ENTMCNC: 35.1 G/DL (ref 31.5–36.5)
MCV RBC AUTO: 95 FL (ref 78–100)
PHOSPHATE SERPL-MCNC: 3.9 MG/DL (ref 2.5–4.5)
PLATELET # BLD AUTO: 205 10E9/L (ref 150–450)
POTASSIUM SERPL-SCNC: 4.6 MMOL/L (ref 3.4–5.3)
RBC # BLD AUTO: 3.15 10E12/L (ref 4.4–5.9)
SODIUM SERPL-SCNC: 139 MMOL/L (ref 133–144)
WBC # BLD AUTO: 4.5 10E9/L (ref 4–11)

## 2018-08-22 PROCEDURE — 36415 COLL VENOUS BLD VENIPUNCTURE: CPT

## 2018-08-22 PROCEDURE — 85027 COMPLETE CBC AUTOMATED: CPT

## 2018-08-22 PROCEDURE — G0463 HOSPITAL OUTPT CLINIC VISIT: HCPCS | Mod: ZF

## 2018-08-22 PROCEDURE — 80069 RENAL FUNCTION PANEL: CPT

## 2018-08-22 ASSESSMENT — PAIN SCALES - GENERAL: PAINLEVEL: NO PAIN (0)

## 2018-08-22 NOTE — MR AVS SNAPSHOT
After Visit Summary   8/22/2018    Austin Walker    MRN: [id number]           Patient Information     Date Of Birth          1945        Visit Information        Provider Department      8/22/2018 2:30 PM Tiny Duff NP [facility]        Care Instructions    1. Bring in all medications next visit  2. Check blood pressure 2-3 times/week and bring in readings to next visit          Follow-ups after your visit        Follow-up notes from your care team     Return in about 4 weeks (around 9/19/2018).      Your next 10 appointments already scheduled     Sep 10, 2018 10:30 AM CDT   (Arrive by 10:15 AM)   Return Visit with Karlene Odom PA-C   [facility] ([facility])    [address] [place] [address]  4th Floor  [facility] [id number] [phone]            Sep 25, 2018  1:30 PM CDT   Lab with  LAB   [facility] ([facility])    [address] [place] [address]  1st Floor  [facility] [address] [phone]            Sep 25, 2018  2:30 PM CDT   (Arrive by 2:00 PM)   Return Visit with Tiny Duff NP   [facility] ([facility])    [address] [place] [address]  [address]  [facility] [id number] [phone]              Future tests that were ordered for you today     Open Future Orders        Priority Expected Expires Ordered    Albumin Random Urine Quantitative with Creat Ratio Routine  8/22/2019 8/22/2018            Who to contact     If you have questions or need follow up information about today's clinic visit or your schedule please contact [facility] directly at [phone].  Normal or non-critical lab and imaging results will be communicated to you by MyChart, letter or phone within 4 business days after the clinic has received the results. If you do not hear from us within 7 days, please contact the clinic through MyChart or phone. If  you have a critical or abnormal lab result, we will notify you by phone as soon as possible.  Submit refill requests through [employer] or call your pharmacy and they will forward the refill request to us. Please allow 3 business days for your refill to be completed.          Additional Information About Your Visit        [id number] Information     [employer] gives you secure access to your electronic health record. If you see a primary care provider, you can also send messages to your care team and make appointments. If you have questions, please call your primary care clinic.  If you do not have a primary care provider, please call [phone] and they will assist you.        Care EveryWhere ID     This is your Care EveryWhere ID. This could be used by other organizations to access your [facility] medical records  [id number]        Your Vitals Were     Pulse Temperature Pulse Oximetry BMI (Body Mass Index)          61 97.4  F (36.3  C) (Oral) 99% 28.31 kg/m2         Blood Pressure from Last 3 Encounters:   08/22/18 127/69   07/09/18 124/56   05/30/18 175/77    Weight from Last 3 Encounters:   08/22/18 92.1 kg (203 lb)   07/09/18 92.1 kg (203 lb)   05/30/18 95.9 kg (211 lb 6.4 oz)              Today, you had the following     No orders found for display         Today's Medication Changes          These changes are accurate as of 8/22/18  3:15 PM.  If you have any questions, ask your nurse or doctor.               These medicines have changed or have updated prescriptions.        Dose/Directions    insulin detemir 100 UNIT/ML injection   Commonly known as:  LEVEMIR FLEXPEN/FLEXTOUCH   This may have changed:    - how much to take  - additional instructions   Used for:  Diabetes mellitus, type 2 (H)        Dose:  10 Units   Inject 10 Units Subcutaneous 2 times daily Dose was originally 50 units BID but pt has been cutting back   Refills:  0                Primary Care Provider Office Phone # Fax #    Celso Esparza MD  [phone]       [facility] [address]  [facility] [address]        Equal Access to Services     ENRRIQUE LACY : Hadii aad ku hadcyndy Sotelo, shayla sadadeloresha, aryan kasurendra uribe, awilda maggiein hayaan kikealvaro cid neema leigh. So [facility] [phone].    ATENCIÓN: Si habla español, tiene a schaefer disposición servicios gratuitos de asistencia lingüística. Llame al [phone].    We comply with applicable federal civil rights laws and [place] laws. We do not discriminate on the basis of race, color, national origin, age, disability, sex, sexual orientation, or gender identity.            Thank you!     Thank you for choosing [facility]  for your care. Our goal is always to provide you with excellent care. Hearing back from our patients is one way we can continue to improve our services. Please take a few minutes to complete the written survey that you may receive in the mail after your visit with us. Thank you!             Your Updated Medication List - Protect others around you: Learn how to safely use, store and throw away your medicines at www.disposemymeds.org.          This list is accurate as of 8/22/18  3:15 PM.  Always use your most recent med list.                   Brand Name Dispense Instructions for use Diagnosis    amphetamine-dextroamphetamine 10 MG per tablet    ADDERALL     Take 10 mg by mouth 3 times daily (before meals)        ARANESP (ALBUMIN FREE) 300 MCG/ML Soln   Generic drug:  darbepoetin aaliyah-polysorbate      Inject 300 mcg Subcutaneous every 14 days As needed for hgb <=11g/dL        ASPIRIN EC PO      Take 81 mg by mouth daily        atorvastatin 20 MG tablet    LIPITOR     Take 20 mg by mouth every evening 1700        B-D U/F 31G X 8 MM   Generic drug:  insulin pen needle      USE AS DIRECTED        blood glucose monitoring lancets      3x/day with blood glucose testing        ferrous sulfate 325 (65 Fe) MG tablet    IRON     Take 325  mg by mouth 2 times daily        FLONASE 50 MCG/ACT spray   Generic drug:  fluticasone      Spray 1 spray into both nostrils 2 times daily as needed        gabapentin 300 MG capsule    NEURONTIN     Take 300 mg by mouth daily He takes 300mg about every 3 days        11/11/15  Pt states he takes 300mg 2x/day        GLUCOSAMINE SULFATE PO      Take by mouth daily        insulin aspart 100 UNIT/ML injection    NovoLOG PEN     Inject subcutaneous 3 times daily with meals and bedtime?Medium Scale?less than 70 .................. Refer to Hypoglycemia?Treatment Protocol?70 - 149 ......................... No corrective insulin?150 - 199 ....................... 2 units?200 - 249 ....................... 4 units?250 - 299 ....................... 6 units?300 - 349 ....................... 8 units?350 or greater ................ 10 units and notify MD        insulin detemir 100 UNIT/ML injection    LEVEMIR FLEXPEN/FLEXTOUCH     Inject 10 Units Subcutaneous 2 times daily Dose was originally 50 units BID but pt has been cutting back    Diabetes mellitus, type 2 (H)       LASIX 40 MG tablet   Generic drug:  furosemide     90 tablet    Take 1 tablet (40 mg) by mouth 2 times daily    Essential hypertension, benign       loratadine 10 MG tablet    CLARITIN     Take 10 mg by mouth daily        losartan 100 MG tablet    COZAAR    90 tablet    TAKE 1 TABLET BY MOUTH DAILY    HTN (hypertension)       meclizine 25 MG tablet    ANTIVERT     Take 25 mg by mouth 2 times daily        metoprolol tartrate 25 MG tablet    LOPRESSOR    180 tablet    TAKE 1 TABLET(25 MG) BY MOUTH TWICE DAILY    HTN (hypertension)       minoxidil 2.5 MG tablet    LONITEN    180 tablet    TAKE 2 TABLETS BY MOUTH DAILY.    HTN (hypertension), CKD (chronic kidney disease) stage 4, GFR 15-29 ml/min (H)       MULTI-VITAMINS Tabs      Take 1 tablet by mouth daily        NUVIGIL PO      Take 200 mg by mouth every morning        omeprazole 20 MG CR capsule    priLOSEC      Take 20 mg by mouth daily        OSTEO BI-FLEX ADV TRIPLE ST PO      Take by mouth daily        sodium bicarbonate 650 MG tablet     120 tablet    Take 1 tablet (650 mg) by mouth 2 times daily    CKD (chronic kidney disease)       solifenacin 5 MG tablet    VESICARE    30 tablet    Take 1 tablet (5 mg) by mouth daily    Urge incontinence       UNABLE TO FIND      Take by mouth daily MEDICATION NAME: Brain Peak

## 2018-08-22 NOTE — LETTER
8/22/2018      RE: Austin Walker  [address] [facility] [address]       Nephrology Clinic Visit 8/22/18      Assessment and Plan:     1. Medication questions - Unclear what patient is actually taking. He does not bring in a list and is not sure of his doses.    - Will have him bring in his meds next visit to verify current meds    2. CKD4, non proteinuric - Creat 3.5 today, eGFR 17 ml/mn. Higher than his baseline. No retention found during Urology evaluation in July 2018. On vesicare. Unclear Lasix doses  Etiology of CKD is bx proven DM/HTN related CKD   - No uremic symptoms   - Has been relatively stable since 2016   - Would refer to [facility] if renal function begins to decline more rapidly   - Does not use NSAIDs   - On renal dosing for Vesicare    3. Volume status - Hypervolemia with mild edema, no dyspnea. Albumin nml. Weight 92.1 kg today. 's/ x 3. On unclear dose of Lasix.     - No changes in Lasix given unclear dosing    4. HTN - Well controlled. Clinic blood pressures today; 125-128/69-74 on the following: Home blood pressures in the 130/   Lasix ( unclear dose)   Losartan 100 mg every day   Lopressor 25 mg bid   Minoxidil 2.5 mg every day    - He will check blood pressures 2-3 times/week and bring in readings next visit    5. Electrolytes - No acute concerns. K 4.6    6. Acid base - No acute concerns. Bicarb 23   - Continue Bicarb 650 mg bid    7. BMD - Ca 8.8, Phos 3.9, albumin 3.9   - Check Vit D/PTH next visit   - Not on Ca/D supplements    8. Anemia - Hgb 10.5.    - Enrolled in Anemia management   - On iron bid   - No recent iron studies. Will check for next visit.    - Will check on last colonoscopy    9. Disposition - RTC one month for f/u. Patient to bring in all his medications.     Assessment and plan was discussed with patient and he voiced his understanding and agreement.    Reason for Visit:  CKD4 f/u    HPI:  Mr Walker is a 71 yo male with CKD4, HTN, DM2, MGUS,  previous CVA, in today for routine CKD f/u. Last seen by Dr Vyas on 5/30/18. Baseline creat in the upper 2-3 range since 8/16. He is non proteinuric.     Interval Hx:   No hospital admissions  Being followed by Urology for urinary frequency, incontinence. No retention.     ROS:  No complaints. Edema has improved from last visit. Denies dyspnea, CP, abdominal pain. Continues to report frequent urination. Appetite is OK but only eats one meal/day.     Chronic Health Problems:    CKD4  DM2  H/O CVA  HTN  MGUS  Anemia  PRINCE on CPAP  Urinary frequency/incontinence  HLD    Personal Hx:   Single, lives alone in own home. Sets up own meds. Does not drive, NS, ETOH none, uses walker    Allergies:  Allergies   Allergen Reactions     Lisinopril      Other reaction(s): Renal Failure     Armodafinil Rash       Medications:  Prior to Admission medications    Medication Sig Start Date End Date Taking? Authorizing Provider   amphetamine-dextroamphetamine (ADDERALL) 10 MG tablet Take 10 mg by mouth 3 times daily (before meals) 5/9/16   Reported, Patient   Armodafinil (NUVIGIL PO) Take 200 mg by mouth every morning    Reported, Patient   ASPIRIN EC PO Take 81 mg by mouth daily     Unknown, Entered By History   atorvastatin (LIPITOR) 20 MG tablet Take 20 mg by mouth every evening 1700 5/9/14   Reported, Patient   blood glucose monitoring (NOVA SUREFLEX) lancets 3x/day with blood glucose testing 6/3/08   Reported, Patient   darbepoetin aaliyah-polysorbate (ARANESP, ALBUMIN FREE,) 300 MCG/ML SOLN Inject 300 mcg Subcutaneous every 14 days As needed for hgb <=11g/dL    Reported, Patient   ferrous sulfate 325 (65 FE) MG tablet Take 325 mg by mouth 2 times daily  2/14/14   Reported, Patient   fluticasone (FLONASE) 50 MCG/ACT nasal spray Spray 1 spray into both nostrils 2 times daily as needed  6/24/14   Reported, Patient   furosemide (LASIX) 40 MG tablet Take 1 tablet (40 mg) by mouth 2 times daily 5/30/18   Basil Vyas MD    gabapentin (NEURONTIN) 300 MG capsule Take 300 mg by mouth daily He takes 300mg about every 3 days        11/11/15  Pt states he takes 300mg 2x/day    Reported, Patient   GLUCOSAMINE SULFATE PO Take by mouth daily     Reported, Patient   insulin aspart (NOVOLOG PEN) 100 UNIT/ML injection Inject subcutaneous 3 times daily with meals and bedtime Medium Scale less than 70 .................. Refer to Hypoglycemia Treatment Protocol 70 - 149 ......................... No corrective insulin 150 - 199 ....................... 2 units 200 - 249 ....................... 4 units 250 - 299 ....................... 6 units 300 - 349 ....................... 8 units 350 or greater ................ 10 units and notify MD   3/24/17   Reported, Patient   insulin detemir (LEVEMIR FLEXPEN/FLEXTOUCH) 100 UNIT/ML soln Inject 10 Units Subcutaneous 2 times daily Dose was originally 50 units BID but pt has been cutting back  Patient taking differently: Inject 30 Units Subcutaneous 2 times daily Dose was originally 50 units BID but pt has been cutting back 2/13/15   Ra Lehman MD   insulin pen needle (B-D U/F) 31G X 8 MM USE AS DIRECTED 1/9/17   Reported, Patient   loratadine (CLARITIN) 10 MG tablet Take 10 mg by mouth daily    Reported, Patient   losartan (COZAAR) 100 MG tablet TAKE 1 TABLET BY MOUTH DAILY 1/25/18   Basil Vyas MD   meclizine (ANTIVERT) 25 MG tablet Take 25 mg by mouth 2 times daily    Reported, Patient   metoprolol tartrate (LOPRESSOR) 25 MG tablet TAKE 1 TABLET(25 MG) BY MOUTH TWICE DAILY 4/25/18   Basil Vyas MD   minoxidil (LONITEN) 2.5 MG tablet TAKE 2 TABLETS BY MOUTH DAILY. 1/9/17   Basil Vyas MD   [facility] (OSTEO BI-FLEX ADV TRIPLE ST PO) Take by mouth daily    Reported, Patient   Multiple Vitamin (MULTI-VITAMINS) TABS Take 1 tablet by mouth daily  2/14/14   Reported, Patient   omeprazole (PRILOSEC) 20 MG capsule Take 20 mg by mouth daily  6/30/14   Reported, Patient    sodium bicarbonate 650 MG tablet Take 1 tablet (650 mg) by mouth 2 times daily 4/16/15   Shon Vyas MD   solifenacin (VESICARE) 5 MG tablet Take 1 tablet (5 mg) by mouth daily 4/9/18   Karlene Odom PA-C   UNABLE TO FIND Take by mouth daily MEDICATION NAME: Brain Peak    Reported, Patient       Vitals:  /69  Pulse 61  Temp 97.4  F (36.3  C) (Oral)  Wt 92.1 kg (203 lb)  SpO2 99%  BMI 28.31 kg/m2    Exam:  GEN: Elderly male in NAD.   CARDIAC: RRR  LUNGS: CTA  ABDOMEN: Soft, NT  EXT: 1+ edema    Results:  Results for NATALIE DE LA CRUZ (MRN [id number]) as of 8/23/2018 13:40   Ref. Range 8/22/2018 14:34   Sodium Latest Ref Range: 133 - 144 mmol/L 139   Potassium Latest Ref Range: 3.4 - 5.3 mmol/L 4.6   Chloride Latest Ref Range: 94 - 109 mmol/L 106   Carbon Dioxide Latest Ref Range: 20 - 32 mmol/L 23   Urea Nitrogen Latest Ref Range: 7 - 30 mg/dL 66 (H)   Creatinine Latest Ref Range: 0.66 - 1.25 mg/dL 3.50 (H)   GFR Estimate Latest Ref Range: >60 mL/min/1.7m2 17 (L)   GFR Estimate If Black Latest Ref Range: >60 mL/min/1.7m2 21 (L)   Calcium Latest Ref Range: 8.5 - 10.1 mg/dL 8.8   Anion Gap Latest Ref Range: 3 - 14 mmol/L 10   Phosphorus Latest Ref Range: 2.5 - 4.5 mg/dL 3.9   Albumin Latest Ref Range: 3.4 - 5.0 g/dL 3.9   Glucose Latest Ref Range: 70 - 99 mg/dL 227 (H)   WBC Latest Ref Range: 4.0 - 11.0 10e9/L 4.5   Hemoglobin Latest Ref Range: 13.3 - 17.7 g/dL 10.5 (L)   Hematocrit Latest Ref Range: 40.0 - 53.0 % 29.9 (L)   Platelet Count Latest Ref Range: 150 - 450 10e9/L 205   RBC Count Latest Ref Range: 4.4 - 5.9 10e12/L 3.15 (L)   MCV Latest Ref Range: 78 - 100 fl 95   MCH Latest Ref Range: 26.5 - 33.0 pg 33.3 (H)   MCHC Latest Ref Range: 31.5 - 36.5 g/dL 35.1   RDW Latest Ref Range: 10.0 - 15.0 % 13.7             Tiny Duff, NP

## 2018-08-22 NOTE — PATIENT INSTRUCTIONS
1. Bring in all medications next visit  2. Check blood pressure 2-3 times/week and bring in readings to next visit

## 2018-08-22 NOTE — NURSING NOTE
Chief Complaint   Patient presents with     RECHECK     3 month follow up cdk     /69  Pulse 61  Temp 97.4  F (36.3  C) (Oral)  Wt 92.1 kg (203 lb)  SpO2 99%  BMI 28.31 kg/m2   Tessa Merida

## 2018-08-23 NOTE — PROGRESS NOTES
Nephrology Clinic Visit 8/22/18      Assessment and Plan:     1. Medication questions - Unclear what patient is actually taking. He does not bring in a list and is not sure of his doses.    - Will have him bring in his meds next visit to verify current meds    2. CKD4, non proteinuric - Creat 3.5 today, eGFR 17 ml/mn. Higher than his baseline. No retention found during Urology evaluation in July 2018. On vesicare. Unclear Lasix doses  Etiology of CKD is bx proven DM/HTN related CKD   - No uremic symptoms   - Has been relatively stable since 2016   - Would refer to [facility] if renal function begins to decline more rapidly   - Does not use NSAIDs   - On renal dosing for Vesicare    3. Volume status - Hypervolemia with mild edema, no dyspnea. Albumin nml. Weight 92.1 kg today. 's/ x 3. On unclear dose of Lasix.     - No changes in Lasix given unclear dosing    4. HTN - Well controlled. Clinic blood pressures today; 125-128/69-74 on the following: Home blood pressures in the 130/   Lasix ( unclear dose)   Losartan 100 mg every day   Lopressor 25 mg bid   Minoxidil 2.5 mg every day    - He will check blood pressures 2-3 times/week and bring in readings next visit    5. Electrolytes - No acute concerns. K 4.6    6. Acid base - No acute concerns. Bicarb 23   - Continue Bicarb 650 mg bid    7. BMD - Ca 8.8, Phos 3.9, albumin 3.9   - Check Vit D/PTH next visit   - Not on Ca/D supplements    8. Anemia - Hgb 10.5.    - Enrolled in Anemia management   - On iron bid   - No recent iron studies. Will check for next visit.    - Will check on last colonoscopy    9. Disposition - RTC one month for f/u. Patient to bring in all his medications.     Assessment and plan was discussed with patient and he voiced his understanding and agreement.    Reason for Visit:  CKD4 f/u    HPI:  Mr Walker is a 71 yo male with CKD4, HTN, DM2, MGUS, previous CVA, in today for routine CKD f/u. Last seen by Dr Vyas on 5/30/18. Baseline creat  in the upper 2-3 range since 8/16. He is non proteinuric.     Interval Hx:   No hospital admissions  Being followed by Urology for urinary frequency, incontinence. No retention.     ROS:  No complaints. Edema has improved from last visit. Denies dyspnea, CP, abdominal pain. Continues to report frequent urination. Appetite is OK but only eats one meal/day.     Chronic Health Problems:    CKD4  DM2  H/O CVA  HTN  MGUS  Anemia  PRINCE on CPAP  Urinary frequency/incontinence  HLD    Personal Hx:   Single, lives alone in own home. Sets up own meds. Does not drive, NS, ETOH none, uses walker    Allergies:  Allergies   Allergen Reactions     Lisinopril      Other reaction(s): Renal Failure     Armodafinil Rash       Medications:  Prior to Admission medications    Medication Sig Start Date End Date Taking? Authorizing Provider   amphetamine-dextroamphetamine (ADDERALL) 10 MG tablet Take 10 mg by mouth 3 times daily (before meals) 5/9/16   Reported, Patient   Armodafinil (NUVIGIL PO) Take 200 mg by mouth every morning    Reported, Patient   ASPIRIN EC PO Take 81 mg by mouth daily     Unknown, Entered By History   atorvastatin (LIPITOR) 20 MG tablet Take 20 mg by mouth every evening 1700 5/9/14   Reported, Patient   blood glucose monitoring (NOVA SUREFLEX) lancets 3x/day with blood glucose testing 6/3/08   Reported, Patient   darbepoetin aaliyah-polysorbate (ARANESP, ALBUMIN FREE,) 300 MCG/ML SOLN Inject 300 mcg Subcutaneous every 14 days As needed for hgb <=11g/dL    Reported, Patient   ferrous sulfate 325 (65 FE) MG tablet Take 325 mg by mouth 2 times daily  2/14/14   Reported, Patient   fluticasone (FLONASE) 50 MCG/ACT nasal spray Spray 1 spray into both nostrils 2 times daily as needed  6/24/14   Reported, Patient   furosemide (LASIX) 40 MG tablet Take 1 tablet (40 mg) by mouth 2 times daily 5/30/18   Basil Vyas MD   gabapentin (NEURONTIN) 300 MG capsule Take 300 mg by mouth daily He takes 300mg about every 3 days         11/11/15  Pt states he takes 300mg 2x/day    Reported, Patient   GLUCOSAMINE SULFATE PO Take by mouth daily     Reported, Patient   insulin aspart (NOVOLOG PEN) 100 UNIT/ML injection Inject subcutaneous 3 times daily with meals and bedtime Medium Scale less than 70 .................. Refer to Hypoglycemia Treatment Protocol 70 - 149 ......................... No corrective insulin 150 - 199 ....................... 2 units 200 - 249 ....................... 4 units 250 - 299 ....................... 6 units 300 - 349 ....................... 8 units 350 or greater ................ 10 units and notify MD   3/24/17   Reported, Patient   insulin detemir (LEVEMIR FLEXPEN/FLEXTOUCH) 100 UNIT/ML soln Inject 10 Units Subcutaneous 2 times daily Dose was originally 50 units BID but pt has been cutting back  Patient taking differently: Inject 30 Units Subcutaneous 2 times daily Dose was originally 50 units BID but pt has been cutting back 2/13/15   Ra Lehman MD   insulin pen needle (B-D U/F) 31G X 8 MM USE AS DIRECTED 1/9/17   Reported, Patient   loratadine (CLARITIN) 10 MG tablet Take 10 mg by mouth daily    Reported, Patient   losartan (COZAAR) 100 MG tablet TAKE 1 TABLET BY MOUTH DAILY 1/25/18   Basil Vyas MD   meclizine (ANTIVERT) 25 MG tablet Take 25 mg by mouth 2 times daily    Reported, Patient   metoprolol tartrate (LOPRESSOR) 25 MG tablet TAKE 1 TABLET(25 MG) BY MOUTH TWICE DAILY 4/25/18   Basil Vyas MD   minoxidil (LONITEN) 2.5 MG tablet TAKE 2 TABLETS BY MOUTH DAILY. 1/9/17   Basil Vyas MD   Misc Natural Products (OSTEO BI-FLEX ADV TRIPLE ST PO) Take by mouth daily    Reported, Patient   Multiple Vitamin (MULTI-VITAMINS) TABS Take 1 tablet by mouth daily  2/14/14   Reported, Patient   omeprazole (PRILOSEC) 20 MG capsule Take 20 mg by mouth daily  6/30/14   Reported, Patient   sodium bicarbonate 650 MG tablet Take 1 tablet (650 mg) by mouth 2 times daily 4/16/15   Shon Vyas  MD [place]   solifenacin (VESICARE) 5 MG tablet Take 1 tablet (5 mg) by mouth daily 4/9/18   Karlene Odom PA-C   UNABLE TO FIND Take by mouth daily MEDICATION NAME: Brain Peak    Reported, Patient       Vitals:  /69  Pulse 61  Temp 97.4  F (36.3  C) (Oral)  Wt 92.1 kg (203 lb)  SpO2 99%  BMI 28.31 kg/m2    Exam:  GEN: Elderly male in NAD.   CARDIAC: RRR  LUNGS: CTA  ABDOMEN: Soft, NT  EXT: 1+ edema    Results:  Results for NATALIE DE LA CRUZ (MRN [id number]) as of 8/23/2018 13:40   Ref. Range 8/22/2018 14:34   Sodium Latest Ref Range: 133 - 144 mmol/L 139   Potassium Latest Ref Range: 3.4 - 5.3 mmol/L 4.6   Chloride Latest Ref Range: 94 - 109 mmol/L 106   Carbon Dioxide Latest Ref Range: 20 - 32 mmol/L 23   Urea Nitrogen Latest Ref Range: 7 - 30 mg/dL 66 (H)   Creatinine Latest Ref Range: 0.66 - 1.25 mg/dL 3.50 (H)   GFR Estimate Latest Ref Range: >60 mL/min/1.7m2 17 (L)   GFR Estimate If Black Latest Ref Range: >60 mL/min/1.7m2 21 (L)   Calcium Latest Ref Range: 8.5 - 10.1 mg/dL 8.8   Anion Gap Latest Ref Range: 3 - 14 mmol/L 10   Phosphorus Latest Ref Range: 2.5 - 4.5 mg/dL 3.9   Albumin Latest Ref Range: 3.4 - 5.0 g/dL 3.9   Glucose Latest Ref Range: 70 - 99 mg/dL 227 (H)   WBC Latest Ref Range: 4.0 - 11.0 10e9/L 4.5   Hemoglobin Latest Ref Range: 13.3 - 17.7 g/dL 10.5 (L)   Hematocrit Latest Ref Range: 40.0 - 53.0 % 29.9 (L)   Platelet Count Latest Ref Range: 150 - 450 10e9/L 205   RBC Count Latest Ref Range: 4.4 - 5.9 10e12/L 3.15 (L)   MCV Latest Ref Range: 78 - 100 fl 95   MCH Latest Ref Range: 26.5 - 33.0 pg 33.3 (H)   MCHC Latest Ref Range: 31.5 - 36.5 g/dL 35.1   RDW Latest Ref Range: 10.0 - 15.0 % 13.7

## 2018-09-10 ENCOUNTER — OFFICE VISIT (OUTPATIENT)
Dept: UROLOGY | Facility: CLINIC | Age: 73
End: 2018-09-10
Payer: COMMERCIAL

## 2018-09-10 VITALS
SYSTOLIC BLOOD PRESSURE: 163 MMHG | BODY MASS INDEX: 29.4 KG/M2 | HEIGHT: 71 IN | HEART RATE: 56 BPM | DIASTOLIC BLOOD PRESSURE: 75 MMHG | WEIGHT: 210 LBS

## 2018-09-10 DIAGNOSIS — R35.0 URINARY FREQUENCY: ICD-10-CM

## 2018-09-10 DIAGNOSIS — N39.41 URGE INCONTINENCE: Primary | ICD-10-CM

## 2018-09-10 RX ORDER — MIRABEGRON 25 MG/1
25 TABLET, FILM COATED, EXTENDED RELEASE ORAL DAILY
Qty: 30 TABLET | Refills: 11 | Status: SHIPPED | OUTPATIENT
Start: 2018-09-10 | End: 2019-02-22

## 2018-09-10 ASSESSMENT — PAIN SCALES - GENERAL: PAINLEVEL: NO PAIN (0)

## 2018-09-10 NOTE — MR AVS SNAPSHOT
After Visit Summary   9/10/2018    Austin Walker    MRN: [id number]           Patient Information     Date Of Birth          1945        Visit Information        Provider Department      9/10/2018 10:30 AM Karlene Odom PA-C [facility]rology and [facility]        Today's Diagnoses     Urge incontinence    -  1      Care Instructions    UROLOGY CLINIC VISIT PATIENT INSTRUCTIONS    1) Schedule urodynamics testing next available.    2) Start taking mirabegron (Myrbetriq) 25 mg once daily in addition to the Vesicare (solifenacin) medication.     URODYNAMIC TESTING      Where should I go for this test?  o The procedure is performed at:     [facility]   [address] [place] [address]   Floor 4    o If you have questions about your test, please call our nurse triage line at [phone], option #2. If you need to cancel or reschedule your test for any reason, please notify us as soon as possible.    o Please check in approximately 15 minutes prior to your procedure time.        What is urodynamic testing?   o Urodynamic testing refers to a group of tests used to assess bladder function by measuring various aspects of urine storage and emptying. The test takes about 75 minutes. For most patients, the test is not painful.       How should I get ready for this test?  o Please try to arrive with a comfortably full bladder if possible because you will be asked to try and urinate prior to your study.  o If you received a bladder diary, please complete this prior to your urodynamic test and bring it with you to your appointment. A bladder diary measures how much fluid you are drinking and how often and how much you are urinating. You can also record any urinary leakage that may have occurred and what you were doing when you leaked.    o If you have chronic constipation, please take stool softeners for two days  before your test.      What happens during the test?  o You will first be asked to empty your bladder in a private restroom into a special toilet called a uroflow machine which measures the rate of urine flow.  o A nurse will then place a very small tube (called a catheter) into your bladder. This drains any urine left over after urinating and also measures the pressures inside of your bladder during your test. Another small catheter will then be placed into your rectum to measure abdominal pressures. Two small sticky patches will be placed on the skin near your anus to measure pelvic floor function.   o We will then instill contrast dye into your bladder through the bladder catheter. The contrast is very dense and will allow us to take x-ray pictures of your bladder intermittently during your test.  o You will be asked to tell us when you first start to feel like your bladder is filling up, when you have moderate urgency to urinate, when you have very strong urgency to urinate and finally when you feel that your bladder is full. Once you feel full you will be asked to try and urinate.    o You may be asked to cough or bear down several times during your procedure. The provider running your study will be looking for urine leakage during this time.        What happens after the test?  o The provider running your urodynamic study will share the results of your test on the day of your procedure or very soon after.  o After your test, you may go about your day as normal. You may notice some blood in your urine for a couple of days which should clear up on its own. You may also feel a more urgent need to use the toilet or you may need to go more often - this is due to having a catheter placed and should resolve on its own in a few days.      If you have any issues, questions or concerns in the meantime, do not hesitate to contact us at [phone] or via [employer].     It was a pleasure meeting with you today.  Thank you  for allowing me and my team the privilege of caring for you today.  YOU are the reason we are here, and I truly hope we provided you with the excellent service you deserve.  Please let us know if there is anything else we can do for you so that we can be sure you are leaving completely satisfied with your care experience.                Follow-ups after your visit        Your next 10 appointments already scheduled     Sep 25, 2018  1:30 PM CDT   Lab with  LAB   [facility] ([facility])    [address]  1st Floor  [facility] [address] [phone]            Sep 25, 2018  2:30 PM CDT   (Arrive by 2:00 PM)   Return Visit with Tiny Duff NP   [facility] Nephrology ([facility])    [address]  [address]  [facility] [address] [phone]              Who to contact     Please call your clinic at [phone] to:    Ask questions about your health    Make or cancel appointments    Discuss your medicines    Learn about your test results    Speak to your doctor            Additional Information About Your Visit        [employer] Information     [employer] gives you secure access to your electronic health record. If you see a primary care provider, you can also send messages to your care team and make appointments. If you have questions, please call your primary care clinic.  If you do not have a primary care provider, please call [phone] and they will assist you.      [employer] is an electronic gateway that provides easy, online access to your medical records. With [employer], you can request a clinic appointment, read your test results, renew a prescription or communicate with your care team.     To access your existing account, please contact your [facility] or call [phone] for assistance.        Care EveryWhere ID     This is your Care EveryWhere ID. This could be used by other organizations to  "access your [place] medical records  [id number]        Your Vitals Were     Pulse Height BMI (Body Mass Index)             56 1.803 m (5' 11\") 29.29 kg/m2          Blood Pressure from Last 3 Encounters:   09/10/18 163/75   08/22/18 127/69   07/09/18 124/56    Weight from Last 3 Encounters:   09/10/18 95.3 kg (210 lb)   08/22/18 92.1 kg (203 lb)   07/09/18 92.1 kg (203 lb)              We Performed the Following     POST-VOID RESIDUAL BLADDER SCAN          Today's Medication Changes          These changes are accurate as of 9/10/18 11:05 AM.  If you have any questions, ask your nurse or doctor.               Start taking these medicines.        Dose/Directions    mirabegron 25 MG 24 hr tablet   Commonly known as:  MYRBETRIQ   Used for:  Urge incontinence   Started by:  Karlene Odom PA-C        Dose:  25 mg   Take 1 tablet (25 mg) by mouth daily   Quantity:  30 tablet   Refills:  11         These medicines have changed or have updated prescriptions.        Dose/Directions    insulin detemir 100 UNIT/ML injection   Commonly known as:  LEVEMIR FLEXPEN/FLEXTOUCH   This may have changed:    - how much to take  - additional instructions   Used for:  Diabetes mellitus, type 2 (H)        Dose:  10 Units   Inject 10 Units Subcutaneous 2 times daily Dose was originally 50 units BID but pt has been cutting back   Refills:  0            Where to get your medicines      [id number] [place] [facility] [address] [place] [address]  [address], [facility] [address]     Phone:  [phone]     mirabegron 25 MG 24 hr tablet                Primary Care Provider Office Phone # Fax #    Celso Esparza [phone]       [facility] [address] [facility] [address]  [facility] [address]        Equal Access to Services     ENRRIQUE LACY AH: Hadii lili wooten Socarl, waaxda luqadaha, qaybta kaalleonides uribe, " awilda lopezbrandi liu'aan ah. So [facility] [phone].    ATENCIÓN: Si habla erica, tiene a schaefer disposición servicios gratuitos de asistencia lingüística. Edgardo caro [phone].    We comply with applicable federal civil rights laws and [place] laws. We do not discriminate on the basis of race, color, national origin, age, disability, sex, sexual orientation, or gender identity.            Thank you!     Thank you for choosing [facility] UROLOGY AND [facility]IC [facility]CERS  for your care. Our goal is always to provide you with excellent care. Hearing back from our patients is one way we can continue to improve our services. Please take a few minutes to complete the written survey that you may receive in the mail after your visit with us. Thank you!             Your Updated Medication List - Protect others around you: Learn how to safely use, store and throw away your medicines at www.disposemymeds.org.          This list is accurate as of 9/10/18 11:05 AM.  Always use your most recent med list.                   Brand Name Dispense Instructions for use Diagnosis    amphetamine-dextroamphetamine 10 MG per tablet    ADDERALL     Take 10 mg by mouth 3 times daily (before meals)        ARANESP (ALBUMIN FREE) 300 MCG/ML Soln   Generic drug:  darbepoetin aaliyah-polysorbate      Inject 300 mcg Subcutaneous every 14 days As needed for hgb <=11g/dL        ASPIRIN EC PO      Take 81 mg by mouth daily        atorvastatin 20 MG tablet    LIPITOR     Take 20 mg by mouth every evening 1700        B-D U/F 31G X 8 MM   Generic drug:  insulin pen needle      USE AS DIRECTED        blood glucose monitoring lancets      3x/day with blood glucose testing        ferrous sulfate 325 (65 Fe) MG tablet    IRON     Take 325 mg by mouth 2 times daily        FLONASE 50 MCG/ACT spray   Generic drug:  fluticasone      Spray 1 spray into both nostrils 2 times daily as needed        gabapentin 300 MG capsule    NEURONTIN      Take 300 mg by mouth daily He takes 300mg about every 3 days        11/11/15  Pt states he takes 300mg 2x/day        GLUCOSAMINE SULFATE PO      Take by mouth daily        insulin aspart 100 UNIT/ML injection    NovoLOG PEN     Inject subcutaneous 3 times daily with meals and bedtime?Medium Scale?less than 70 .................. Refer to Hypoglycemia?Treatment Protocol?70 - 149 ......................... No corrective insulin?150 - 199 ....................... 2 units?200 - 249 ....................... 4 units?250 - 299 ....................... 6 units?300 - 349 ....................... 8 units?350 or greater ................ 10 units and notify MD        insulin detemir 100 UNIT/ML injection    LEVEMIR FLEXPEN/FLEXTOUCH     Inject 10 Units Subcutaneous 2 times daily Dose was originally 50 units BID but pt has been cutting back    Diabetes mellitus, type 2 (H)       LASIX 40 MG tablet   Generic drug:  furosemide     90 tablet    Take 1 tablet (40 mg) by mouth 2 times daily    Essential hypertension, benign       loratadine 10 MG tablet    CLARITIN     Take 10 mg by mouth daily        losartan 100 MG tablet    COZAAR    90 tablet    TAKE 1 TABLET BY MOUTH DAILY    HTN (hypertension)       meclizine 25 MG tablet    ANTIVERT     Take 25 mg by mouth 2 times daily        metoprolol tartrate 25 MG tablet    LOPRESSOR    180 tablet    TAKE 1 TABLET(25 MG) BY MOUTH TWICE DAILY    HTN (hypertension)       minoxidil 2.5 MG tablet    LONITEN    180 tablet    TAKE 2 TABLETS BY MOUTH DAILY.    HTN (hypertension), CKD (chronic kidney disease) stage 4, GFR 15-29 ml/min (H)       mirabegron 25 MG 24 hr tablet    MYRBETRIQ    30 tablet    Take 1 tablet (25 mg) by mouth daily    Urge incontinence       MULTI-VITAMINS Tabs      Take 1 tablet by mouth daily        NUVIGIL PO      Take 200 mg by mouth every morning        omeprazole 20 MG CR capsule    priLOSEC     Take 20 mg by mouth daily        OSTEO BI-FLEX ADV TRIPLE ST PO      Take  by mouth daily        sodium bicarbonate 650 MG tablet     120 tablet    Take 1 tablet (650 mg) by mouth 2 times daily    CKD (chronic kidney disease)       solifenacin 5 MG tablet    VESICARE    30 tablet    Take 1 tablet (5 mg) by mouth daily    Urge incontinence       UNABLE TO FIND      Take by mouth daily MEDICATION NAME: Brain Peak

## 2018-09-10 NOTE — PROGRESS NOTES
"UROLOGY OFFICE VISIT - FOLLOW UP    REASON FOR VISIT: follow up urinary frequency, urgency, incontinence    HPI: Mr. Austin Walker is a 73 year old male with a PMH of CKD stage 4 with proteinuria 2/2 DM and HTN (awaiting transplant listing, not currently on dialysis), MGUS (follows with [facility]), PRINCE, and h/o CVA who returns to the urology clinic for follow up of urinary frequency, urgency, and incontinence. Seen in initial consultation on 2/12/18 - please see consult note from this date for full history. In brief, he has had bothersome urinary symptoms for the past 3 years, progressively worsening. Incontinence started after he began taking Lasix (80 mg in the AM, 60 mg in the PM, ~2pm). He has multiple episodes of incontinence per day - goes through 4-6 Depends briefs per day. Leakage mainly occurs during the day, rarely overnight. Gets up 0-1 times per night to void. Will leak when he goes from supine or sitting to standing. States that urine just \"pours out.\" Also reports significant urgency and urge incontinence. No h/o UTI's or kidney stones. Negative UA through primary care office in January 2018. No hematuria, dysuria, needing to strain to void. Subjectively, force of stream is \"very strong.\" Occasional sensation of incomplete emptying but PVR at last office visit was 11 mL. Normally sits to void. Drinks 1 pot of coffee per day, 2 glasses of diet ginger ale, 1 glass of OJ in the AM. Initially, he was recommended to limit bladder irritants and complete bladder diaries. At a follow up appointment on 4/9/18, he reported decreasing coffee consumption from 1 pot per day to 1 cup per day. Despite this, he had not noticed any improvement in urinary symptoms. In fact, he thought things may be getting worse with more frequent nighttime incontinence now than ever before. He was therefore started on Vesicare 5 mg daily and asked to complete another bladder diary. At a follow up appointment, he reported \"night " "and day difference\" in his symptom with less urgency and less incontinence. He does continue to have some incontinence, but it is much less volume then previously. He wonders about increasing the dose as the effects seem to wear off toward the end of the day. Dose increase was initially attempted but is contraindicated given CrCl <30. He therefore continued on Vesicare 5 mg once daily.     Returns today for follow up and reports that his symptoms are getting worse. He is now voiding more frequently, has worsening urgency, and is having more incontinence. Also with nocturia x 3-4. Continues to deny dysuria, hematuria, sense of incomplete bladder emptying.     Bladder Diary (PRIOR TO VESICARE):  Voids q30-90 minutes during the day, nocturia x 0-1.   Episodes of incontinence: multiple per day, goes through 4-6 Depends per day.  Incontinence associated with: sitting to standing, strong urge  Volume Intake: 1 cup of coffee, 2-4 glasses of water, 1 glass of OJ  Voided Volume: 1890 mL per day; average voided volume: 150 mL; largest voided volume: 280 mL    Bladder Diary (AFTER STARTING VESICARE):  Voids q2 hours during the day, nocturia x 0-1.   Episodes of incontinence: 3 per day, goes through 4 Depends per day.  Incontinence associated with: strong urge  Volume Intake: 5 cups of coffee, 1 glass of OJ  Voided Volume: 1800 mL per day; average voided volume: 225 mL; largest voided volume: 295 mL    PEx  /75  Pulse 56  Ht 1.803 m (5' 11\")  Wt 95.3 kg (210 lb)  BMI 29.29 kg/m2  GEN: well-appearing male in NAD  RESP: no increased respiratory effort    PVR today: 27 mL as measured by ultrasound    PSA   Date Value Ref Range Status   12/20/2017 0.25 0 - 4 ug/L Final     Comment:     Assay Method:  Chemiluminescence using Siemens Vista analyzer       Creatinine   Date Value Ref Range Status   08/22/2018 3.50 (H) 0.66 - 1.25 mg/dL Final       A/P  73 year old male with urinary frequency, urgency, and urge incontinence. " Symptoms started after he began taking lasix 3 years ago - takes 80 mg qAM and 60 mg qPM (around 2pm). Also drinks a pot of coffee + juice + 2 cans of diet ginger ale per day with minimal water. Recent UA was negative, making infection unlikely. Uroflow at last office visit showed decreased Qmax (16.6 ml/s) but good bladder emptying (PVR 11 mL). Initial voiding diaries (prior to Vesicare) revealed frequent voids (q30-90 minutes) during the day and voiding in small volumes (100-280 mL per void). He has been working on limiting bladder irritants (now drinks 1 cup coffee/day) without significant improvement in frequency, urgency, incontinence. Suspect that diuretics are largely contributing to symptoms, but may have a component of OAB as well. He was started on low dose Vesicare 5 mg daily which worked well for a period of time but symptoms are now worsening. Unable to increase dose of Vesicare due to CrCl <30. He continues to empty well (PVR today 27 mL). We therefore discussed and will plan for the following:  -Continue Vesicare 5 mg daily.  -Add mirabegron 25 mg once daily. Side effects discussed. Patient will monitor home BP's.   -Schedule for urodynamics next available to further evaluate.  -Continue to limit bladder irritants.     Additional recommendations pending results of UDS.    15 minutes spent with the patient, >50% in counseling and coordination of care.    Karlene Odom PA-C  Urology

## 2018-09-10 NOTE — LETTER
"9/10/2018     RE: Austin Walker  [address] [facility] [address]     Dear Colleague,    Thank you for referring your patient, Austin Walker, to the [facility] UROLOGY AND INST FOR PROSTATE AND UROLOGIC CANCERS at [facility]. Please see a copy of my visit note below.    UROLOGY OFFICE VISIT - FOLLOW UP    REASON FOR VISIT: follow up urinary frequency, urgency, incontinence    HPI: Mr. Austin Walker is a 73 year old male with a PMH of CKD stage 4 with proteinuria 2/2 DM and HTN (awaiting transplant listing, not currently on dialysis), MGUS (follows with [facility]), PRINCE, and h/o CVA who returns to the urology clinic for follow up of urinary frequency, urgency, and incontinence. Seen in initial consultation on 2/12/18 - please see consult note from this date for full history. In brief, he has had bothersome urinary symptoms for the past 3 years, progressively worsening. Incontinence started after he began taking Lasix (80 mg in the AM, 60 mg in the PM, ~2pm). He has multiple episodes of incontinence per day - goes through 4-6 Depends briefs per day. Leakage mainly occurs during the day, rarely overnight. Gets up 0-1 times per night to void. Will leak when he goes from supine or sitting to standing. States that urine just \"pours out.\" Also reports significant urgency and urge incontinence. No h/o UTI's or kidney stones. Negative UA through primary care office in January 2018. No hematuria, dysuria, needing to strain to void. Subjectively, force of stream is \"very strong.\" Occasional sensation of incomplete emptying but PVR at last office visit was 11 mL. Normally sits to void. Drinks 1 pot of coffee per day, 2 glasses of diet ginger ale, 1 glass of OJ in the AM. Initially, he was recommended to limit bladder irritants and complete bladder diaries. At a follow up appointment on 4/9/18, he reported decreasing coffee consumption from 1 pot per day to 1 cup per day. " "Despite this, he had not noticed any improvement in urinary symptoms. In fact, he thought things may be getting worse with more frequent nighttime incontinence now than ever before. He was therefore started on Vesicare 5 mg daily and asked to complete another bladder diary. At a follow up appointment, he reported \"night and day difference\" in his symptom with less urgency and less incontinence. He does continue to have some incontinence, but it is much less volume then previously. He wonders about increasing the dose as the effects seem to wear off toward the end of the day. Dose increase was initially attempted but is contraindicated given CrCl <30. He therefore continued on Vesicare 5 mg once daily.     Returns today for follow up and reports that his symptoms are getting worse. He is now voiding more frequently, has worsening urgency, and is having more incontinence. Also with nocturia x 3-4. Continues to deny dysuria, hematuria, sense of incomplete bladder emptying.     Bladder Diary (PRIOR TO VESICARE):  Voids q30-90 minutes during the day, nocturia x 0-1.   Episodes of incontinence: multiple per day, goes through 4-6 Depends per day.  Incontinence associated with: sitting to standing, strong urge  Volume Intake: 1 cup of coffee, 2-4 glasses of water, 1 glass of OJ  Voided Volume: 1890 mL per day; average voided volume: 150 mL; largest voided volume: 280 mL    Bladder Diary (AFTER STARTING VESICARE):  Voids q2 hours during the day, nocturia x 0-1.   Episodes of incontinence: 3 per day, goes through 4 Depends per day.  Incontinence associated with: strong urge  Volume Intake: 5 cups of coffee, 1 glass of OJ  Voided Volume: 1800 mL per day; average voided volume: 225 mL; largest voided volume: 295 mL    PEx  /75  Pulse 56  Ht 1.803 m (5' 11\")  Wt 95.3 kg (210 lb)  BMI 29.29 kg/m2  GEN: well-appearing male in NAD  RESP: no increased respiratory effort    PVR today: 27 mL as measured by ultrasound    PSA "   Date Value Ref Range Status   12/20/2017 0.25 0 - 4 ug/L Final     Comment:     Assay Method:  Chemiluminescence using Siemens Vista analyzer       Creatinine   Date Value Ref Range Status   08/22/2018 3.50 (H) 0.66 - 1.25 mg/dL Final       A/P  73 year old male with urinary frequency, urgency, and urge incontinence. Symptoms started after he began taking lasix 3 years ago - takes 80 mg qAM and 60 mg qPM (around 2pm). Also drinks a pot of coffee + juice + 2 cans of diet ginger ale per day with minimal water. Recent UA was negative, making infection unlikely. Uroflow at last office visit showed decreased Qmax (16.6 ml/s) but good bladder emptying (PVR 11 mL). Initial voiding diaries (prior to Vesicare) revealed frequent voids (q30-90 minutes) during the day and voiding in small volumes (100-280 mL per void). He has been working on limiting bladder irritants (now drinks 1 cup coffee/day) without significant improvement in frequency, urgency, incontinence. Suspect that diuretics are largely contributing to symptoms, but may have a component of OAB as well. He was started on low dose Vesicare 5 mg daily which worked well for a period of time but symptoms are now worsening. Unable to increase dose of Vesicare due to CrCl <30. He continues to empty well (PVR today 27 mL). We therefore discussed and will plan for the following:  -Continue Vesicare 5 mg daily.  -Add mirabegron 25 mg once daily. Side effects discussed. Patient will monitor home BP's.   -Schedule for urodynamics next available to further evaluate.  -Continue to limit bladder irritants.     Additional recommendations pending results of UDS.    15 minutes spent with the patient, >50% in counseling and coordination of care.    Karlene Odom PA-C  Urology

## 2018-09-10 NOTE — PATIENT INSTRUCTIONS
UROLOGY CLINIC VISIT PATIENT INSTRUCTIONS    1) Schedule urodynamics testing next available.    2) Start taking mirabegron (Myrbetriq) 25 mg once daily in addition to the Vesicare (solifenacin) medication.     URODYNAMIC TESTING      Where should I go for this test?  o The procedure is performed at:     [facility]   [address] [place] [id number]   Floor 4    o If you have questions about your test, please call our nurse triage line at [phone], option #2. If you need to cancel or reschedule your test for any reason, please notify us as soon as possible.    o Please check in approximately 15 minutes prior to your procedure time.        What is urodynamic testing?   o Urodynamic testing refers to a group of tests used to assess bladder function by measuring various aspects of urine storage and emptying. The test takes about 75 minutes. For most patients, the test is not painful.       How should I get ready for this test?  o Please try to arrive with a comfortably full bladder if possible because you will be asked to try and urinate prior to your study.  o If you received a bladder diary, please complete this prior to your urodynamic test and bring it with you to your appointment. A bladder diary measures how much fluid you are drinking and how often and how much you are urinating. You can also record any urinary leakage that may have occurred and what you were doing when you leaked.    o If you have chronic constipation, please take stool softeners for two days before your test.      What happens during the test?  o You will first be asked to empty your bladder in a private restroom into a special toilet called a uroflow machine which measures the rate of urine flow.  o A nurse will then place a very small tube (called a catheter) into your bladder. This drains any urine left over after urinating and also measures the pressures inside of your  bladder during your test. Another small catheter will then be placed into your rectum to measure abdominal pressures. Two small sticky patches will be placed on the skin near your anus to measure pelvic floor function.   o We will then instill contrast dye into your bladder through the bladder catheter. The contrast is very dense and will allow us to take x-ray pictures of your bladder intermittently during your test.  o You will be asked to tell us when you first start to feel like your bladder is filling up, when you have moderate urgency to urinate, when you have very strong urgency to urinate and finally when you feel that your bladder is full. Once you feel full you will be asked to try and urinate.    o You may be asked to cough or bear down several times during your procedure. The provider running your study will be looking for urine leakage during this time.        What happens after the test?  o The provider running your urodynamic study will share the results of your test on the day of your procedure or very soon after.  o After your test, you may go about your day as normal. You may notice some blood in your urine for a couple of days which should clear up on its own. You may also feel a more urgent need to use the toilet or you may need to go more often - this is due to having a catheter placed and should resolve on its own in a few days.      If you have any issues, questions or concerns in the meantime, do not hesitate to contact us at [phone] or via [employer].     It was a pleasure meeting with you today.  Thank you for allowing me and my team the privilege of caring for you today.  YOU are the reason we are here, and I truly hope we provided you with the excellent service you deserve.  Please let us know if there is anything else we can do for you so that we can be sure you are leaving completely satisfied with your care experience.

## 2018-09-11 ENCOUNTER — PRE VISIT (OUTPATIENT)
Dept: UROLOGY | Facility: CLINIC | Age: 73
End: 2018-09-11

## 2018-09-12 ENCOUNTER — RADIANT APPOINTMENT (OUTPATIENT)
Dept: RADIOLOGY | Facility: AMBULATORY SURGERY CENTER | Age: 73
End: 2018-09-12
Attending: PHYSICIAN ASSISTANT
Payer: COMMERCIAL

## 2018-09-12 ENCOUNTER — OFFICE VISIT (OUTPATIENT)
Dept: UROLOGY | Facility: CLINIC | Age: 73
End: 2018-09-12
Payer: COMMERCIAL

## 2018-09-12 VITALS
HEART RATE: 64 BPM | SYSTOLIC BLOOD PRESSURE: 146 MMHG | HEIGHT: 71 IN | WEIGHT: 222 LBS | BODY MASS INDEX: 31.08 KG/M2 | DIASTOLIC BLOOD PRESSURE: 63 MMHG

## 2018-09-12 DIAGNOSIS — R32 URINARY INCONTINENCE, UNSPECIFIED TYPE: ICD-10-CM

## 2018-09-12 DIAGNOSIS — N39.41 URGE INCONTINENCE: Primary | ICD-10-CM

## 2018-09-12 DIAGNOSIS — R35.0 URINARY FREQUENCY: ICD-10-CM

## 2018-09-12 LAB
APPEARANCE UR: CLEAR
BILIRUB UR QL: NORMAL
COLOR UR: YELLOW
GLUCOSE URINE: 250 MG/DL
HGB UR QL: NORMAL
KETONES UR QL: NORMAL MG/DL
LEUKOCYTE ESTERASE URINE: NORMAL
NITRITE UR QL STRIP: NORMAL
PH UR STRIP: 5.5 PH (ref 5–7)
PROTEIN ALBUMIN URINE: 300 MG/DL
SOURCE: NORMAL
SP GR UR STRIP: 1.02 (ref 1–1.03)
UROBILINOGEN UR QL STRIP: 0.2 EU/DL (ref 0.2–1)

## 2018-09-12 ASSESSMENT — PAIN SCALES - GENERAL: PAINLEVEL: NO PAIN (0)

## 2018-09-12 NOTE — PATIENT INSTRUCTIONS
UROLOGY CLINIC VISIT PATIENT INSTRUCTIONS    Follow up with me in 1 month to recheck your urinary symptoms.    -Continue taking vesicare (solifenacin) 5 mg once daily.  -Continue taking mirabegron (Myrbetriq) 25 mg once daily.  -Use the handheld urinal at night and in emergency cases during the day when you cannot make it to the bathroom in time.    If you have any issues, questions or concerns in the meantime, do not hesitate to contact us at [phone] or via [employer].     It was a pleasure meeting with you today.  Thank you for allowing me and my team the privilege of caring for you today.  YOU are the reason we are here, and I truly hope we provided you with the excellent service you deserve.  Please let us know if there is anything else we can do for you so that we can be sure you are leaving completely satisfied with your care experience.

## 2018-09-12 NOTE — LETTER
9/12/2018       RE: Austin Walker  [address] [place] [id number]     Dear Colleague,    Thank you for referring your patient, Austin Walker, to the [facility] FOR PROSTATE AND UROLOGIC CANCERS at [facility]. Please see a copy of my visit note below.    PREPROCEDURE DIAGNOSES:    1. Urinary urgency, frequency, nocturia  2. Urge urinary incontinence     POSTPROCEDURE DIAGNOSES:  -Small bladder capacity (~270 mL) with normal filling sensations.   -Fair/poor bladder compliance (ratio 7.9) with quite a bit of detrusor overactivity throughout filling (max Pdet reaches ~30 cm H2O) but no incontinence (DO or stress-related).  -Strong detrusor contraction during voiding to 58.6 cm H2O with a slow flow (Qmax recorded as 1.7 mL/sec but this is artificially low as his urine stream was disrupted by the sheet underneath patient, so the flow was trickling off the sheet into the uroflow).  -There is also some incomplete bladder emptying (voided 100 mL; final  mL).   -Given these findings (high pressure/low flow), there is some evidence for possible bladder outlet obstruction which is further supported by BOOI of 48.9.  -No evidence for DSD as EMG tracings remain quiet during voiding.  -Fluoroscopy reveals a smooth-walled bladder without diverticuli or VUR. Bladder neck is closed during filling and open during voiding with possible area of narrowing in the mid-proximal urethra.    PROCEDURE:    1. Sterile urethral catheterization for measurement of postvoid residual urine volume.  2. Complex filling cystometrogram with measurement of bladder and rectal pressures.  3. Complex voiding cystometrogram with measurement of bladder and rectal pressures.  4. Electromyography of the pelvic floor during urodynamics.  5. Fluoroscopic imaging of the bladder during urodynamics, at least 3 views.    6. Interpretation of urodynamics and flouroscopic imaging.       INDICATIONS FOR PROCEDURE:  Mr. Austin Walker is a pleasant 73 year old male with urinary frequency, urgency, nocturia, and urge incontinence. Symptoms began after he started taking Lasix 3 years ago (takes 80 mg qAM and 60 mg around 2pm). Initial voiding diaries showed frequent voids (q30-90 minutes) during the day and voiding in small volumes (100-280 mL per void). He attempted lifestyle modifications including limiting bladder irritants and timed/double voiding, but this did not help and so he was initiated on Vesicare 5 mg daily. This helped initially but was then less helpful. Therefore, Myrbetriq 25 mg once daily was added (only 3 days ago) and he presents today for baseline video urodynamic assessment to better characterize his voiding dysfunction. He states that he has already noticed some improvement over the last 2-3 days since starting the Myrbetriq. Nocturia is down to 1-2 but he is still having urge incontinence during the day.    VOIDING DIARY:  Bladder Diary (PRIOR TO VESICARE):  Voids q30-90 minutes during the day, nocturia x 0-1.   Episodes of incontinence: multiple per day, goes through 4-6 Depends per day.  Incontinence associated with: sitting to standing, strong urge  Volume Intake: 1 cup of coffee, 2-4 glasses of water, 1 glass of OJ  Voided Volume: 1890 mL per day; average voided volume: 150 mL; largest voided volume: 280 mL     Bladder Diary (AFTER STARTING VESICARE):  Voids q2 hours during the day, nocturia x 0-1.   Episodes of incontinence: 3 per day, goes through 4 Depends per day.  Incontinence associated with: strong urge  Volume Intake: 5 cups of coffee, 1 glass of OJ  Voided Volume: 1800 mL per day; average voided volume: 225 mL; largest voided volume: 295 mL    DESCRIPTION OF PROCEDURE:  Risks, benefits, and alternatives to urodynamics were discussed with the patient and he wished to proceed.  Urodynamics are planned to better assess the primary etiology for Mr. Walker's  urologic dysfunction.  The patient last took anticholinergic medication 2 hours ago.  After informed consent was obtained, the patient was taken to the procedure room where uroflowmetry was performed. Findings below.     PRE-STUDY UROFLOWMETRY:  Not performed as patient had just voided and had no urge to void again.  Postvoid residual by catheter: 80 mL.  Pretest urine dipstick was negative for leukocytes and nitrites.    Next a 7F double-lumen urodynamics catheter was inserted into the bladder under sterile technique via native urethra.  A 7F abdominal manometry catheter was placed in the rectum.  EMG pads were placed on both sides of the anal verge.  The bladder was filled with 200 mL of Cystografin at 25 mL/minute and serial pressures were recorded.  With coughing there was an appropriate rise in vesical and abdominal pressures with no change in detrusor pressure, confirming good study catheter placement.    DURING THE FILLING PHASE:  First sensation: 175 mL.  First Desire: 178 mL.  Strong Desire: 211 mL.  Maximum Capacity: 268 mL.    Uninhibited detrusor contractions: there is quite a bit of detrusor overactivity throughout filling but he never leaks.  Compliance: fair/poor. PDet=24 cmH20 at capacity. Compliance ratio of 7.9.  Continence: no DOI or DINESH  EMG: concordant during filling.    DURING THE VOIDING PHASE:  Maximum detrusor contraction with void: 58.6 cm of H2O pressure.  Voided volume: ~100 mL (estimate that 45-50 mL soaked into the sheet underneath the patient rather than collecting in the uroflow container which only recorded 48.7 mL).  Maximum flow rate: 1.7 mL/sec (this is artificially low as his urine stream was disrupted by the sheet underneath patient, so the flow was trickling off the sheet into the uroflow).  Average flow rate: 1.2 mL/sec.  Postvoid Residual: 175 mL.  EMG activity: remains quiet during voiding, making DSD unlikely  Character of voiding curve: low amplitude.  BOOI: 48.9  (suggesting possible obstruction - see key below)  [obstructed (WREN index [BOOI] ? 40); equivocal (no definite   obstruction; BOOI 20-40); and no obstruction (BOOI ? 20)]    FLUOROSCOPIC IMAGING OF THE BLADDER DURING URODYNAMICS:  Fluoroscopy during today's procedure demonstrated a smooth walled bladder without diverticulae or cellules.  No vesicoureteral reflux was observed.  The bladder neck was closed during filling and open during voiding with possible area of narrowing in the mid-proximal urethra.  After voiding to sense of completion, all catheters were removed, the patient was straight catheterized for residual volume, and was brought back into the consultation room to further discuss today's study results.      ASSESSMENT/PLAN:  Mr. Austin Walker is a pleasant 73 year old male with urinary frequency, urgency, and urge incontinence who demonstrated the following findings today on urodynamic evaluation:    -Small bladder capacity (~270 mL) with normal filling sensations.   -Fair/poor bladder compliance (ratio 7.9) with quite a bit of detrusor overactivity throughout filling (max Pdet reaches ~30 cm H2O) but no incontinence (DO or stress-related).  -Strong detrusor contraction during voiding to 58.6 cm H2O with a slow flow (Qmax recorded as 1.7 mL/sec but this is artificially low as his urine stream was disrupted by the sheet underneath patient, so the flow was trickling off the sheet into the uroflow).  -There is also some incomplete bladder emptying (voided 100 mL; final  mL).   -Given these findings (high pressure/low flow), there is some evidence for possible bladder outlet obstruction which is further supported by BOOI of 48.9.  -No evidence for DSD as EMG tracings remain quiet during voiding.  -Fluoroscopy reveals a smooth-walled bladder without diverticuli or VUR. Bladder neck is closed during filling and open during voiding with possible area of narrowing in the mid-proximal urethra.    We  discussed his study results in detail. In summary, he has findings of possible bladder outlet obstruction as well as detrusor instability despite dual therapy with Vesicare and Myrbetriq. We discussed potential management options including continuing on OAB medication therapy (has only been taking Myrbetriq for 2-3 days), alpha blocker therapy, timed and double voiding, keeping a bedside urinal due to mobility limitations, catheterization (CIC vs SPT vs urethral Albert), bladder Botox, PTNS, Interstim, or a possible outlet reduction procedure. As the OAB symptoms predominate, patient has elected for the following:  -Continue dual therapy with Myrbetriq 25 mg daily and Vesicare 5 mg daily for now.  -Work on double voiding to promote complete bladder emptying.  -Patient provided with a handheld urinal to keep by the bedside at night as he has significant mobility limitations that prevent him from getting to the bathroom in a timely manner.  -Follow up with me in 1 month for a uroflow/PVR. Depending on his symptoms and bladder emptying, will determine next steps at that time. May need to consider cystoscopy prior to deciding on any third line treatments in order to better understand the bladder outlet.     - A single Cipro antibiotic was provided for UTI prophylaxis following completion of today's study per department protocol.  The risk of UTI with VUDS is low at ~2.5-3%.      Thank you for allowing me to participate in the care of Mr. Austin Walker and please don't hesitate to contact me with any questions or concerns.      Karlene Odom PA-C  Urology Physician Assistant

## 2018-09-12 NOTE — MR AVS SNAPSHOT
After Visit Summary   9/12/2018    Austin Walker    MRN: [id number]           Patient Information     Date Of Birth          1945        Visit Information        Provider Department      9/12/2018 9:00 AM Karlene Odom PA-C [facility]rolog[facility]        Today's Diagnoses     Urge incontinence    -  1    Urinary frequency          Care Instructions    UROLOGY CLINIC VISIT PATIENT INSTRUCTIONS    Follow up with me in 1 month to recheck your urinary symptoms.    -Continue taking vesicare (solifenacin) 5 mg once daily.  -Continue taking mirabegron (Myrbetriq) 25 mg once daily.  -Use the handheld urinal at night and in emergency cases during the day when you cannot make it to the bathroom in time.    If you have any issues, questions or concerns in the meantime, do not hesitate to contact us at [phone] or via [employer].     It was a pleasure meeting with you today.  Thank you for allowing me and my team the privilege of caring for you today.  YOU are the reason we are here, and I truly hope we provided you with the excellent service you deserve.  Please let us know if there is anything else we can do for you so that we can be sure you are leaving completely satisfied with your care experience.                Follow-ups after your visit        Your next 10 appointments already scheduled     Sep 25, 2018  1:30 PM CDT   Lab with  LAB   [facility]    [address]  1st Floor  [facility] [address] [phone]            Sep 25, 2018  2:30 PM CDT   (Arrive by 2:00 PM)   Return Visit with Tiny Duff NP   [facility]    [address]  [address]  [facility] [address] [phone]              Who to contact     Please call your clinic at [phone] to:    Ask questions about your health    Make or cancel appointments    Discuss your  "medicines    Learn about your test results    Speak to your doctor            Additional Information About Your Visit        [employer] Information     [employer] gives you secure access to your electronic health record. If you see a primary care provider, you can also send messages to your care team and make appointments. If you have questions, please call your primary care clinic.  If you do not have a primary care provider, please call [phone] and they will assist you.      [employer] is an electronic gateway that provides easy, online access to your medical records. With [employer], you can request a clinic appointment, read your test results, renew a prescription or communicate with your care team.     To access your existing account, please contact your [facility] or call [phone] for assistance.        Care EveryWhere ID     This is your Care EveryWhere ID. This could be used by other organizations to access your [place] medical records  [id number]        Your Vitals Were     Pulse Height BMI (Body Mass Index)             64 1.803 m (5' 11\") 30.96 kg/m2          Blood Pressure from Last 3 Encounters:   09/12/18 146/63   09/10/18 163/75   08/22/18 127/69    Weight from Last 3 Encounters:   09/12/18 100.7 kg (222 lb)   09/10/18 95.3 kg (210 lb)   08/22/18 92.1 kg (203 lb)              We Performed the Following     CYSTOMETROGRAM COMPLEX W VOIDING PRESS PROFILE     EMG ANAL/URETH SPHINCTER, OTHER THAN NEEDLE     HC CONTRAST ISOVUE 370 MG/ML, PER ML     Urinalysis chemical screen POCT     VOIDING PRESSURE STUDY, INTRA-ABDOMINAL     XR Cystogram Voiding          Today's Medication Changes          These changes are accurate as of 9/12/18 10:36 AM.  If you have any questions, ask your nurse or doctor.               These medicines have changed or have updated prescriptions.        Dose/Directions    insulin detemir 100 UNIT/ML injection   Commonly known as:  LEVEMIR FLEXPEN/FLEXTOUCH "   This may have changed:    - how much to take  - additional instructions   Used for:  Diabetes mellitus, type 2 (H)        Dose:  10 Units   Inject 10 Units Subcutaneous 2 times daily Dose was originally 50 units BID but pt has been cutting back   Refills:  0                Primary Care Provider Office Phone # Fax #    Celso Esparza [phone]       [facility] [address]  [facility] [address]        Equal Access to Services     [facility] CenterALBERTA : Hadii aad ku hadasho Soomaali, waaxda luqadaha, qaybta kaalmada adeegyada, waxay idiin hayaan adeeg khthompsonketurah lajohn . So [facility] [phone].    ATENCIÓN: Si habla español, tiene a schaefer disposición servicios gratuitos de asistencia lingüística. [facility] [phone].    We comply with applicable federal civil rights laws and [place] laws. We do not discriminate on the basis of race, color, national origin, age, disability, sex, sexual orientation, or gender identity.            Thank you!     Thank you for choosing [facility] UROLOGY AND [facility] FOR PROSTATE AND UROLOGIC CANCERS  for your care. Our goal is always to provide you with excellent care. Hearing back from our patients is one way we can continue to improve our services. Please take a few minutes to complete the written survey that you may receive in the mail after your visit with us. Thank you!             Your Updated Medication List - Protect others around you: Learn how to safely use, store and throw away your medicines at www.disposemymeds.org.          This list is accurate as of 9/12/18 10:36 AM.  Always use your most recent med list.                   Brand Name Dispense Instructions for use Diagnosis    amphetamine-dextroamphetamine 10 MG per tablet    ADDERALL     Take 10 mg by mouth 3 times daily (before meals)        ARANESP (ALBUMIN FREE) 300 MCG/ML Soln   Generic drug:  darbepoetin aaliyah-polysorbate      Inject 300 mcg Subcutaneous every 14 days As needed  for hgb <=11g/dL        ASPIRIN EC PO      Take 81 mg by mouth daily        atorvastatin 20 MG tablet    LIPITOR     Take 20 mg by mouth every evening 1700        B-D U/F 31G X 8 MM   Generic drug:  insulin pen needle      USE AS DIRECTED        blood glucose monitoring lancets      3x/day with blood glucose testing        ferrous sulfate 325 (65 Fe) MG tablet    IRON     Take 325 mg by mouth 2 times daily        FLONASE 50 MCG/ACT spray   Generic drug:  fluticasone      Spray 1 spray into both nostrils 2 times daily as needed        gabapentin 300 MG capsule    NEURONTIN     Take 300 mg by mouth daily He takes 300mg about every 3 days        11/11/15  Pt states he takes 300mg 2x/day        GLUCOSAMINE SULFATE PO      Take by mouth daily        insulin aspart 100 UNIT/ML injection    NovoLOG PEN     Inject subcutaneous 3 times daily with meals and bedtime?Medium Scale?less than 70 .................. Refer to Hypoglycemia?Treatment Protocol?70 - 149 ......................... No corrective insulin?150 - 199 ....................... 2 units?200 - 249 ....................... 4 units?250 - 299 ....................... 6 units?300 - 349 ....................... 8 units?350 or greater ................ 10 units and notify MD        insulin detemir 100 UNIT/ML injection    LEVEMIR FLEXPEN/FLEXTOUCH     Inject 10 Units Subcutaneous 2 times daily Dose was originally 50 units BID but pt has been cutting back    Diabetes mellitus, type 2 (H)       LASIX 40 MG tablet   Generic drug:  furosemide     90 tablet    Take 1 tablet (40 mg) by mouth 2 times daily    Essential hypertension, benign       loratadine 10 MG tablet    CLARITIN     Take 10 mg by mouth daily        losartan 100 MG tablet    COZAAR    90 tablet    TAKE 1 TABLET BY MOUTH DAILY    HTN (hypertension)       meclizine 25 MG tablet    ANTIVERT     Take 25 mg by mouth 2 times daily        metoprolol tartrate 25 MG tablet    LOPRESSOR    180 tablet    TAKE 1 TABLET(25 MG)  BY MOUTH TWICE DAILY    HTN (hypertension)       minoxidil 2.5 MG tablet    LONITEN    180 tablet    TAKE 2 TABLETS BY MOUTH DAILY.    HTN (hypertension), CKD (chronic kidney disease) stage 4, GFR 15-29 ml/min (H)       mirabegron 25 MG 24 hr tablet    MYRBETRIQ    30 tablet    Take 1 tablet (25 mg) by mouth daily    Urge incontinence       MULTI-VITAMINS Tabs      Take 1 tablet by mouth daily        NUVIGIL PO      Take 200 mg by mouth every morning        omeprazole 20 MG CR capsule    priLOSEC     Take 20 mg by mouth daily        OSTEO BI-FLEX ADV TRIPLE ST PO      Take by mouth daily        sodium bicarbonate 650 MG tablet     120 tablet    Take 1 tablet (650 mg) by mouth 2 times daily    CKD (chronic kidney disease)       solifenacin 5 MG tablet    VESICARE    30 tablet    Take 1 tablet (5 mg) by mouth daily    Urge incontinence       UNABLE TO FIND      Take by mouth daily MEDICATION NAME: Brain Peak

## 2018-09-12 NOTE — PROGRESS NOTES
PREPROCEDURE DIAGNOSES:    1. Urinary urgency, frequency, nocturia  2. Urge urinary incontinence     POSTPROCEDURE DIAGNOSES:  -Small bladder capacity (~270 mL) with normal filling sensations.   -Fair/poor bladder compliance (ratio 7.9) with quite a bit of detrusor overactivity throughout filling (max Pdet reaches ~30 cm H2O) but no incontinence (DO or stress-related).  -Strong detrusor contraction during voiding to 58.6 cm H2O with a slow flow (Qmax recorded as 1.7 mL/sec but this is artificially low as his urine stream was disrupted by the sheet underneath patient, so the flow was trickling off the sheet into the uroflow).  -There is also some incomplete bladder emptying (voided 100 mL; final  mL).   -Given these findings (high pressure/low flow), there is some evidence for possible bladder outlet obstruction which is further supported by BOOI of 48.9.  -No evidence for DSD as EMG tracings remain quiet during voiding.  -Fluoroscopy reveals a smooth-walled bladder without diverticuli or VUR. Bladder neck is closed during filling and open during voiding with possible area of narrowing in the mid-proximal urethra.    PROCEDURE:    1. Sterile urethral catheterization for measurement of postvoid residual urine volume.  2. Complex filling cystometrogram with measurement of bladder and rectal pressures.  3. Complex voiding cystometrogram with measurement of bladder and rectal pressures.  4. Electromyography of the pelvic floor during urodynamics.  5. Fluoroscopic imaging of the bladder during urodynamics, at least 3 views.    6. Interpretation of urodynamics and flouroscopic imaging.      INDICATIONS FOR PROCEDURE:  Mr. Austin Walker is a pleasant 73 year old male with urinary frequency, urgency, nocturia, and urge incontinence. Symptoms began after he started taking Lasix 3 years ago (takes 80 mg qAM and 60 mg around 2pm). Initial voiding diaries showed frequent voids (q30-90 minutes) during the day and voiding  in small volumes (100-280 mL per void). He attempted lifestyle modifications including limiting bladder irritants and timed/double voiding, but this did not help and so he was initiated on Vesicare 5 mg daily. This helped initially but was then less helpful. Therefore, Myrbetriq 25 mg once daily was added (only 3 days ago) and he presents today for baseline video urodynamic assessment to better characterize his voiding dysfunction. He states that he has already noticed some improvement over the last 2-3 days since starting the Myrbetriq. Nocturia is down to 1-2 but he is still having urge incontinence during the day.    VOIDING DIARY:  Bladder Diary (PRIOR TO VESICARE):  Voids q30-90 minutes during the day, nocturia x 0-1.   Episodes of incontinence: multiple per day, goes through 4-6 Depends per day.  Incontinence associated with: sitting to standing, strong urge  Volume Intake: 1 cup of coffee, 2-4 glasses of water, 1 glass of OJ  Voided Volume: 1890 mL per day; average voided volume: 150 mL; largest voided volume: 280 mL     Bladder Diary (AFTER STARTING VESICARE):  Voids q2 hours during the day, nocturia x 0-1.   Episodes of incontinence: 3 per day, goes through 4 Depends per day.  Incontinence associated with: strong urge  Volume Intake: 5 cups of coffee, 1 glass of OJ  Voided Volume: 1800 mL per day; average voided volume: 225 mL; largest voided volume: 295 mL    DESCRIPTION OF PROCEDURE:  Risks, benefits, and alternatives to urodynamics were discussed with the patient and he wished to proceed.  Urodynamics are planned to better assess the primary etiology for Mr. Walker's urologic dysfunction.  The patient last took anticholinergic medication 2 hours ago.  After informed consent was obtained, the patient was taken to the procedure room where uroflowmetry was performed. Findings below.     PRE-STUDY UROFLOWMETRY:  Not performed as patient had just voided and had no urge to void again.  Postvoid residual by  catheter: 80 mL.  Pretest urine dipstick was negative for leukocytes and nitrites.    Next a 7F double-lumen urodynamics catheter was inserted into the bladder under sterile technique via native urethra.  A 7F abdominal manometry catheter was placed in the rectum.  EMG pads were placed on both sides of the anal verge.  The bladder was filled with 200 mL of Cystografin at 25 mL/minute and serial pressures were recorded.  With coughing there was an appropriate rise in vesical and abdominal pressures with no change in detrusor pressure, confirming good study catheter placement.    DURING THE FILLING PHASE:  First sensation: 175 mL.  First Desire: 178 mL.  Strong Desire: 211 mL.  Maximum Capacity: 268 mL.    Uninhibited detrusor contractions: there is quite a bit of detrusor overactivity throughout filling but he never leaks.  Compliance: fair/poor. PDet=24 cmH20 at capacity. Compliance ratio of 7.9.  Continence: no DOI or DINESH  EMG: concordant during filling.    DURING THE VOIDING PHASE:  Maximum detrusor contraction with void: 58.6 cm of H2O pressure.  Voided volume: ~100 mL (estimate that 45-50 mL soaked into the sheet underneath the patient rather than collecting in the uroflow container which only recorded 48.7 mL).  Maximum flow rate: 1.7 mL/sec (this is artificially low as his urine stream was disrupted by the sheet underneath patient, so the flow was trickling off the sheet into the uroflow).  Average flow rate: 1.2 mL/sec.  Postvoid Residual: 175 mL.  EMG activity: remains quiet during voiding, making DSD unlikely  Character of voiding curve: low amplitude.  BOOI: 48.9 (suggesting possible obstruction - see key below)  [obstructed (WREN index [BOOI] ? 40); equivocal (no definite   obstruction; BOOI 20-40); and no obstruction (BOOI ? 20)]    FLUOROSCOPIC IMAGING OF THE BLADDER DURING URODYNAMICS:  Fluoroscopy during today's procedure demonstrated a smooth walled bladder without diverticulae or cellules.  No  vesicoureteral reflux was observed.  The bladder neck was closed during filling and open during voiding with possible area of narrowing in the mid-proximal urethra.  After voiding to sense of completion, all catheters were removed, the patient was straight catheterized for residual volume, and was brought back into the consultation room to further discuss today's study results.      ASSESSMENT/PLAN:  Mr. Austin Walker is a pleasant 73 year old male with urinary frequency, urgency, and urge incontinence who demonstrated the following findings today on urodynamic evaluation:    -Small bladder capacity (~270 mL) with normal filling sensations.   -Fair/poor bladder compliance (ratio 7.9) with quite a bit of detrusor overactivity throughout filling (max Pdet reaches ~30 cm H2O) but no incontinence (DO or stress-related).  -Strong detrusor contraction during voiding to 58.6 cm H2O with a slow flow (Qmax recorded as 1.7 mL/sec but this is artificially low as his urine stream was disrupted by the sheet underneath patient, so the flow was trickling off the sheet into the uroflow).  -There is also some incomplete bladder emptying (voided 100 mL; final  mL).   -Given these findings (high pressure/low flow), there is some evidence for possible bladder outlet obstruction which is further supported by BOOI of 48.9.  -No evidence for DSD as EMG tracings remain quiet during voiding.  -Fluoroscopy reveals a smooth-walled bladder without diverticuli or VUR. Bladder neck is closed during filling and open during voiding with possible area of narrowing in the mid-proximal urethra.    We discussed his study results in detail. In summary, he has findings of possible bladder outlet obstruction as well as detrusor instability despite dual therapy with Vesicare and Myrbetriq. We discussed potential management options including continuing on OAB medication therapy (has only been taking Myrbetriq for 2-3 days), alpha blocker therapy,  timed and double voiding, keeping a bedside urinal due to mobility limitations, catheterization (CIC vs SPT vs urethral Albert), bladder Botox, PTNS, Interstim, or a possible outlet reduction procedure. As the OAB symptoms predominate, patient has elected for the following:  -Continue dual therapy with Myrbetriq 25 mg daily and Vesicare 5 mg daily for now.  -Work on double voiding to promote complete bladder emptying.  -Patient provided with a handheld urinal to keep by the bedside at night as he has significant mobility limitations that prevent him from getting to the bathroom in a timely manner.  -Follow up with me in 1 month for a uroflow/PVR. Depending on his symptoms and bladder emptying, will determine next steps at that time. May need to consider cystoscopy prior to deciding on any third line treatments in order to better understand the bladder outlet.     - A single Cipro antibiotic was provided for UTI prophylaxis following completion of today's study per department protocol.  The risk of UTI with VUDS is low at ~2.5-3%.      Thank you for allowing me to participate in the care of Mr. Austin Walker and please don't hesitate to contact me with any questions or concerns.      Karlene Odom PA-C  Urology Physician Assistant

## 2018-09-24 ENCOUNTER — PRE VISIT (OUTPATIENT)
Dept: UROLOGY | Facility: CLINIC | Age: 73
End: 2018-09-24

## 2018-09-24 DIAGNOSIS — N18.4 CKD (CHRONIC KIDNEY DISEASE) STAGE 4, GFR 15-29 ML/MIN (H): Primary | ICD-10-CM

## 2018-09-25 ENCOUNTER — TELEPHONE (OUTPATIENT)
Dept: NEPHROLOGY | Facility: CLINIC | Age: 73
End: 2018-09-25

## 2018-09-25 ENCOUNTER — OFFICE VISIT (OUTPATIENT)
Dept: NEPHROLOGY | Facility: CLINIC | Age: 73
End: 2018-09-25
Payer: MEDICARE

## 2018-09-25 VITALS
BODY MASS INDEX: 28.31 KG/M2 | SYSTOLIC BLOOD PRESSURE: 126 MMHG | OXYGEN SATURATION: 99 % | TEMPERATURE: 98 F | DIASTOLIC BLOOD PRESSURE: 63 MMHG | WEIGHT: 202.2 LBS | HEIGHT: 71 IN

## 2018-09-25 DIAGNOSIS — D63.1 ANEMIA OF CHRONIC RENAL FAILURE, STAGE 4 (SEVERE) (H): ICD-10-CM

## 2018-09-25 DIAGNOSIS — N18.4 CKD (CHRONIC KIDNEY DISEASE) STAGE 4, GFR 15-29 ML/MIN (H): ICD-10-CM

## 2018-09-25 DIAGNOSIS — N18.4 ANEMIA OF CHRONIC RENAL FAILURE, STAGE 4 (SEVERE) (H): ICD-10-CM

## 2018-09-25 DIAGNOSIS — N25.81 SECONDARY RENAL HYPERPARATHYROIDISM (H): ICD-10-CM

## 2018-09-25 DIAGNOSIS — I10 ESSENTIAL HYPERTENSION: Primary | ICD-10-CM

## 2018-09-25 LAB
ALBUMIN SERPL-MCNC: 3.4 G/DL (ref 3.4–5)
ANION GAP SERPL CALCULATED.3IONS-SCNC: 9 MMOL/L (ref 3–14)
BUN SERPL-MCNC: 47 MG/DL (ref 7–30)
CALCIUM SERPL-MCNC: 8.9 MG/DL (ref 8.5–10.1)
CHLORIDE SERPL-SCNC: 110 MMOL/L (ref 94–109)
CO2 SERPL-SCNC: 20 MMOL/L (ref 20–32)
CREAT SERPL-MCNC: 2.82 MG/DL (ref 0.66–1.25)
CREAT UR-MCNC: 84 MG/DL
DEPRECATED CALCIDIOL+CALCIFEROL SERPL-MC: 31 UG/L (ref 20–75)
ERYTHROCYTE [DISTWIDTH] IN BLOOD BY AUTOMATED COUNT: 12.9 % (ref 10–15)
FERRITIN SERPL-MCNC: 250 NG/ML (ref 26–388)
GFR SERPL CREATININE-BSD FRML MDRD: 22 ML/MIN/1.7M2
GLUCOSE SERPL-MCNC: 222 MG/DL (ref 70–99)
HCT VFR BLD AUTO: 30.1 % (ref 40–53)
HGB BLD-MCNC: 10.3 G/DL (ref 13.3–17.7)
IRON SATN MFR SERPL: 36 % (ref 15–46)
IRON SERPL-MCNC: 70 UG/DL (ref 35–180)
MCH RBC QN AUTO: 32.6 PG (ref 26.5–33)
MCHC RBC AUTO-ENTMCNC: 34.2 G/DL (ref 31.5–36.5)
MCV RBC AUTO: 95 FL (ref 78–100)
PHOSPHATE SERPL-MCNC: 3.2 MG/DL (ref 2.5–4.5)
PLATELET # BLD AUTO: 191 10E9/L (ref 150–450)
POTASSIUM SERPL-SCNC: 4.2 MMOL/L (ref 3.4–5.3)
PROT UR-MCNC: 2.29 G/L
PROT/CREAT 24H UR: 2.74 G/G CR (ref 0–0.2)
PTH-INTACT SERPL-MCNC: 559 PG/ML (ref 18–80)
RBC # BLD AUTO: 3.16 10E12/L (ref 4.4–5.9)
SODIUM SERPL-SCNC: 138 MMOL/L (ref 133–144)
TIBC SERPL-MCNC: 195 UG/DL (ref 240–430)
TRANSFERRIN SERPL-MCNC: 153 MG/DL (ref 210–360)
WBC # BLD AUTO: 4.1 10E9/L (ref 4–11)

## 2018-09-25 PROCEDURE — 82306 VITAMIN D 25 HYDROXY: CPT

## 2018-09-25 PROCEDURE — 82728 ASSAY OF FERRITIN: CPT

## 2018-09-25 PROCEDURE — 83970 ASSAY OF PARATHORMONE: CPT

## 2018-09-25 PROCEDURE — 83550 IRON BINDING TEST: CPT

## 2018-09-25 PROCEDURE — 84156 ASSAY OF PROTEIN URINE: CPT

## 2018-09-25 PROCEDURE — 83540 ASSAY OF IRON: CPT

## 2018-09-25 PROCEDURE — 85027 COMPLETE CBC AUTOMATED: CPT

## 2018-09-25 PROCEDURE — 80069 RENAL FUNCTION PANEL: CPT

## 2018-09-25 PROCEDURE — 84466 ASSAY OF TRANSFERRIN: CPT

## 2018-09-25 PROCEDURE — G0463 HOSPITAL OUTPT CLINIC VISIT: HCPCS | Mod: ZF

## 2018-09-25 PROCEDURE — 36415 COLL VENOUS BLD VENIPUNCTURE: CPT

## 2018-09-25 RX ORDER — FUROSEMIDE 20 MG
20 TABLET ORAL 2 TIMES DAILY
Qty: 30 TABLET
Start: 2018-09-25 | End: 2018-11-22

## 2018-09-25 RX ORDER — FUROSEMIDE 20 MG
20 TABLET ORAL SEE ADMIN INSTRUCTIONS
Qty: 30 TABLET
Start: 2018-09-25 | End: 2018-09-25

## 2018-09-25 RX ORDER — LOSARTAN POTASSIUM 50 MG/1
50 TABLET ORAL DAILY
Qty: 90 TABLET | Refills: 3 | Status: SHIPPED | OUTPATIENT
Start: 2018-09-25 | End: 2018-12-19

## 2018-09-25 RX ORDER — METOPROLOL TARTRATE 25 MG/1
TABLET, FILM COATED ORAL
Qty: 180 TABLET | Refills: 3 | Status: SHIPPED | OUTPATIENT
Start: 2018-09-25 | End: 2018-10-16

## 2018-09-25 RX ORDER — MINOXIDIL 2.5 MG/1
2.5 TABLET ORAL DAILY
Qty: 180 TABLET | Refills: 3
Start: 2018-09-25 | End: 2018-11-21

## 2018-09-25 ASSESSMENT — PAIN SCALES - GENERAL: PAINLEVEL: NO PAIN (0)

## 2018-09-25 NOTE — PATIENT INSTRUCTIONS
1. Decrease Lasix to 20 mg twice per day  2. Decrease Minoxidil to 2.5 mg daily  3. Restart Losartan 50 mg daily  4. Check blood pressures daily  5. Return to see me in one month  6. Call clinic if blood pressures are greater than 160/ for further directions   R/O Tingling of face

## 2018-09-25 NOTE — MR AVS SNAPSHOT
After Visit Summary   9/25/2018    Austin Walker    MRN: [id number]           Patient Information     Date Of Birth          1945        Visit Information        Provider Department      9/25/2018 2:30 PM Tiny Duff NP [facility]        Today's Diagnoses     Benign essential hypertension    -  1    Essential hypertension        CKD (chronic kidney disease) stage 4, GFR 15-29 ml/min (H)          Care Instructions    1. Decrease Lasix to 20 mg twice per day  2. Decrease Minoxidil to 2.5 mg daily  3. Restart Losartan 50 mg daily  4. Check blood pressures daily  5. Return to see me in one month  6. Call clinic if blood pressures are greater than 160/ for further directions          Follow-ups after your visit        Follow-up notes from your care team     Return in about 4 weeks (around 10/23/2018).      Your next 10 appointments already scheduled     Oct 15, 2018 12:30 PM CDT   (Arrive by 12:15 PM)   Return Visit with Karlene Odom PA-C   [facility] ([facility])    [address] [place] [address]  4th Floor  [facility] [address] [phone]            Oct 23, 2018  1:30 PM CDT   Lab with  LAB   [facility] ([facility])    [address] [place] [address]  1st Floor  [facility] [address] [phone]            Oct 23, 2018  2:30 PM CDT   (Arrive by 2:00 PM)   Return Visit with Tiny Duff NP   [facility] ([facility])    [address] [place] [address]  [address]  [facility] [address] [phone]              Who to contact     If you have questions or need follow up information about today's clinic visit or your schedule please contact [facility] directly at [phone].  Normal or non-critical lab and imaging results will be communicated to you by MyChart, letter or phone within 4 business days after the clinic has received the  "results. If you do not hear from us within 7 days, please contact the clinic through [employer] or phone. If you have a critical or abnormal lab result, we will notify you by phone as soon as possible.  Submit refill requests through [employer] or call your pharmacy and they will forward the refill request to us. Please allow 3 business days for your refill to be completed.          Additional Information About Your Visit        [employer] Information     [employer] gives you secure access to your electronic health record. If you see a primary care provider, you can also send messages to your care team and make appointments. If you have questions, please call your primary care clinic.  If you do not have a primary care provider, please call [phone] and they will assist you.        Care EveryWhere ID     This is your Care EveryWhere ID. This could be used by other organizations to access your [facility] medical records  [id number]        Your Vitals Were     Temperature Height Pulse Oximetry BMI (Body Mass Index)          98  F (36.7  C) (Oral) 1.803 m (5' 11\") 99% 28.2 kg/m2         Blood Pressure from Last 3 Encounters:   09/25/18 126/63   09/12/18 146/63   09/10/18 163/75    Weight from Last 3 Encounters:   09/25/18 91.7 kg (202 lb 3.2 oz)   09/12/18 100.7 kg (222 lb)   09/10/18 95.3 kg (210 lb)              Today, you had the following     No orders found for display         Today's Medication Changes          These changes are accurate as of 9/25/18  3:18 PM.  If you have any questions, ask your nurse or doctor.               Start taking these medicines.        Dose/Directions    losartan 50 MG tablet   Commonly known as:  COZAAR   Used for:  Essential hypertension   Started by:  Tiny Duff NP        Dose:  50 mg   Take 1 tablet (50 mg) by mouth daily   Quantity:  90 tablet   Refills:  3         These medicines have changed or have updated prescriptions.        Dose/Directions    furosemide 20 MG tablet "   Commonly known as:  LASIX   This may have changed:    - medication strength  - how much to take   Used for:  Benign essential hypertension   Changed by:  Tiny Duff NP        Dose:  20 mg   Take 1 tablet (20 mg) by mouth 2 times daily   Quantity:  30 tablet   Refills:  0       insulin detemir 100 UNIT/ML injection   Commonly known as:  LEVEMIR FLEXPEN/FLEXTOUCH   This may have changed:    - how much to take  - additional instructions   Used for:  Diabetes mellitus, type 2 (H)        Dose:  10 Units   Inject 10 Units Subcutaneous 2 times daily Dose was originally 50 units BID but pt has been cutting back   Refills:  0       minoxidil 2.5 MG tablet   Commonly known as:  LONITEN   This may have changed:  See the new instructions.   Used for:  CKD (chronic kidney disease) stage 4, GFR 15-29 ml/min (H)   Changed by:  Tiny Duff NP        Dose:  2.5 mg   Take 1 tablet (2.5 mg) by mouth daily   Quantity:  180 tablet   Refills:  3            Where to get your medicines      These medications were sent to [address], [facility] [address]    Hours:  24-hours Phone:  [phone]     losartan 50 MG tablet    metoprolol tartrate 25 MG tablet         Some of these will need a paper prescription and others can be bought over the counter.  Ask your nurse if you have questions.     You don't need a prescription for these medications     furosemide 20 MG tablet    minoxidil 2.5 MG tablet                Primary Care Provider Office Phone # Fax #    Celso Esparza [phone]       [facility] [address]  [facility] [address]        Equal Access to Services     [facility]: Hadii lili Sotelo, watabbyda lukatie, qaybta kaalmahunter uribe, awilda leigh. So [facility] [phone].    ATENCIÓN: Si habla español, tiene a schaefer disposición servicios gratuitos de  asistencia lingüística. Edgardo al [phone].    We comply with applicable federal civil rights laws and [place] laws. We do not discriminate on the basis of race, color, national origin, age, disability, sex, sexual orientation, or gender identity.            Thank you!     Thank you for choosing [facility]  for your care. Our goal is always to provide you with excellent care. Hearing back from our patients is one way we can continue to improve our services. Please take a few minutes to complete the written survey that you may receive in the mail after your visit with us. Thank you!             Your Updated Medication List - Protect others around you: Learn how to safely use, store and throw away your medicines at www.disposemymeds.org.          This list is accurate as of 9/25/18  3:18 PM.  Always use your most recent med list.                   Brand Name Dispense Instructions for use Diagnosis    ARANESP (ALBUMIN FREE) 300 MCG/ML Soln   Generic drug:  darbepoetin aaliyah-polysorbate      Inject 300 mcg Subcutaneous every 14 days As needed for hgb <=11g/dL        ASPIRIN EC PO      Take 81 mg by mouth daily        atorvastatin 20 MG tablet    LIPITOR     Take 20 mg by mouth every evening 1700        B-D U/F 31G X 8 MM   Generic drug:  insulin pen needle      USE AS DIRECTED        blood glucose monitoring lancets      3x/day with blood glucose testing        ferrous sulfate 325 (65 Fe) MG tablet    IRON     Take 325 mg by mouth 2 times daily        FLONASE 50 MCG/ACT spray   Generic drug:  fluticasone      Spray 1 spray into both nostrils 2 times daily as needed        furosemide 20 MG tablet    LASIX    30 tablet    Take 1 tablet (20 mg) by mouth 2 times daily    Benign essential hypertension       gabapentin 300 MG capsule    NEURONTIN     Take 600 mg by mouth 2 times daily        insulin aspart 100 UNIT/ML injection    NovoLOG PEN     Inject subcutaneous 3 times daily with meals and bedtime?Medium  Scale?less than 70 .................. Refer to Hypoglycemia?Treatment Protocol?70 - 149 ......................... No corrective insulin?150 - 199 ....................... 2 units?200 - 249 ....................... 4 units?250 - 299 ....................... 6 units?300 - 349 ....................... 8 units?350 or greater ................ 10 units and notify MD        insulin detemir 100 UNIT/ML injection    LEVEMIR FLEXPEN/FLEXTOUCH     Inject 10 Units Subcutaneous 2 times daily Dose was originally 50 units BID but pt has been cutting back    Diabetes mellitus, type 2 (H)       loratadine 10 MG tablet    CLARITIN     Take 10 mg by mouth daily        losartan 50 MG tablet    COZAAR    90 tablet    Take 1 tablet (50 mg) by mouth daily    Essential hypertension       metoprolol tartrate 25 MG tablet    LOPRESSOR    180 tablet    TAKE 1 TABLET(25 MG) BY MOUTH TWICE DAILY    Essential hypertension       minoxidil 2.5 MG tablet    LONITEN    180 tablet    Take 1 tablet (2.5 mg) by mouth daily    CKD (chronic kidney disease) stage 4, GFR 15-29 ml/min (H)       mirabegron 25 MG 24 hr tablet    MYRBETRIQ    30 tablet    Take 1 tablet (25 mg) by mouth daily    Urge incontinence       MULTI-VITAMINS Tabs      Take 1 tablet by mouth daily        omeprazole 20 MG CR capsule    priLOSEC     Take 20 mg by mouth daily        OSTEO BI-FLEX ADV TRIPLE ST PO      Take by mouth daily        sodium bicarbonate 650 MG tablet     120 tablet    Take 1 tablet (650 mg) by mouth 2 times daily    CKD (chronic kidney disease)       solifenacin 5 MG tablet    VESICARE    30 tablet    Take 1 tablet (5 mg) by mouth daily    Urge incontinence       UNABLE TO FIND      Take by mouth daily MEDICATION NAME: Brain Peak

## 2018-09-25 NOTE — NURSING NOTE
Chief Complaint   Patient presents with     RECHECK     CKD stage 4   Pt roomed, vitals, meds, and allergies reviewed with pt. Pt ready for provider.  Ranjit Moreno, CMA

## 2018-09-25 NOTE — LETTER
9/25/2018       RE: Austin Walker  [address] [facility] [address]     Dear Colleague,    Thank you for referring your patient, Austin Walker, to the [facility] NEPHROLOGY at [facility]. Please see a copy of my visit note below.    Nephrology Clinic Visit 9/25/18    Assessment and Plan:      1. Medication questions - Patient brought in his medications today and we were able to clean up his list.      2. CKD4, non proteinuric - Creat 2.8 today, eGFR 22 ml/mn. No retention found during Urology evaluation in Sept 2018. On vesicare, just added Mirabegron by Urology.   Etiology of CKD is bx proven DM/HTN related CKD   - No uremic symptoms   - Has been relatively stable since 2016   - Would refer to [facility] if renal function begins to decline more rapidly   - Does not use NSAIDs   - On renal dosing for Vesicare     3. Volume status - Close to euvolemia. Has only trace edema. No dyspnea. Albumin nml. Weight 91.7 kg, despite holding diuretics x 1 wk. Was 92.1 kg last visit. 's/ x 3. On Lasix 80 mg Am, 60 mg PM.     -  Given severe urinary problems and goal to wean off Minoxidil, will decrease to 20 mg bid.      4. HTN - Well controlled. Clinic blood pressures today; 126-128/63-68 on the following: Home blood pressures in the 130/   Lasix  80/60, but has been held by patient x 1 wk   Losartan 100 mg every day was ordered, but was not taking   Lopressor 25 mg bid   Minoxidil  5.0 mg every day  - Will change to the following:     Minoxidil 2.5 mg every day ( will go to [facility] next visit)    Lasix 20 mg bid    Losartan 50 mg every day    Continue Lopressor at 25 mg bid  - Will check chemistry labs in 2 weeks   - Will have my nurse call regarding blood pressures in one week   - Continue daily blood pressure monitoring     5. Electrolytes - No acute concerns. K 4.2     6. Acid base - No acute concerns. Bicarb 20   - Continue Bicarb 650 mg bid     7. BMD - Ca 8.9, Phos  3.2, albumin 3.4   - Vit D 31 (9/18)   -  (9/18)   -  Will start Calcitriol 0.25 mcq qd     8. Anemia - Hgb 10.3.    - Enrolled in Anemia management   - On iron bid   - Iron studies 9/18: Ferritin 250, IS 36   - Will check on last colonoscopy     9. Disposition - RTC one month for f/u.      Assessment and plan was discussed with patient and he voiced his understanding and agreement.     Reason for Visit:  CKD4/HTN f/u     HPI:  Mr Walker is a 72 yo male with CKD4, HTN, DM2, MGUS, previous CVA, in today for routine CKD f/u. Last seen by me on 8/22/18 and I had questions regarding his medications at that time. Baseline creat in the upper 2-3 range since 8/16. He is non proteinuric.      Interval Hx:   No hospital admissions  Being followed by Urology for urinary frequency, incontinence. No retention.      ROS:  Major complaint is ongoing severe urinary frequency/incontinence. Additional agent added by Urology for treatment. Patient has held his Lasix for one week w/o much improvement in his voiding pattern, however, he really has no significant edema. I suspect he frequently self adjusts his diuretic regimen given the frequency/incontinence. Denies dyspnea, CP, abdominal pain. Appetite is OK but only eats one meal/day.      Chronic Health Problems:     CKD4  DM2  H/O CVA  HTN  MGUS  Anemia  PRINCE on CPAP  Severe Urinary frequency/incontinence  HLD     Personal Hx:   Single, lives alone in own home. Sets up own meds. Does not drive, NS, ETOH none, uses walker      Allergies:  Allergies   Allergen Reactions     Lisinopril      Other reaction(s): Renal Failure     Armodafinil Rash       Medications:  Prior to Admission medications    Medication Sig Start Date End Date Taking? Authorizing Provider   ASPIRIN EC PO Take 81 mg by mouth daily    Yes Unknown, Entered By History   atorvastatin (LIPITOR) 20 MG tablet Take 20 mg by mouth every evening 1700 5/9/14  Yes Reported, Patient   blood glucose monitoring (NOVA  SUREFLEX) lancets 3x/day with blood glucose testing 6/3/08  Yes Reported, Patient   darbepoetin aaliyah-polysorbate (ARANESP, ALBUMIN FREE,) 300 MCG/ML SOLN Inject 300 mcg Subcutaneous every 14 days As needed for hgb <=11g/dL   Yes Reported, Patient   ferrous sulfate 325 (65 FE) MG tablet Take 325 mg by mouth 2 times daily  2/14/14  Yes Reported, Patient   fluticasone (FLONASE) 50 MCG/ACT nasal spray Spray 1 spray into both nostrils 2 times daily as needed  6/24/14  Yes Reported, Patient   furosemide (LASIX) 20 MG tablet Take 1 tablet (20 mg) by mouth 2 times daily 9/25/18  Yes OmegaTiny, NP   gabapentin (NEURONTIN) 300 MG capsule Take 600 mg by mouth 2 times daily   Yes Reported, Patient   insulin aspart (NOVOLOG PEN) 100 UNIT/ML injection Inject subcutaneous 3 times daily with meals and bedtime Medium Scale less than 70 .................. Refer to Hypoglycemia Treatment Protocol 70 - 149 ......................... No corrective insulin 150 - 199 ....................... 2 units 200 - 249 ....................... 4 units 250 - 299 ....................... 6 units 300 - 349 ....................... 8 units 350 or greater ................ 10 units and notify MD   3/24/17  Yes Reported, Patient   insulin detemir (LEVEMIR FLEXPEN/FLEXTOUCH) 100 UNIT/ML soln Inject 10 Units Subcutaneous 2 times daily Dose was originally 50 units BID but pt has been cutting back  Patient taking differently: Inject 30 Units Subcutaneous 2 times daily Dose was originally 50 units BID but pt has been cutting back 2/13/15  Yes Ra Lehman MD   insulin pen needle (B-D U/F) 31G X 8 MM USE AS DIRECTED 1/9/17  Yes Reported, Patient   loratadine (CLARITIN) 10 MG tablet Take 10 mg by mouth daily   Yes Reported, Patient   losartan (COZAAR) 50 MG tablet Take 1 tablet (50 mg) by mouth daily 9/25/18  Yes Omega, Tiny Fields, NP   metoprolol tartrate (LOPRESSOR) 25 MG tablet TAKE 1 TABLET(25 MG) BY MOUTH TWICE DAILY 9/25/18  Yes Omega,  "Tiny Donnie, NP   minoxidil (LONITEN) 2.5 MG tablet Take 1 tablet (2.5 mg) by mouth daily 9/25/18  Yes Omega, Tiny Fields NP   mirabegron (MYRBETRIQ) 25 MG 24 hr tablet Take 1 tablet (25 mg) by mouth daily 9/10/18  Yes Karlene Odom PA-C   Misc Natural Products (OSTEO BI-FLEX ADV TRIPLE ST PO) Take by mouth daily   Yes Reported, Patient   omeprazole (PRILOSEC) 20 MG capsule Take 20 mg by mouth daily  6/30/14  Yes Reported, Patient   sodium bicarbonate 650 MG tablet Take 1 tablet (650 mg) by mouth 2 times daily 4/16/15  Yes Shon Vyas MD   solifenacin (VESICARE) 5 MG tablet Take 1 tablet (5 mg) by mouth daily 4/9/18  Yes Karlene Odom PA-C   UNABLE TO FIND Take by mouth daily MEDICATION NAME: Brain Peak   Yes Reported, Patient   Multiple Vitamin (MULTI-VITAMINS) TABS Take 1 tablet by mouth daily  2/14/14   Reported, Patient       Vitals:  /63  Temp 98  F (36.7  C) (Oral)  Ht 1.803 m (5' 11\")  Wt 91.7 kg (202 lb 3.2 oz)  SpO2 99%  BMI 28.2 kg/m2    Exam:  GEN: Elderly male in NAD.   CARDIAC: RRR  LUNGS: CTA  ABDOMEN: Soft, NT  EXT: tr edema    Results:  Results for NATALIE DE LA CRUZ (MRN [id number]) as of 9/26/2018 09:49   Ref. Range 9/25/2018 13:20 9/25/2018 13:57   Sodium Latest Ref Range: 133 - 144 mmol/L 138    Potassium Latest Ref Range: 3.4 - 5.3 mmol/L 4.2    Chloride Latest Ref Range: 94 - 109 mmol/L 110 (H)    Carbon Dioxide Latest Ref Range: 20 - 32 mmol/L 20    Urea Nitrogen Latest Ref Range: 7 - 30 mg/dL 47 (H)    Creatinine Latest Ref Range: 0.66 - 1.25 mg/dL 2.82 (H)    GFR Estimate Latest Ref Range: >60 mL/min/1.7m2 22 (L)    GFR Estimate If Black Latest Ref Range: >60 mL/min/1.7m2 27 (L)    Calcium Latest Ref Range: 8.5 - 10.1 mg/dL 8.9    Anion Gap Latest Ref Range: 3 - 14 mmol/L 9    Phosphorus Latest Ref Range: 2.5 - 4.5 mg/dL 3.2    Albumin Latest Ref Range: 3.4 - 5.0 g/dL 3.4    Creatinine Urine Latest Units: mg/dL  84   Ferritin Latest Ref Range: 26 - 388 ng/mL " 250    Iron Latest Ref Range: 35 - 180 ug/dL 70    Iron Binding Cap Latest Ref Range: 240 - 430 ug/dL 195 (L)    Iron Saturation Index Latest Ref Range: 15 - 46 % 36    Protein Random Urine Latest Units: g/L  2.29   Protein Total Urine g/gr Creatinine Latest Ref Range: 0 - 0.2 g/g Cr  2.74 (H)   Transferrin Latest Ref Range: 210 - 360 mg/dL 153 (L)    Vitamin D Deficiency screening Latest Ref Range: 20 - 75 ug/L 31    Glucose Latest Ref Range: 70 - 99 mg/dL 222 (H)    WBC Latest Ref Range: 4.0 - 11.0 10e9/L 4.1    Hemoglobin Latest Ref Range: 13.3 - 17.7 g/dL 10.3 (L)    Hematocrit Latest Ref Range: 40.0 - 53.0 % 30.1 (L)    Platelet Count Latest Ref Range: 150 - 450 10e9/L 191    RBC Count Latest Ref Range: 4.4 - 5.9 10e12/L 3.16 (L)    MCV Latest Ref Range: 78 - 100 fl 95    MCH Latest Ref Range: 26.5 - 33.0 pg 32.6    MCHC Latest Ref Range: 31.5 - 36.5 g/dL 34.2    RDW Latest Ref Range: 10.0 - 15.0 % 12.9    Parathyroid Hormone Intact Latest Ref Range: 18 - 80 pg/mL 559 (H)        Again, thank you for allowing me to participate in the care of your patient.      Sincerely,    Tiny Duff NP

## 2018-09-25 NOTE — TELEPHONE ENCOUNTER
Per Sheryl, call to patient, left VM to bring in his labelled medication bottles and medication list, to appointment today. Provided number for call back with questions.  Flavia Sandoval LPN  Nephrology  [phone]

## 2018-09-25 NOTE — LETTER
9/25/2018      RE: Austin Walker  [address] [facility] [address]       Nephrology Clinic Visit 9/25/18    Assessment and Plan:      1. Medication questions - Patient brought in his medications today and we were able to clean up his list.      2. CKD4, non proteinuric - Creat 2.8 today, eGFR 22 ml/mn. No retention found during Urology evaluation in Sept 2018. On vesicare, just added Mirabegron by Urology.   Etiology of CKD is bx proven DM/HTN related CKD   - No uremic symptoms   - Has been relatively stable since 2016   - Would refer to [facility] if renal function begins to decline more rapidly   - Does not use NSAIDs   - On renal dosing for Vesicare     3. Volume status - Close to euvolemia. Has only trace edema. No dyspnea. Albumin nml. Weight 91.7 kg, despite holding diuretics x 1 wk. Was 92.1 kg last visit. 's/ x 3. On Lasix 80 mg Am, 60 mg PM.     -  Given severe urinary problems and goal to wean off Minoxidil, will decrease to 20 mg bid.      4. HTN - Well controlled. Clinic blood pressures today; 126-128/63-68 on the following: Home blood pressures in the 130/   Lasix  80/60, but has been held by patient x 1 wk   Losartan 100 mg every day was ordered, but was not taking   Lopressor 25 mg bid   Minoxidil  5.0 mg every day  - Will change to the following:     Minoxidil 2.5 mg every day ( will go to [facility] next visit)    Lasix 20 mg bid    Losartan 50 mg every day    Continue Lopressor at 25 mg bid  - Will check chemistry labs in 2 weeks   - Will have my nurse call regarding blood pressures in one week   - Continue daily blood pressure monitoring     5. Electrolytes - No acute concerns. K 4.2     6. Acid base - No acute concerns. Bicarb 20   - Continue Bicarb 650 mg bid     7. BMD - Ca 8.9, Phos 3.2, albumin 3.4   - Vit D 31 (9/18)   -  (9/18)   -  Will start Calcitriol 0.25 mcq qd     8. Anemia - Hgb 10.3.    - Enrolled in Anemia management   - On iron bid   - Iron studies 9/18:  Ferritin 250, IS 36   - Will check on last colonoscopy     9. Disposition - RTC one month for f/u.      Assessment and plan was discussed with patient and he voiced his understanding and agreement.     Reason for Visit:  CKD4/HTN f/u     HPI:  Mr Walker is a 74 yo male with CKD4, HTN, DM2, MGUS, previous CVA, in today for routine CKD f/u. Last seen by me on 8/22/18 and I had questions regarding his medications at that time. Baseline creat in the upper 2-3 range since 8/16. He is non proteinuric.      Interval Hx:   No hospital admissions  Being followed by Urology for urinary frequency, incontinence. No retention.      ROS:  Major complaint is ongoing severe urinary frequency/incontinence. Additional agent added by Urology for treatment. Patient has held his Lasix for one week w/o much improvement in his voiding pattern, however, he really has no significant edema. I suspect he frequently self adjusts his diuretic regimen given the frequency/incontinence. Denies dyspnea, CP, abdominal pain. Appetite is OK but only eats one meal/day.      Chronic Health Problems:     CKD4  DM2  H/O CVA  HTN  MGUS  Anemia  PRINCE on CPAP  Severe Urinary frequency/incontinence  HLD     Personal Hx:   Single, lives alone in own home. Sets up own meds. Does not drive, NS, ETOH none, uses walker      Allergies:  Allergies   Allergen Reactions     Lisinopril      Other reaction(s): Renal Failure     Armodafinil Rash       Medications:  Prior to Admission medications    Medication Sig Start Date End Date Taking? Authorizing Provider   ASPIRIN EC PO Take 81 mg by mouth daily    Yes Unknown, Entered By History   atorvastatin (LIPITOR) 20 MG tablet Take 20 mg by mouth every evening 1700 5/9/14  Yes Reported, Patient   blood glucose monitoring (NOVA SUREFLEX) lancets 3x/day with blood glucose testing 6/3/08  Yes Reported, Patient   darbepoetin aaliyah-polysorbate (ARANESP, ALBUMIN FREE,) 300 MCG/ML SOLN Inject 300 mcg Subcutaneous every 14 days As  needed for hgb <=11g/dL   Yes Reported, Patient   ferrous sulfate 325 (65 FE) MG tablet Take 325 mg by mouth 2 times daily  2/14/14  Yes Reported, Patient   fluticasone (FLONASE) 50 MCG/ACT nasal spray Spray 1 spray into both nostrils 2 times daily as needed  6/24/14  Yes Reported, Patient   furosemide (LASIX) 20 MG tablet Take 1 tablet (20 mg) by mouth 2 times daily 9/25/18  Yes Omega, Tiny Fields, NP   gabapentin (NEURONTIN) 300 MG capsule Take 600 mg by mouth 2 times daily   Yes Reported, Patient   insulin aspart (NOVOLOG PEN) 100 UNIT/ML injection Inject subcutaneous 3 times daily with meals and bedtime Medium Scale less than 70 .................. Refer to Hypoglycemia Treatment Protocol 70 - 149 ......................... No corrective insulin 150 - 199 ....................... 2 units 200 - 249 ....................... 4 units 250 - 299 ....................... 6 units 300 - 349 ....................... 8 units 350 or greater ................ 10 units and notify MD   3/24/17  Yes Reported, Patient   insulin detemir (LEVEMIR FLEXPEN/FLEXTOUCH) 100 UNIT/ML soln Inject 10 Units Subcutaneous 2 times daily Dose was originally 50 units BID but pt has been cutting back  Patient taking differently: Inject 30 Units Subcutaneous 2 times daily Dose was originally 50 units BID but pt has been cutting back 2/13/15  Yes Ra Lehman MD   insulin pen needle (B-D U/F) 31G X 8 MM USE AS DIRECTED 1/9/17  Yes Reported, Patient   loratadine (CLARITIN) 10 MG tablet Take 10 mg by mouth daily   Yes Reported, Patient   losartan (COZAAR) 50 MG tablet Take 1 tablet (50 mg) by mouth daily 9/25/18  Yes Tiny Duff NP   metoprolol tartrate (LOPRESSOR) 25 MG tablet TAKE 1 TABLET(25 MG) BY MOUTH TWICE DAILY 9/25/18  Yes Tiny Duff NP   minoxidil (LONITEN) 2.5 MG tablet Take 1 tablet (2.5 mg) by mouth daily 9/25/18  Yes Tiny Duff NP   mirabegron (MYRBETRIQ) 25 MG 24 hr tablet Take 1 tablet (25 mg) by  "mouth daily 9/10/18  Yes Karlene Odom PA-C   Misc Natural Products (OSTEO BI-FLEX ADV TRIPLE ST PO) Take by mouth daily   Yes Reported, Patient   omeprazole (PRILOSEC) 20 MG capsule Take 20 mg by mouth daily  6/30/14  Yes Reported, Patient   sodium bicarbonate 650 MG tablet Take 1 tablet (650 mg) by mouth 2 times daily 4/16/15  Yes Sohn Vyas MD   solifenacin (VESICARE) 5 MG tablet Take 1 tablet (5 mg) by mouth daily 4/9/18  Yes Karlene Odom PA-C   UNABLE TO FIND Take by mouth daily MEDICATION NAME: Brain Peak   Yes Reported, Patient   Multiple Vitamin (MULTI-VITAMINS) TABS Take 1 tablet by mouth daily  2/14/14   Reported, Patient       Vitals:  /63  Temp 98  F (36.7  C) (Oral)  Ht 1.803 m (5' 11\")  Wt 91.7 kg (202 lb 3.2 oz)  SpO2 99%  BMI 28.2 kg/m2    Exam:  GEN: Elderly male in NAD.   CARDIAC: RRR  LUNGS: CTA  ABDOMEN: Soft, NT  EXT: tr edema    Results:  Results for NATALIE DE LA CRUZ (MRN [id number]) as of 9/26/2018 09:49   Ref. Range 9/25/2018 13:20 9/25/2018 13:57   Sodium Latest Ref Range: 133 - 144 mmol/L 138    Potassium Latest Ref Range: 3.4 - 5.3 mmol/L 4.2    Chloride Latest Ref Range: 94 - 109 mmol/L 110 (H)    Carbon Dioxide Latest Ref Range: 20 - 32 mmol/L 20    Urea Nitrogen Latest Ref Range: 7 - 30 mg/dL 47 (H)    Creatinine Latest Ref Range: 0.66 - 1.25 mg/dL 2.82 (H)    GFR Estimate Latest Ref Range: >60 mL/min/1.7m2 22 (L)    GFR Estimate If Black Latest Ref Range: >60 mL/min/1.7m2 27 (L)    Calcium Latest Ref Range: 8.5 - 10.1 mg/dL 8.9    Anion Gap Latest Ref Range: 3 - 14 mmol/L 9    Phosphorus Latest Ref Range: 2.5 - 4.5 mg/dL 3.2    Albumin Latest Ref Range: 3.4 - 5.0 g/dL 3.4    Creatinine Urine Latest Units: mg/dL  84   Ferritin Latest Ref Range: 26 - 388 ng/mL 250    Iron Latest Ref Range: 35 - 180 ug/dL 70    Iron Binding Cap Latest Ref Range: 240 - 430 ug/dL 195 (L)    Iron Saturation Index Latest Ref Range: 15 - 46 % 36    Protein Random Urine Latest " Units: g/L  2.29   Protein Total Urine g/gr Creatinine Latest Ref Range: 0 - 0.2 g/g Cr  2.74 (H)   Transferrin Latest Ref Range: 210 - 360 mg/dL 153 (L)    Vitamin D Deficiency screening Latest Ref Range: 20 - 75 ug/L 31    Glucose Latest Ref Range: 70 - 99 mg/dL 222 (H)    WBC Latest Ref Range: 4.0 - 11.0 10e9/L 4.1    Hemoglobin Latest Ref Range: 13.3 - 17.7 g/dL 10.3 (L)    Hematocrit Latest Ref Range: 40.0 - 53.0 % 30.1 (L)    Platelet Count Latest Ref Range: 150 - 450 10e9/L 191    RBC Count Latest Ref Range: 4.4 - 5.9 10e12/L 3.16 (L)    MCV Latest Ref Range: 78 - 100 fl 95    MCH Latest Ref Range: 26.5 - 33.0 pg 32.6    MCHC Latest Ref Range: 31.5 - 36.5 g/dL 34.2    RDW Latest Ref Range: 10.0 - 15.0 % 12.9    Parathyroid Hormone Intact Latest Ref Range: 18 - 80 pg/mL 559 (H)                    Tiny Duff, NP

## 2018-09-26 ENCOUNTER — CARE COORDINATION (OUTPATIENT)
Dept: NEPHROLOGY | Facility: CLINIC | Age: 73
End: 2018-09-26

## 2018-09-26 RX ORDER — CALCITRIOL 0.25 UG/1
0.25 CAPSULE, LIQUID FILLED ORAL DAILY
Qty: 90 CAPSULE | Refills: 0 | Status: SHIPPED | OUTPATIENT
Start: 2018-09-26 | End: 2018-12-19

## 2018-09-26 NOTE — PROGRESS NOTES
Nephrology Clinic Visit 9/25/18    Assessment and Plan:      1. Medication questions - Patient brought in his medications today and we were able to clean up his list.      2. CKD4, non proteinuric - Creat 2.8 today, eGFR 22 ml/mn. No retention found during Urology evaluation in Sept 2018. On vesicare, just added Mirabegron by Urology.   Etiology of CKD is bx proven DM/HTN related CKD   - No uremic symptoms   - Has been relatively stable since 2016   - Would refer to [facility] if renal function begins to decline more rapidly   - Does not use NSAIDs   - On renal dosing for Vesicare     3. Volume status - Close to euvolemia. Has only trace edema. No dyspnea. Albumin nml. Weight 91.7 kg, despite holding diuretics x 1 wk. Was 92.1 kg last visit. 's/ x 3. On Lasix 80 mg Am, 60 mg PM.     - Given severe urinary problems and goal to wean off Minoxidil, will decrease to 20 mg bid.      4. HTN - Well controlled. Clinic blood pressures today; 126-128/63-68 on the following: Home blood pressures in the 130/   Lasix 80/60, but has been held by patient x 1 wk   Losartan 100 mg every day was ordered, but was not taking   Lopressor 25 mg bid   Minoxidil 5.0 mg every day  - Will change to the following:     Minoxidil 2.5 mg every day ( will go to [facility] next visit)    Lasix 20 mg bid    Losartan 50 mg every day    Continue Lopressor at 25 mg bid  - Will check chemistry labs in 2 weeks   - Will have my nurse call regarding blood pressures in one week   - Continue daily blood pressure monitoring     5. Electrolytes - No acute concerns. K 4.2     6. Acid base - No acute concerns. Bicarb 20   - Continue Bicarb 650 mg bid     7. BMD - Ca 8.9, Phos 3.2, albumin 3.4   - Vit D 31 (9/18)   -  (9/18)   - Will start Calcitriol 0.25 mcq qd     8. Anemia - Hgb 10.3.    - Enrolled in Anemia management   - On iron bid   -Iron studies 9/18: Ferritin 250, IS 36   - Will check on last colonoscopy     9. Disposition - RTC one month for  f/u.      Assessment and plan was discussed with patient and he voiced his understanding and agreement.     Reason for Visit:  CKD4/HTN f/u     HPI:  Mr Walker is a 74 yo male with CKD4, HTN, DM2, MGUS, previous CVA, in today for routine CKD f/u. Last seen by me on 8/22/18 and I had questions regarding his medications at that time. Baseline creat in the upper 2-3 range since 8/16. He is non proteinuric.      Interval Hx:   No hospital admissions  Being followed by Urology for urinary frequency, incontinence. No retention.      ROS:  Major complaint is ongoing severe urinary frequency/incontinence. Additional agent added by Urology for treatment. Patient has held his Lasix for one week w/o much improvement in his voiding pattern, however, he really has no significant edema. I suspect he frequently self adjusts his diuretic regimen given the frequency/incontinence. Denies dyspnea, CP, abdominal pain. Appetite is OK but only eats one meal/day.      Chronic Health Problems:     CKD4  DM2  H/O CVA  HTN  MGUS  Anemia  PRINCE on CPAP  Severe Urinary frequency/incontinence  HLD     Personal Hx:   Single, lives alone in own home. Sets up own meds. Does not drive, NS, ETOH none, uses walker      Allergies:  Allergies   Allergen Reactions     Lisinopril      Other reaction(s): Renal Failure     Armodafinil Rash       Medications:  Prior to Admission medications    Medication Sig Start Date End Date Taking? Authorizing Provider   ASPIRIN EC PO Take 81 mg by mouth daily    Yes Unknown, Entered By History   atorvastatin (LIPITOR) 20 MG tablet Take 20 mg by mouth every evening 1700 5/9/14  Yes Reported, Patient   blood glucose monitoring (NOVA SUREFLEX) lancets 3x/day with blood glucose testing 6/3/08  Yes Reported, Patient   darbepoetin aaliyah-polysorbate (ARANESP, ALBUMIN FREE,) 300 MCG/ML SOLN Inject 300 mcg Subcutaneous every 14 days As needed for hgb <=11g/dL   Yes Reported, Patient   ferrous sulfate 325 (65 FE) MG tablet Take  325 mg by mouth 2 times daily  2/14/14  Yes Reported, Patient   fluticasone (FLONASE) 50 MCG/ACT nasal spray Spray 1 spray into both nostrils 2 times daily as needed  6/24/14  Yes Reported, Patient   furosemide (LASIX) 20 MG tablet Take 1 tablet (20 mg) by mouth 2 times daily 9/25/18  Yes Tiny Duff NP   gabapentin (NEURONTIN) 300 MG capsule Take 600 mg by mouth 2 times daily   Yes Reported, Patient   insulin aspart (NOVOLOG PEN) 100 UNIT/ML injection Inject subcutaneous 3 times daily with meals and bedtime Medium Scale less than 70 .................. Refer to Hypoglycemia Treatment Protocol 70 - 149 ......................... No corrective insulin 150 - 199 ....................... 2 units 200 - 249 ....................... 4 units 250 - 299 ....................... 6 units 300 - 349 ....................... 8 units 350 or greater ................ 10 units and notify MD   3/24/17  Yes Reported, Patient   insulin detemir (LEVEMIR FLEXPEN/FLEXTOUCH) 100 UNIT/ML soln Inject 10 Units Subcutaneous 2 times daily Dose was originally 50 units BID but pt has been cutting back  Patient taking differently: Inject 30 Units Subcutaneous 2 times daily Dose was originally 50 units BID but pt has been cutting back 2/13/15  Yes Ra Lehman MD   insulin pen needle (B-D U/F) 31G X 8 MM USE AS DIRECTED 1/9/17  Yes Reported, Patient   loratadine (CLARITIN) 10 MG tablet Take 10 mg by mouth daily   Yes Reported, Patient   losartan (COZAAR) 50 MG tablet Take 1 tablet (50 mg) by mouth daily 9/25/18  Yes Tiny Duff NP   metoprolol tartrate (LOPRESSOR) 25 MG tablet TAKE 1 TABLET(25 MG) BY MOUTH TWICE DAILY 9/25/18  Yes Tiny Duff NP   minoxidil (LONITEN) 2.5 MG tablet Take 1 tablet (2.5 mg) by mouth daily 9/25/18  Yes Tiny Duff NP   mirabegron (MYRBETRIQ) 25 MG 24 hr tablet Take 1 tablet (25 mg) by mouth daily 9/10/18  Yes Karlene Odom PA-C   [facility] (OSTEO BI-FLEX ADV  "TRIPLE ST PO) Take by mouth daily   Yes Reported, Patient   omeprazole (PRILOSEC) 20 MG capsule Take 20 mg by mouth daily  6/30/14  Yes Reported, Patient   sodium bicarbonate 650 MG tablet Take 1 tablet (650 mg) by mouth 2 times daily 4/16/15  Yes Shon Vyas MD   solifenacin (VESICARE) 5 MG tablet Take 1 tablet (5 mg) by mouth daily 4/9/18  Yes Karlene Odom PA-C   UNABLE TO FIND Take by mouth daily MEDICATION NAME: Brain Peak   Yes Reported, Patient   Multiple Vitamin (MULTI-VITAMINS) TABS Take 1 tablet by mouth daily  2/14/14   Reported, Patient       Vitals:  /63  Temp 98  F (36.7  C) (Oral)  Ht 1.803 m (5' 11\")  Wt 91.7 kg (202 lb 3.2 oz)  SpO2 99%  BMI 28.2 kg/m2    Exam:  GEN: Elderly male in NAD.   CARDIAC: RRR  LUNGS: CTA  ABDOMEN: Soft, NT  EXT: tr edema    Results:  Results for NATALIE DE LA CRUZ (MRN [id number]) as of 9/26/2018 09:49   Ref. Range 9/25/2018 13:20 9/25/2018 13:57   Sodium Latest Ref Range: 133 - 144 mmol/L 138    Potassium Latest Ref Range: 3.4 - 5.3 mmol/L 4.2    Chloride Latest Ref Range: 94 - 109 mmol/L 110 (H)    Carbon Dioxide Latest Ref Range: 20 - 32 mmol/L 20    Urea Nitrogen Latest Ref Range: 7 - 30 mg/dL 47 (H)    Creatinine Latest Ref Range: 0.66 - 1.25 mg/dL 2.82 (H)    GFR Estimate Latest Ref Range: >60 mL/min/1.7m2 22 (L)    GFR Estimate If Black Latest Ref Range: >60 mL/min/1.7m2 27 (L)    Calcium Latest Ref Range: 8.5 - 10.1 mg/dL 8.9    Anion Gap Latest Ref Range: 3 - 14 mmol/L 9    Phosphorus Latest Ref Range: 2.5 - 4.5 mg/dL 3.2    Albumin Latest Ref Range: 3.4 - 5.0 g/dL 3.4    Creatinine Urine Latest Units: mg/dL  84   Ferritin Latest Ref Range: 26 - 388 ng/mL 250    Iron Latest Ref Range: 35 - 180 ug/dL 70    Iron Binding Cap Latest Ref Range: 240 - 430 ug/dL 195 (L)    Iron Saturation Index Latest Ref Range: 15 - 46 % 36    Protein Random Urine Latest Units: g/L  2.29   Protein Total Urine g/gr Creatinine Latest Ref Range: 0 - 0.2 g/g Cr  2.74 " (H)   Transferrin Latest Ref Range: 210 - 360 mg/dL 153 (L)    Vitamin D Deficiency screening Latest Ref Range: 20 - 75 ug/L 31    Glucose Latest Ref Range: 70 - 99 mg/dL 222 (H)    WBC Latest Ref Range: 4.0 - 11.0 10e9/L 4.1    Hemoglobin Latest Ref Range: 13.3 - 17.7 g/dL 10.3 (L)    Hematocrit Latest Ref Range: 40.0 - 53.0 % 30.1 (L)    Platelet Count Latest Ref Range: 150 - 450 10e9/L 191    RBC Count Latest Ref Range: 4.4 - 5.9 10e12/L 3.16 (L)    MCV Latest Ref Range: 78 - 100 fl 95    MCH Latest Ref Range: 26.5 - 33.0 pg 32.6    MCHC Latest Ref Range: 31.5 - 36.5 g/dL 34.2    RDW Latest Ref Range: 10.0 - 15.0 % 12.9    Parathyroid Hormone Intact Latest Ref Range: 18 - 80 pg/mL 559 (H)

## 2018-09-26 NOTE — PROGRESS NOTES
Reason for Call    Called patient to follow up after his appointment yesterday. Per Sheryl:    Diomedes Pa, could you let brennon know that I have sent a Rx for Rocaltrol to treat his secondary hyperparathyroidism. PTH quite elevated. I'll d/w him further at next visit.   Also, I have changed his antihypertensive regimen to the following:   Lasix 20 mg bid, Minoxidil 2.5 mg every day, Losartan 50 mg every day and Lopressor 25 mg bid. Please call him in one week for b/p check.   Plus! Could you have him get labs in 2 wks? Order is in   Think that's it!   Thank you     Reviewed with patient who verbalized understanding. I will follow up with him in one week to check on BP. Labs in two weeks; patient would prefer to have them done at [facility]. Order faxed to [phone].    Plan    1. Start Rocaltrol   2. Monitor BP at home; we will call in one week to review  3. Labs in 2 weeks    Patient was given an opportunity to ask questions and have those questions answered to his satisfaction.  Patient verbalized understanding of instructions provided and agreed to plan of care.    Thierno Mace, RN, BAN  Nephrology RN Care Coordinator

## 2018-10-02 DIAGNOSIS — N39.41 URGE INCONTINENCE: ICD-10-CM

## 2018-10-02 RX ORDER — SOLIFENACIN SUCCINATE 5 MG/1
5 TABLET, FILM COATED ORAL DAILY
Qty: 30 TABLET | Refills: 11 | Status: SHIPPED | OUTPATIENT
Start: 2018-10-02 | End: 2019-02-22

## 2018-10-03 ENCOUNTER — CARE COORDINATION (OUTPATIENT)
Dept: NEPHROLOGY | Facility: CLINIC | Age: 73
End: 2018-10-03

## 2018-10-03 NOTE — PROGRESS NOTES
Reason for Call    Called patient to review his BP readings over the last weeks since making adjustments to his medication during appointment last week.  Patient says he has not been taking his BP. He will start checking them.   Also reminded him about getting labs checked next week. Called [facility] and confirmed they received the lab order. I will follow up at that time to review lab results and BP readings.    Thierno Mace RN  Nephrology RN Care Coordinator

## 2018-10-05 ENCOUNTER — TRANSFERRED RECORDS (OUTPATIENT)
Dept: HEALTH INFORMATION MANAGEMENT | Facility: CLINIC | Age: 73
End: 2018-10-05

## 2018-10-15 ENCOUNTER — OFFICE VISIT (OUTPATIENT)
Dept: UROLOGY | Facility: CLINIC | Age: 73
End: 2018-10-15
Payer: COMMERCIAL

## 2018-10-15 ENCOUNTER — CARE COORDINATION (OUTPATIENT)
Dept: NEPHROLOGY | Facility: CLINIC | Age: 73
End: 2018-10-15

## 2018-10-15 VITALS
SYSTOLIC BLOOD PRESSURE: 138 MMHG | WEIGHT: 202 LBS | HEIGHT: 71 IN | DIASTOLIC BLOOD PRESSURE: 68 MMHG | BODY MASS INDEX: 28.28 KG/M2 | HEART RATE: 69 BPM

## 2018-10-15 DIAGNOSIS — R35.0 URINARY FREQUENCY: ICD-10-CM

## 2018-10-15 DIAGNOSIS — N18.4 CKD (CHRONIC KIDNEY DISEASE) STAGE 4, GFR 15-29 ML/MIN (H): ICD-10-CM

## 2018-10-15 DIAGNOSIS — D63.1 ANEMIA OF CHRONIC RENAL FAILURE, STAGE 4 (SEVERE) (H): ICD-10-CM

## 2018-10-15 DIAGNOSIS — N39.41 URGE INCONTINENCE: Primary | ICD-10-CM

## 2018-10-15 DIAGNOSIS — N18.4 ANEMIA OF CHRONIC RENAL FAILURE, STAGE 4 (SEVERE) (H): ICD-10-CM

## 2018-10-15 DIAGNOSIS — I10 ESSENTIAL HYPERTENSION: ICD-10-CM

## 2018-10-15 LAB
ALBUMIN SERPL-MCNC: 3.5 G/DL (ref 3.4–5)
ANION GAP SERPL CALCULATED.3IONS-SCNC: 10 MMOL/L (ref 3–14)
BASOPHILS # BLD AUTO: 0 10E9/L (ref 0–0.2)
BASOPHILS NFR BLD AUTO: 0.5 %
BUN SERPL-MCNC: 56 MG/DL (ref 7–30)
CALCIUM SERPL-MCNC: 8.4 MG/DL (ref 8.5–10.1)
CHLORIDE SERPL-SCNC: 103 MMOL/L (ref 94–109)
CO2 SERPL-SCNC: 21 MMOL/L (ref 20–32)
CREAT SERPL-MCNC: 3.48 MG/DL (ref 0.66–1.25)
CREAT UR-MCNC: 94 MG/DL
DEPRECATED CALCIDIOL+CALCIFEROL SERPL-MC: 29 UG/L (ref 20–75)
DIFFERENTIAL METHOD BLD: ABNORMAL
EOSINOPHIL # BLD AUTO: 0.1 10E9/L (ref 0–0.7)
EOSINOPHIL NFR BLD AUTO: 2.1 %
ERYTHROCYTE [DISTWIDTH] IN BLOOD BY AUTOMATED COUNT: 12.7 % (ref 10–15)
FERRITIN SERPL-MCNC: 345 NG/ML (ref 26–388)
GFR SERPL CREATININE-BSD FRML MDRD: 17 ML/MIN/1.7M2
GLUCOSE SERPL-MCNC: 348 MG/DL (ref 70–99)
HCT VFR BLD AUTO: 33.8 % (ref 40–53)
HGB BLD-MCNC: 11.5 G/DL (ref 13.3–17.7)
IMM GRANULOCYTES # BLD: 0 10E9/L (ref 0–0.4)
IMM GRANULOCYTES NFR BLD: 0 %
IRON SATN MFR SERPL: 44 % (ref 15–46)
IRON SERPL-MCNC: 87 UG/DL (ref 35–180)
LYMPHOCYTES # BLD AUTO: 1.9 10E9/L (ref 0.8–5.3)
LYMPHOCYTES NFR BLD AUTO: 43.7 %
MCH RBC QN AUTO: 31.7 PG (ref 26.5–33)
MCHC RBC AUTO-ENTMCNC: 34 G/DL (ref 31.5–36.5)
MCV RBC AUTO: 93 FL (ref 78–100)
MICROALBUMIN UR-MCNC: 1540 MG/L
MICROALBUMIN/CREAT UR: 1641.79 MG/G CR (ref 0–17)
MONOCYTES # BLD AUTO: 0.4 10E9/L (ref 0–1.3)
MONOCYTES NFR BLD AUTO: 8.4 %
NEUTROPHILS # BLD AUTO: 1.9 10E9/L (ref 1.6–8.3)
NEUTROPHILS NFR BLD AUTO: 45.3 %
NRBC # BLD AUTO: 0 10*3/UL
NRBC BLD AUTO-RTO: 0 /100
PHOSPHATE SERPL-MCNC: 4.2 MG/DL (ref 2.5–4.5)
PLATELET # BLD AUTO: 210 10E9/L (ref 150–450)
POTASSIUM SERPL-SCNC: 4.2 MMOL/L (ref 3.4–5.3)
PROT UR-MCNC: 2 G/L
PROT/CREAT 24H UR: 2.13 G/G CR (ref 0–0.2)
PTH-INTACT SERPL-MCNC: 868 PG/ML (ref 18–80)
RBC # BLD AUTO: 3.63 10E12/L (ref 4.4–5.9)
SODIUM SERPL-SCNC: 134 MMOL/L (ref 133–144)
TIBC SERPL-MCNC: 195 UG/DL (ref 240–430)
WBC # BLD AUTO: 4.3 10E9/L (ref 4–11)

## 2018-10-15 ASSESSMENT — PAIN SCALES - GENERAL: PAINLEVEL: NO PAIN (0)

## 2018-10-15 NOTE — MR AVS SNAPSHOT
After Visit Summary   10/15/2018    Austin Walker    MRN: [id number]           Patient Information     Date Of Birth          1945        Visit Information        Provider Department      10/15/2018 12:30 PM Karlene Odom PA-C [facility] Urology and [facility] for Prostate and Urologic Cancers        Today's Diagnoses     Urge incontinence    -  1    Urinary frequency          Care Instructions    UROLOGY CLINIC VISIT PATIENT INSTRUCTIONS    1) Follow up with me (Karlene Odom PA-C) in 3 months.    If you have any issues, questions or concerns in the meantime, do not hesitate to contact us at [phone] or via [employer].     It was a pleasure meeting with you today.  Thank you for allowing me and my team the privilege of caring for you today.  YOU are the reason we are here, and I truly hope we provided you with the excellent service you deserve.  Please let us know if there is anything else we can do for you so that we can be sure you are leaving completely satisfied with your care experience.                Follow-ups after your visit        Your next 10 appointments already scheduled     Oct 15, 2018 12:30 PM CDT   (Arrive by 12:15 PM)   Return Visit with Karlene Odom PA-C   [facility] Urology and [facility] for Prostate and Urologic Cancers ([facility])    [address] [place] [address]  [address] [facility] [address] [phone]            Oct 15, 2018  4:15 PM CDT   LAB with [facility] ([facility])    [address] [place] [id number] [phone]           Please do not eat 10-12 hours before your appointment if you are coming in fasting for labs on lipids, cholesterol, or glucose (sugar). This does not apply to pregnant women. Water, hot tea and black coffee (with nothing added) are okay. Do not drink other fluids, diet soda or chew gum.            Oct 23, 2018  1:30 PM CDT   Lab with  "[employer]   [facility]  "Lab ([facility])    [address]  1st Floor  [facility] [id number] [phone]            Oct 23, 2018  2:30 PM CDT   (Arrive by 2:00 PM)   Return Visit with Tiny Duff NP   [facility] ([facility])    [address]  [address]  [facility] [id number] [phone]              Who to contact     Please call your clinic at [phone] to:    Ask questions about your health    Make or cancel appointments    Discuss your medicines    Learn about your test results    Speak to your doctor            Additional Information About Your Visit        WUT Information     WUT gives you secure access to your electronic health record. If you see a primary care provider, you can also send messages to your care team and make appointments. If you have questions, please call your primary care clinic.  If you do not have a primary care provider, please call [phone] and they will assist you.      WUT is an electronic gateway that provides easy, online access to your medical records. With WUT, you can request a clinic appointment, read your test results, renew a prescription or communicate with your care team.     To access your existing account, please contact your [facility] or call [phone] for assistance.        Care EveryWhere ID     This is your Care EveryWhere ID. This could be used by other organizations to access your [facility] medical records  [id number]        Your Vitals Were     Pulse Height BMI (Body Mass Index)             69 1.803 m (5' 11\") 28.17 kg/m2          Blood Pressure from Last 3 Encounters:   10/15/18 138/68   09/25/18 126/63   09/12/18 146/63    Weight from Last 3 Encounters:   10/15/18 91.6 kg (202 lb)   09/25/18 91.7 kg (202 lb 3.2 oz)   09/12/18 100.7 kg (222 lb)              Today, you had the following     No orders found for display         Today's " Medication Changes          These changes are accurate as of 10/15/18 11:55 AM.  If you have any questions, ask your nurse or doctor.               These medicines have changed or have updated prescriptions.        Dose/Directions    insulin detemir 100 UNIT/ML injection   Commonly known as:  LEVEMIR FLEXPEN/FLEXTOUCH   This may have changed:    - how much to take  - additional instructions   Used for:  Diabetes mellitus, type 2 (H)        Dose:  10 Units   Inject 10 Units Subcutaneous 2 times daily Dose was originally 50 units BID but pt has been cutting back   Refills:  0                Primary Care Provider Office Phone # Fax #    Celso Esparza [phone]       [facility] [address]  [place] [id number]        Equal Access to Services     ENRRIQUE LACY : Hadii lili jasmine hadphoenixo Socarl, waaxda luqadaha, qaybta kaalmada adeegyada, awilda bethea . So [facility] [phone].    ATENCIÓN: Si habla español, tiene a schaefer disposición servicios gratuitos de asistencia lingüística. [facility] [phone].    We comply with applicable federal civil rights laws and [place] laws. We do not discriminate on the basis of race, color, national origin, age, disability, sex, sexual orientation, or gender identity.            Thank you!     Thank you for choosing [facility] UROLOGY AND [facility] FOR PROSTATE AND UROLOGIC CANCERS  for your care. Our goal is always to provide you with excellent care. Hearing back from our patients is one way we can continue to improve our services. Please take a few minutes to complete the written survey that you may receive in the mail after your visit with us. Thank you!             Your Updated Medication List - Protect others around you: Learn how to safely use, store and throw away your medicines at www.disposemymeds.org.          This list is accurate as of 10/15/18 11:55 AM.  Always use your most recent med list.                    Brand Name Dispense Instructions for use Diagnosis    ARANESP (ALBUMIN FREE) 300 MCG/ML Soln   Generic drug:  darbepoetin aaliyah-polysorbate      Inject 300 mcg Subcutaneous every 14 days As needed for hgb <=11g/dL        ASPIRIN EC PO      Take 81 mg by mouth daily        atorvastatin 20 MG tablet    LIPITOR     Take 20 mg by mouth every evening 1700        B-D U/F 31G X 8 MM   Generic drug:  insulin pen needle      USE AS DIRECTED        blood glucose monitoring lancets      3x/day with blood glucose testing        calcitRIOL 0.25 MCG capsule    ROCALTROL    90 capsule    Take 1 capsule (0.25 mcg) by mouth daily for 28 days    Secondary renal hyperparathyroidism (H)       ferrous sulfate 325 (65 Fe) MG tablet    IRON     Take 325 mg by mouth 2 times daily        FLONASE 50 MCG/ACT spray   Generic drug:  fluticasone      Spray 1 spray into both nostrils 2 times daily as needed        furosemide 20 MG tablet    LASIX    30 tablet    Take 1 tablet (20 mg) by mouth 2 times daily        gabapentin 300 MG capsule    NEURONTIN     Take 600 mg by mouth 2 times daily        insulin aspart 100 UNIT/ML injection    NovoLOG PEN     Inject subcutaneous 3 times daily with meals and bedtime?Medium Scale?less than 70 .................. Refer to Hypoglycemia?Treatment Protocol?70 - 149 ......................... No corrective insulin?150 - 199 ....................... 2 units?200 - 249 ....................... 4 units?250 - 299 ....................... 6 units?300 - 349 ....................... 8 units?350 or greater ................ 10 units and notify MD        insulin detemir 100 UNIT/ML injection    LEVEMIR FLEXPEN/FLEXTOUCH     Inject 10 Units Subcutaneous 2 times daily Dose was originally 50 units BID but pt has been cutting back    Diabetes mellitus, type 2 (H)       loratadine 10 MG tablet    CLARITIN     Take 10 mg by mouth daily        losartan 50 MG tablet    COZAAR    90 tablet    Take 1 tablet (50 mg) by mouth daily     Essential hypertension       metoprolol tartrate 25 MG tablet    LOPRESSOR    180 tablet    TAKE 1 TABLET(25 MG) BY MOUTH TWICE DAILY    Essential hypertension       minoxidil 2.5 MG tablet    LONITEN    180 tablet    Take 1 tablet (2.5 mg) by mouth daily    CKD (chronic kidney disease) stage 4, GFR 15-29 ml/min (H)       mirabegron 25 MG 24 hr tablet    MYRBETRIQ    30 tablet    Take 1 tablet (25 mg) by mouth daily    Urge incontinence       MULTI-VITAMINS Tabs      Take 1 tablet by mouth daily        omeprazole 20 MG CR capsule    priLOSEC     Take 20 mg by mouth daily        order for DME      4 wheeled walker with brakes and a seat.        OSTEO BI-FLEX ADV TRIPLE ST PO      Take by mouth daily        sodium bicarbonate 650 MG tablet     120 tablet    Take 1 tablet (650 mg) by mouth 2 times daily    CKD (chronic kidney disease)       solifenacin 5 MG tablet    VESICARE    30 tablet    Take 1 tablet (5 mg) by mouth daily    Urge incontinence       UNABLE TO FIND      Take by mouth daily MEDICATION NAME: Brain Peak

## 2018-10-15 NOTE — PROGRESS NOTES
Reason for Call    Called patient to follow up on his home BP readings and see if he went to get labs drawn last week.    He says he has not had his labs checked, but will get them done today when he's here for another appointment.    He reports his blood pressures have been elevated with most readings being 180s/80s. He did not have specific readings at the time of our call. Pulse in the 60s.     I reviewed his blood pressure medications. He said he is NOT taking furosemide due to urinary frequency/ incontinence. Stopped taking this about a week ago. He reports taking losartan, metoprolol, and minoxidil as prescribed.     Collaboration    Update forwarded to Sheryl Mike NP to review and advise.    Thierno Mace, RN, BAN  Nephrology RN Care Coordinator

## 2018-10-15 NOTE — LETTER
"10/15/2018       RE: Austin Walker  [address] [facility] [address]     Dear Colleague,    Thank you for referring your patient, Austin Walker, to the [facility] UROLOGY AND INST FOR PROSTATE AND UROLOGIC CANCERS at [facility]. Please see a copy of my visit note below.    UROLOGY OFFICE VISIT - FOLLOW UP    REASON FOR VISIT: follow up urinary frequency, urgency, incontinence    HPI: Mr. Austin Walker is a 73 year old male with a PMH of CKD stage 4 with proteinuria 2/2 DM and HTN (awaiting transplant listing, not currently on dialysis), MGUS (follows with [facility]), PRINCE, and h/o CVA who returns to the urology clinic for follow up of urinary frequency, urgency, and incontinence. Seen in initial consultation on 2/12/18 - please see consult note from this date for full history. In brief, he has had bothersome urinary symptoms for the past 3 years, progressively worsening. Incontinence started after he began taking Lasix (80 mg in the AM, 60 mg in the PM, ~2pm). He has multiple episodes of incontinence per day - goes through 4-6 Depends briefs per day. Leakage mainly occurs during the day, rarely overnight. Gets up 0-1 times per night to void. Will leak when he goes from supine or sitting to standing. States that urine just \"pours out.\" Also reports significant urgency and urge incontinence. No h/o UTI's or kidney stones. Negative UA through primary care office in January 2018. No hematuria, dysuria, needing to strain to void. Subjectively, force of stream is \"very strong.\" Occasional sensation of incomplete emptying but PVR at last office visit was 11 mL. Normally sits to void. Was previously drinking 1 pot of coffee per day, 2 glasses of diet ginger ale, 1 glass of OJ in the AM. Initially, he was recommended to limit bladder irritants and complete bladder diaries. At a follow up appointment on 4/9/18, he reported decreasing coffee consumption from 1 pot per day " "to 1 cup per day. Despite this, he had not noticed any improvement in urinary symptoms. In fact, he thought things may be getting worse with more frequent nighttime incontinence now than ever before. He was therefore started on Vesicare 5 mg daily (renally dosed) and asked to complete another bladder diary. At a follow up appointment, he reported \"night and day difference\" in his symptoms with less urgency and less incontinence. He did continue to have some incontinence, but it was much less volume then previously. He wondered about increasing the dose of Vesicare as the effects seem to wear off toward the end of the day. Dose increase was initially attempted but is contraindicated given CrCl <30. He therefore continued on Vesicare 5 mg once daily. At another follow up on 9/10/18, he complained that symptoms were getting worse. He was therefore started on Myrbetriq 25 mg daily and urodynamics were completed on 9/12/18 (results below). At that time, he elected to continue with dual therapy with Myrbetriq and Vesicare rather than proceeding with third line treatments (bladder Botox, Interstim, possible outlet procedure, etc.).     He returns today for follow up and reports significant improvement in his urinary symptoms. He is now getting up 1-2 times at night to void, down from 3-4. No longer having incontinence. From review of recent nephrology office visit note, Lasix was changed from 80/60 to 20 mg BID.     Bladder Diary (PRIOR TO VESICARE):  Voids q30-90 minutes during the day, nocturia x 0-1.   Episodes of incontinence: multiple per day, goes through 4-6 Depends per day.  Incontinence associated with: sitting to standing, strong urge  Volume Intake: 1 cup of coffee, 2-4 glasses of water, 1 glass of OJ  Voided Volume: 1890 mL per day; average voided volume: 150 mL; largest voided volume: 280 mL    Bladder Diary (AFTER STARTING VESICARE):  Voids q2 hours during the day, nocturia x 0-1.   Episodes of incontinence: " "3 per day, goes through 4 Depends per day.  Incontinence associated with: strong urge  Volume Intake: 5 cups of coffee, 1 glass of OJ  Voided Volume: 1800 mL per day; average voided volume: 225 mL; largest voided volume: 295 mL    Urodynamics 9/12/18:  -Small bladder capacity (~270 mL) with normal filling sensations.   -Fair/poor bladder compliance (ratio 7.9) with quite a bit of detrusor overactivity throughout filling (max Pdet reaches ~30 cm H2O) but no incontinence (DO or stress-related).  -Strong detrusor contraction during voiding to 58.6 cm H2O with a slow flow (Qmax recorded as 1.7 mL/sec but this is artificially low as his urine stream was disrupted by the sheet underneath patient, so the flow was trickling off the sheet into the uroflow).  -There is also some incomplete bladder emptying (voided 100 mL; final  mL).   -Given these findings (high pressure/low flow), there is some evidence for possible bladder outlet obstruction which is further supported by BOOI of 48.9.  -No evidence for DSD as EMG tracings remain quiet during voiding.  -Fluoroscopy reveals a smooth-walled bladder without diverticuli or VUR. Bladder neck is closed during filling and open during voiding with possible area of narrowing in the mid-proximal urethra.    PEx  /68  Pulse 69  Ht 1.803 m (5' 11\")  Wt 91.6 kg (202 lb)  BMI 28.17 kg/m2  GEN: well-appearing male in NAD  RESP: no increased respiratory effort    PVR today: 20 mL as measured by ultrasound    PSA   Date Value Ref Range Status   12/20/2017 0.25 0 - 4 ug/L Final     Comment:     Assay Method:  Chemiluminescence using Siemens Vista analyzer       Creatinine   Date Value Ref Range Status   09/25/2018 2.82 (H) 0.66 - 1.25 mg/dL Final       A/P  73 year old male with urinary frequency, urgency, and urge incontinence. UDS on 9/12/18 showed small bladder capacity with reduced compliance and DO as well as possible evidence for bladder outlet obstruction. Currently, " his symptoms are very well controlled with Vesicare 5 mg daily (renally dosed), Myrbetriq 25 mg daily, and decreasing Lasix to 20 mg BID per nephrology recommendations. No longer incontinent, nocturia down from 3-4x/night --> 1-2x/night, and he continues to empty his bladder well (PVR today 20 mL). He would like to continue with current plan.  -Continue Vesicare 5 mg daily.  -Continue Myrbetriq 25 mg daily.   -Continue to practice timed voiding and double voiding.  -Continue to limit bladder irritants.    Follow up in 3 months to recheck, sooner with any issues.     15 minutes spent with the patient, >50% in counseling and coordination of care.    Karlene Odom PA-C  Urology

## 2018-10-15 NOTE — PATIENT INSTRUCTIONS
UROLOGY CLINIC VISIT PATIENT INSTRUCTIONS    1) Follow up with me (Karlene Odom PA-C) in 3 months.    If you have any issues, questions or concerns in the meantime, do not hesitate to contact us at [phone] or via [employer].     It was a pleasure meeting with you today.  Thank you for allowing me and my team the privilege of caring for you today.  YOU are the reason we are here, and I truly hope we provided you with the excellent service you deserve.  Please let us know if there is anything else we can do for you so that we can be sure you are leaving completely satisfied with your care experience.

## 2018-10-16 RX ORDER — METOPROLOL TARTRATE 25 MG/1
50 TABLET, FILM COATED ORAL 2 TIMES DAILY
Qty: 180 TABLET | Refills: 3 | Status: SHIPPED | OUTPATIENT
Start: 2018-10-16 | End: 2018-11-21

## 2018-10-16 NOTE — PROGRESS NOTES
Per Sheryl:    Please have him increase his Lopressor to 50 mg bid.   Creat is up, so won't be able to increase his Losartan.     Called patient and reviewed recommendations. He said his BP was better yesterday (130s/80s). He has not checked it yet today. I recommended that he go ahead and increase metoprolol (Lopressor) and let us know if his SBP is lower than 110 or if he's having any dizziness. Otherwise, he should follow up in clinic next week as scheduled.  Patient verbalized understanding.     Thierno Mace RN

## 2018-10-22 DIAGNOSIS — N18.4 CKD (CHRONIC KIDNEY DISEASE) STAGE 4, GFR 15-29 ML/MIN (H): Primary | ICD-10-CM

## 2018-10-24 ENCOUNTER — CARE COORDINATION (OUTPATIENT)
Dept: NEPHROLOGY | Facility: CLINIC | Age: 73
End: 2018-10-24

## 2018-10-24 NOTE — LETTER
November 5, 2018       TO: Austin Walker  [address] [place] [id number]       Dear Mr.Van Albert,    I am writing because I have been unable to reach you by phone. You missed a nephrology (kidney) appointment with Sheryl Mike NP on 10/23/2018. I have been trying to reach youto discuss rescheduling this appointment and to see how you are doing. Please call [phone] at your earliest convenience.    Thierno Mace, RN, BAN  Nephrology RN Care Coordinator

## 2018-10-24 NOTE — PROGRESS NOTES
Attempted to reach patient for CKD 4/ blood pressure follow up. He no-showed his follow up appointment yesterday.     Left VM for patient to return call.    Thierno Mace, RN, BAN  Nephrology RN Care Coordinator

## 2018-10-30 ENCOUNTER — PRE VISIT (OUTPATIENT)
Dept: UROLOGY | Facility: CLINIC | Age: 73
End: 2018-10-30

## 2018-10-30 NOTE — TELEPHONE ENCOUNTER
Patient is coming in to see Karlene Odom PA-C for a 3 month recheck on incontinence, no need for a call.

## 2018-11-21 ENCOUNTER — OFFICE VISIT (OUTPATIENT)
Dept: NEPHROLOGY | Facility: CLINIC | Age: 73
End: 2018-11-21
Payer: MEDICARE

## 2018-11-21 VITALS
OXYGEN SATURATION: 95 % | HEIGHT: 71 IN | SYSTOLIC BLOOD PRESSURE: 148 MMHG | WEIGHT: 200.6 LBS | HEART RATE: 72 BPM | TEMPERATURE: 98.6 F | BODY MASS INDEX: 28.08 KG/M2 | DIASTOLIC BLOOD PRESSURE: 78 MMHG

## 2018-11-21 DIAGNOSIS — N25.81 SECONDARY RENAL HYPERPARATHYROIDISM (H): ICD-10-CM

## 2018-11-21 DIAGNOSIS — I10 ESSENTIAL HYPERTENSION: ICD-10-CM

## 2018-11-21 DIAGNOSIS — R80.9 PROTEINURIA, UNSPECIFIED TYPE: ICD-10-CM

## 2018-11-21 DIAGNOSIS — N18.4 CKD (CHRONIC KIDNEY DISEASE) STAGE 4, GFR 15-29 ML/MIN (H): Primary | ICD-10-CM

## 2018-11-21 DIAGNOSIS — N18.4 CKD (CHRONIC KIDNEY DISEASE) STAGE 4, GFR 15-29 ML/MIN (H): ICD-10-CM

## 2018-11-21 LAB
ALBUMIN SERPL-MCNC: 3.3 G/DL (ref 3.4–5)
ANION GAP SERPL CALCULATED.3IONS-SCNC: 10 MMOL/L (ref 3–14)
BASOPHILS # BLD AUTO: 0 10E9/L (ref 0–0.2)
BASOPHILS NFR BLD AUTO: 0.2 %
BUN SERPL-MCNC: 38 MG/DL (ref 7–30)
CALCIUM SERPL-MCNC: 8.4 MG/DL (ref 8.5–10.1)
CHLORIDE SERPL-SCNC: 106 MMOL/L (ref 94–109)
CO2 SERPL-SCNC: 19 MMOL/L (ref 20–32)
CREAT SERPL-MCNC: 2.57 MG/DL (ref 0.66–1.25)
CREAT UR-MCNC: 58 MG/DL
DIFFERENTIAL METHOD BLD: ABNORMAL
EOSINOPHIL # BLD AUTO: 0.1 10E9/L (ref 0–0.7)
EOSINOPHIL NFR BLD AUTO: 2 %
ERYTHROCYTE [DISTWIDTH] IN BLOOD BY AUTOMATED COUNT: 13.7 % (ref 10–15)
GFR SERPL CREATININE-BSD FRML MDRD: 25 ML/MIN/1.7M2
GLUCOSE SERPL-MCNC: 518 MG/DL (ref 70–99)
HCT VFR BLD AUTO: 34 % (ref 40–53)
HGB BLD-MCNC: 10.9 G/DL (ref 13.3–17.7)
IMM GRANULOCYTES # BLD: 0 10E9/L (ref 0–0.4)
IMM GRANULOCYTES NFR BLD: 0.2 %
LYMPHOCYTES # BLD AUTO: 1.2 10E9/L (ref 0.8–5.3)
LYMPHOCYTES NFR BLD AUTO: 28.4 %
MCH RBC QN AUTO: 31 PG (ref 26.5–33)
MCHC RBC AUTO-ENTMCNC: 32.1 G/DL (ref 31.5–36.5)
MCV RBC AUTO: 97 FL (ref 78–100)
MONOCYTES # BLD AUTO: 0.3 10E9/L (ref 0–1.3)
MONOCYTES NFR BLD AUTO: 8.4 %
NEUTROPHILS # BLD AUTO: 2.5 10E9/L (ref 1.6–8.3)
NEUTROPHILS NFR BLD AUTO: 60.8 %
NRBC # BLD AUTO: 0 10*3/UL
NRBC BLD AUTO-RTO: 0 /100
PHOSPHATE SERPL-MCNC: 3.4 MG/DL (ref 2.5–4.5)
PLATELET # BLD AUTO: 202 10E9/L (ref 150–450)
POTASSIUM SERPL-SCNC: 5 MMOL/L (ref 3.4–5.3)
PROT UR-MCNC: 2.85 G/L
PROT/CREAT 24H UR: 4.91 G/G CR (ref 0–0.2)
RBC # BLD AUTO: 3.52 10E12/L (ref 4.4–5.9)
SODIUM SERPL-SCNC: 135 MMOL/L (ref 133–144)
WBC # BLD AUTO: 4.1 10E9/L (ref 4–11)

## 2018-11-21 PROCEDURE — 36415 COLL VENOUS BLD VENIPUNCTURE: CPT

## 2018-11-21 PROCEDURE — G0463 HOSPITAL OUTPT CLINIC VISIT: HCPCS | Mod: ZF

## 2018-11-21 PROCEDURE — 80069 RENAL FUNCTION PANEL: CPT

## 2018-11-21 PROCEDURE — 84156 ASSAY OF PROTEIN URINE: CPT

## 2018-11-21 PROCEDURE — 85025 COMPLETE CBC W/AUTO DIFF WBC: CPT

## 2018-11-21 RX ORDER — MINOXIDIL 2.5 MG/1
2.5 TABLET ORAL SEE ADMIN INSTRUCTIONS
Qty: 10 TABLET | Refills: 0
Start: 2018-11-21 | End: 2018-12-19

## 2018-11-21 RX ORDER — METOPROLOL TARTRATE 25 MG/1
75 TABLET, FILM COATED ORAL 2 TIMES DAILY
Qty: 180 TABLET | Refills: 3 | Status: SHIPPED | OUTPATIENT
Start: 2018-11-21 | End: 2019-07-09 | Stop reason: DRUGHIGH

## 2018-11-21 ASSESSMENT — PAIN SCALES - GENERAL: PAINLEVEL: NO PAIN (0)

## 2018-11-21 NOTE — NURSING NOTE
"Chief Complaint   Patient presents with     RECHECK     CKD      /78  Pulse 72  Temp 98.6  F (37  C) (Oral)  Ht 1.803 m (5' 11\")  Wt 91 kg (200 lb 9.6 oz)  SpO2 95%  BMI 27.98 kg/m2  Alexus Lemons, FRANCISCA    "

## 2018-11-21 NOTE — LETTER
11/21/2018      RE: Austin Walker  [address] [facility] [address]       Nephrology Clinic Visit 11/21/18    Assessment and Plan:       1. Medication questions - Given that patient has not taken any insulin today and our appointment time was 4:30 pm, I wonder about what is really going on at home. Will message his PCP ( Dr Celso Esparza - [facility] phone [phone], fax [phone])).       2. CKD4 w/proteinuria - Creat 2.85 today, eGFR 25 ml/mn.  UPCR has jumped to 4.91 g/gCr despite restarting ARB. No retention found during Urology evaluation in Sept 2018. On vesicare and Mirabegron by Urology.   Etiology of CKD is bx proven DM/HTN related CKD   - Will review proteinuria with Dr Vyas.    - Has been relatively stable since 2016   - Would refer to [facility] if renal function begins to decline more rapidly   - Does not use NSAIDs   - On renal dosing for Vesicare      3. Volume status - Euvolemia. Has only trace edema. No dyspnea. Albumin 3.3. Weight 91 kg, was 91.7 kg last visit. B/P 150-160/ today in clinic. Off Lasix now since August.    - No need to restart Lasix given severe urinary frequency and tapering off of Minoxidil.          4. HTN - Questionable control. Clinic blood pressures today; 150-160/, but I doubt he has taken his meds given that he didn't take his insulin. Clinic blood pressure 2 wks ago was 138/68.   Current regimen:    Losartan 50 mg every day    Lopressor 50 mg bid   Minoxidil 2.5 mg every day  - Will change to the following:     Minoxidil 2.5 mg MWF x 2 wks, then Tues/Sat x 2 wks, then discontinue    Losartan 50 mg every day    Lopressor  75 mg bid   - Will have my nurse call regarding blood pressures in one week   -  Reinforced need for daily blood pressure monitoring      5. Electrolytes - No acute concerns. K 5.0      6. Acid base - Bicarb 19. Suspect he missed his meds today   - Continue Bicarb 650 mg bid      7. BMD - Ca 8.4, Phos 3.4, albumin 3.3   - Vit D 31 (9/18)   -   (9/18)   - Continue Calcitriol 0.25 mcq qd      8. Anemia - Hgb 10.9.    - Enrolled in Anemia management   - On iron bid   -Iron studies 10/18: Ferritin 345, IS 44, Fe 87   - Will check on last colonoscopy      9. Disposition - RTC one month for f/u.       Assessment and plan was discussed with patient and he voiced his understanding and agreement.      Reason for Visit:  CKD4/HTN f/u      HPI:  Mr Walker is a 72 yo male with CKD4, HTN, DM2, MGUS, previous CVA, in today for routine CKD f/u. Last seen by me on 9/25/18. At that visit we began tapering down Minoxidil and Losartan was restarted. He has not responded to phone calls from nursing regarding his blood pressure readings and admits he has not been checking regularly. Urology clinic visit on 11/19 showed b/p of 138/68. Baseline creat in the upper 2-3 range since 8/16. He is non proteinuric.       Interval Hx:   No hospital admissions  Being followed by Urology for urinary frequency, incontinence. No retention.       ROS:  Patient has not taken any further Lasix but continues on Mirabegron and Solifenacin w/o much improvement in urinary frequency.   He admits today that he has not taken his insulin (blood sugar is 500)  Because he was confused about the time of day.   Denies dyspnea, CP, abdominal pain. Appetite is OK but only eats one meal/day.       Chronic Health Problems:      CKD4  DM2  H/O CVA  HTN  MGUS  Anemia  PRINCE on CPAP  Severe Urinary frequency/incontinence  HLD      Personal Hx:   Single, lives alone in own home. Sets up own meds. Does not drive, NS, ETOH none, uses walker     Allergies:  Allergies   Allergen Reactions     Lisinopril      Other reaction(s): Renal Failure     Armodafinil Rash       Medications:  Current Outpatient Prescriptions   Medication Sig     ASPIRIN EC PO Take 81 mg by mouth daily      atorvastatin (LIPITOR) 20 MG tablet Take 20 mg by mouth every evening 1700     blood glucose monitoring (NOVA SUREFLEX) lancets  3x/day with blood glucose testing     darbepoetin aaliyah-polysorbate (ARANESP, ALBUMIN FREE,) 300 MCG/ML SOLN Inject 300 mcg Subcutaneous every 14 days As needed for hgb <=11g/dL     ferrous sulfate 325 (65 FE) MG tablet Take 325 mg by mouth 2 times daily      fluticasone (FLONASE) 50 MCG/ACT nasal spray Spray 1 spray into both nostrils 2 times daily as needed      gabapentin (NEURONTIN) 300 MG capsule Take 600 mg by mouth 2 times daily     insulin aspart (NOVOLOG PEN) 100 UNIT/ML injection Inject subcutaneous 3 times daily with meals and bedtime Medium Scale less than 70 .................. Refer to Hypoglycemia Treatment Protocol 70 - 149 ......................... No corrective insulin 150 - 199 ....................... 2 units 200 - 249 ....................... 4 units 250 - 299 ....................... 6 units 300 - 349 ....................... 8 units 350 or greater ................ 10 units and notify MD       insulin detemir (LEVEMIR FLEXPEN/FLEXTOUCH) 100 UNIT/ML soln Inject 10 Units Subcutaneous 2 times daily Dose was originally 50 units BID but pt has been cutting back (Patient taking differently: Inject 30 Units Subcutaneous 2 times daily Dose was originally 50 units BID but pt has been cutting back)     insulin pen needle (B-D U/F) 31G X 8 MM USE AS DIRECTED     loratadine (CLARITIN) 10 MG tablet Take 10 mg by mouth daily     losartan (COZAAR) 50 MG tablet Take 1 tablet (50 mg) by mouth daily     metoprolol tartrate (LOPRESSOR) 25 MG tablet Take 3 tablets (75 mg) by mouth 2 times daily     minoxidil (LONITEN) 2.5 MG tablet Take 1 tablet (2.5 mg) by mouth See Admin Instructions     mirabegron (MYRBETRIQ) 25 MG 24 hr tablet Take 1 tablet (25 mg) by mouth daily     Misc Natural Products (OSTEO BI-FLEX ADV TRIPLE ST PO) Take by mouth daily     Multiple Vitamin (MULTI-VITAMINS) TABS Take 1 tablet by mouth daily      omeprazole (PRILOSEC) 20 MG capsule Take 20 mg by mouth daily      order for DME 4 wheeled walker  "with brakes and a seat.     sodium bicarbonate 650 MG tablet Take 1 tablet (650 mg) by mouth 2 times daily     solifenacin (VESICARE) 5 MG tablet Take 1 tablet (5 mg) by mouth daily     UNABLE TO FIND Take by mouth daily MEDICATION NAME: Brain Peak     calcitRIOL (ROCALTROL) 0.25 MCG capsule Take 1 capsule (0.25 mcg) by mouth daily for 28 days          No current facility-administered medications for this visit.       Vitals:  /78  Pulse 72  Temp 98.6  F (37  C) (Oral)  Ht 1.803 m (5' 11\")  Wt 91 kg (200 lb 9.6 oz)  SpO2 95%  BMI 27.98 kg/m2    Exam:  GEN: Elderly male in NAD.   CARDIAC: RRR  LUNGS: CTA  ABDOMEN: Soft, NT  EXT: tr edema  NEURO: Appears slightly impaired, speech is more difficult to understand    LABS:   CMP  Recent Labs   Lab Test  11/21/18   1501  10/15/18   1117  09/25/18   1320  08/22/18   1434   NA  135  134  138  139   POTASSIUM  5.0  4.2  4.2  4.6   CHLORIDE  106  103  110*  106   CO2  19*  21  20  23   ANIONGAP  10  10  9  10   GLC  518*  348*  222*  227*   BUN  38*  56*  47*  66*   CR  2.57*  3.48*  2.82*  3.50*   GFRESTIMATED  25*  17*  22*  17*   GFRESTBLACK  30*  21*  27*  21*   NAZIA  8.4*  8.4*  8.9  8.8     Recent Labs   Lab Test  04/04/18   1338  12/20/17   1130  08/02/16   1305 08/02/16  12/11/15   1016  09/14/15   0719   BILITOTAL  0.4  0.5   --    --   0.4  0.9   ALKPHOS  186*  218*   --   141*  207*  168*   ALT  16  18   --   14  36  24   AST  24  24  18   --   29  30     CBC  Recent Labs   Lab Test  11/21/18   1501  10/15/18   1117  09/25/18   1320  08/22/18   1434   HGB  10.9*  11.5*  10.3*  10.5*   WBC  4.1  4.3  4.1  4.5   RBC  3.52*  3.63*  3.16*  3.15*   HCT  34.0*  33.8*  30.1*  29.9*   MCV  97  93  95  95   MCH  31.0  31.7  32.6  33.3*   MCHC  32.1  34.0  34.2  35.1   RDW  13.7  12.7  12.9  13.7   PLT  202  210  191  205     URINE STUDIES  Recent Labs   Lab Test 09/12/18  12/11/15   1038  09/14/15   0958  03/02/15   1407  02/11/15   1045   COLOR  Yellow  Light " Yellow  Light Yellow  Yellow  Light Yellow   APPEARANCE  Clear  Clear  Clear  Clear  Clear   URINEGLC  250   300*  Negative  Negative  Negative   URINEBILI  Neg  Negative  Negative  Negative  Negative   URINEKETONE  Neg  Negative  Negative  Negative  Negative   SG  1.025  1.007  1.010  1.012  1.010   UBLD  Small  Negative  Trace*  Small*  Negative   URINEPH  5.5  5.5  6.0  6.0  6.0   PROTEIN  300   30*  300*  300*  100*   UROBILINOGEN  0.2   --    --    --    --    NITRITE  Neg  Negative  Negative  Negative  Negative   LEUKEST  Neg  Negative  Negative  Negative  Negative   RBCU   --   1  1  4*  3*   WBCU   --   <1  2  <1  1     Recent Labs   Lab Test  11/21/18   1513  10/15/18   1130  09/25/18   1357  09/21/16   1239   UTPG  4.91*  2.13*  2.74*  1.01*     PTH  Recent Labs   Lab Test  10/15/18   1117  09/25/18   1320  06/08/16   1146   PTHI  868*  559*  859*     IRON STUDIES  Recent Labs   Lab Test  10/15/18   1117  09/25/18   1320  08/09/17   1133   IRON  87  70  96   FEB  195*  195*  191*   IRONSAT  44  36  50*   HUMPHREY  345  250  285       Tiny Duff, JENISE

## 2018-11-21 NOTE — MR AVS SNAPSHOT
After Visit Summary   11/21/2018    Austin Walker    MRN: [id number]           Patient Information     Date Of Birth          1945        Visit Information        Provider Department      11/21/2018 4:30 PM Tiny Duff NP [facility]        Today's Diagnoses     Essential hypertension        CKD (chronic kidney disease) stage 4, GFR 15-29 ml/min (H)          Care Instructions    1. Increase Metoprolol to 75 mg twice per day  2. Decrease Minoxidil to 2.5 mg on MWF for 2 wks, then Tues and Saturday for 2 wks, then stop  3. Check blood pressures daily and record  4. Bring in machine and readings to next visit  5. My nurse will call you next week for blood pressure check          Follow-ups after your visit        Follow-up notes from your care team     Return in about 4 weeks (around 12/19/2018).      Your next 10 appointments already scheduled     Dec 19, 2018  1:30 PM CST   Lab with  LAB   [facility] ([facility])    [address] [place] [address]  1st Floor  [facility] [address] [phone]            Dec 19, 2018  2:30 PM CST   (Arrive by 2:00 PM)   Return Visit with Tiny Duff NP   [facility] ([facility])    [address] [place] [address]  [address] [place] [address] [phone]              Who to contact     If you have questions or need follow up information about today's clinic visit or your schedule please contact [facility] directly at [phone].  Normal or non-critical lab and imaging results will be communicated to you by MyChart, letter or phone within 4 business days after the clinic has received the results. If you do not hear from us within 7 days, please contact the clinic through [employer] or phone. If you have a critical or abnormal lab result, we will notify you by phone as soon as possible.  Submit refill requests through [employer] or call your pharmacy and they will forward the  "refill request to us. Please allow 3 business days for your refill to be completed.          Additional Information About Your Visit        .[employer] Information     [employer] gives you secure access to your electronic health record. If you see a primary care provider, you can also send messages to your care team and make appointments. If you have questions, please call your primary care clinic.  If you do not have a primary care provider, please call [phone] and they will assist you.        Care EveryWhere ID     This is your Care EveryWhere ID. This could be used by other organizations to access your [facility] medical records  [id number]        Your Vitals Were     Pulse Temperature Height Pulse Oximetry BMI (Body Mass Index)       72 98.6  F (37  C) (Oral) 1.803 m (5' 11\") 95% 27.98 kg/m2        Blood Pressure from Last 3 Encounters:   11/21/18 148/78   10/15/18 138/68   09/25/18 126/63    Weight from Last 3 Encounters:   11/21/18 91 kg (200 lb 9.6 oz)   10/15/18 91.6 kg (202 lb)   09/25/18 91.7 kg (202 lb 3.2 oz)              Today, you had the following     No orders found for display         Today's Medication Changes          These changes are accurate as of 11/21/18  4:47 PM.  If you have any questions, ask your nurse or doctor.               These medicines have changed or have updated prescriptions.        Dose/Directions    insulin detemir 100 UNIT/ML injection   Commonly known as:  LEVEMIR FLEXPEN/FLEXTOUCH   This may have changed:    - how much to take  - additional instructions   Used for:  Diabetes mellitus, type 2 (H)        Dose:  10 Units   Inject 10 Units Subcutaneous 2 times daily Dose was originally 50 units BID but pt has been cutting back   Refills:  0       metoprolol tartrate 25 MG tablet   Commonly known as:  LOPRESSOR   This may have changed:  how much to take   Used for:  Essential hypertension   Changed by:  Tiny Duff, JENISE        Dose:  75 mg   Take 3 tablets (75 mg) by mouth " 2 times daily   Quantity:  180 tablet   Refills:  3       minoxidil 2.5 MG tablet   Commonly known as:  LONITEN   This may have changed:  when to take this   Used for:  CKD (chronic kidney disease) stage 4, GFR 15-29 ml/min (H)   Changed by:  Tiny Duff NP        Dose:  2.5 mg   Take 1 tablet (2.5 mg) by mouth See Admin Instructions   Quantity:  10 tablet   Refills:  0            Where to get your medicines      These medications were sent to [facility], [place] [address] [facility] [address], [facility] [address]    Hours:  24-hours Phone:  [phone]     metoprolol tartrate 25 MG tablet         Some of these will need a paper prescription and others can be bought over the counter.  Ask your nurse if you have questions.     You don't need a prescription for these medications     minoxidil 2.5 MG tablet                Primary Care Provider Office Phone # Fax #    Celso Esparza [phone]       [facility] [address]  [facility] [address]        Equal Access to Services     [facility] AH: Hadii lili ku hadasho Socarl, waaxda luqadaha, qaybta kaalmada jojo, awilda leigh. So [facility] [phone].    ATENCIÓN: Si habla español, tiene a schaefer disposición servicios gratuitos de asistencia lingüística. Edgardo al [phone].    We comply with applicable federal civil rights laws and [place] laws. We do not discriminate on the basis of race, color, national origin, age, disability, sex, sexual orientation, or gender identity.            Thank you!     Thank you for choosing [facility]  for your care. Our goal is always to provide you with excellent care. Hearing back from our patients is one way we can continue to improve our services. Please take a few minutes to complete the written survey that you may receive in the mail after your visit with us. Thank you!             Your Updated  Medication List - Protect others around you: Learn how to safely use, store and throw away your medicines at www.disposemymeds.org.          This list is accurate as of 11/21/18  4:47 PM.  Always use your most recent med list.                   Brand Name Dispense Instructions for use Diagnosis    ARANESP (ALBUMIN FREE) 300 MCG/ML Soln   Generic drug:  darbepoetin aaliyah-polysorbate      Inject 300 mcg Subcutaneous every 14 days As needed for hgb <=11g/dL        ASPIRIN EC PO      Take 81 mg by mouth daily        atorvastatin 20 MG tablet    LIPITOR     Take 20 mg by mouth every evening 1700        B-D U/F 31G X 8 MM miscellaneous   Generic drug:  insulin pen needle      USE AS DIRECTED        blood glucose monitoring lancets      3x/day with blood glucose testing        calcitRIOL 0.25 MCG capsule    ROCALTROL    90 capsule    Take 1 capsule (0.25 mcg) by mouth daily for 28 days    Secondary renal hyperparathyroidism (H)       ferrous sulfate 325 (65 Fe) MG tablet    IRON     Take 325 mg by mouth 2 times daily        FLONASE 50 MCG/ACT spray   Generic drug:  fluticasone      Spray 1 spray into both nostrils 2 times daily as needed        furosemide 20 MG tablet    LASIX    30 tablet    Take 1 tablet (20 mg) by mouth 2 times daily        gabapentin 300 MG capsule    NEURONTIN     Take 600 mg by mouth 2 times daily        insulin aspart 100 UNIT/ML injection    NovoLOG PEN     Inject subcutaneous 3 times daily with meals and bedtime?Medium Scale?less than 70 .................. Refer to Hypoglycemia?Treatment Protocol?70 - 149 ......................... No corrective insulin?150 - 199 ....................... 2 units?200 - 249 ....................... 4 units?250 - 299 ....................... 6 units?300 - 349 ....................... 8 units?350 or greater ................ 10 units and notify MD        insulin detemir 100 UNIT/ML injection    LEVEMIR FLEXPEN/FLEXTOUCH     Inject 10 Units Subcutaneous 2 times daily Dose was  originally 50 units BID but pt has been cutting back    Diabetes mellitus, type 2 (H)       loratadine 10 MG tablet    CLARITIN     Take 10 mg by mouth daily        losartan 50 MG tablet    COZAAR    90 tablet    Take 1 tablet (50 mg) by mouth daily    Essential hypertension       metoprolol tartrate 25 MG tablet    LOPRESSOR    180 tablet    Take 3 tablets (75 mg) by mouth 2 times daily    Essential hypertension       minoxidil 2.5 MG tablet    LONITEN    10 tablet    Take 1 tablet (2.5 mg) by mouth See Admin Instructions    CKD (chronic kidney disease) stage 4, GFR 15-29 ml/min (H)       mirabegron 25 MG 24 hr tablet    MYRBETRIQ    30 tablet    Take 1 tablet (25 mg) by mouth daily    Urge incontinence       MULTI-VITAMINS Tabs      Take 1 tablet by mouth daily        omeprazole 20 MG CR capsule    priLOSEC     Take 20 mg by mouth daily        order for DME      4 wheeled walker with brakes and a seat.        OSTEO BI-FLEX ADV TRIPLE ST PO      Take by mouth daily        sodium bicarbonate 650 MG tablet     120 tablet    Take 1 tablet (650 mg) by mouth 2 times daily    CKD (chronic kidney disease)       solifenacin 5 MG tablet    VESICARE    30 tablet    Take 1 tablet (5 mg) by mouth daily    Urge incontinence       UNABLE TO FIND      Take by mouth daily MEDICATION NAME: Brain Peak

## 2018-11-21 NOTE — PATIENT INSTRUCTIONS
1. Increase Metoprolol to 75 mg twice per day  2. Decrease Minoxidil to 2.5 mg on MWF for 2 wks, then Tues and Saturday for 2 wks, then stop  3. Check blood pressures daily and record  4. Bring in machine and readings to next visit  5. My nurse will call you next week for blood pressure check

## 2018-11-22 NOTE — PROGRESS NOTES
Nephrology Clinic Visit 11/21/18    Assessment and Plan:       1. Medication questions - Given that patient has not taken any insulin today and our appointment time was 4:30 pm, I wonder about what is really going on at home. Will message his PCP ( Dr Celso Mike phone [phone], fax [phone])).       2. CKD4 w/proteinuria - Creat 2.85 today, eGFR 25 ml/mn. UPCR has jumped to 4.91 g/gCr despite restarting ARB. No retention found during Urology evaluation in Sept 2018. On vesicare and Mirabegron by Urology.   Etiology of CKD is bx proven DM/HTN related CKD   - Will review proteinuria with Dr Vyas.    - Has been relatively stable since 2016   - Would refer to [facility] if renal function begins to decline more rapidly   - Does not use NSAIDs   - On renal dosing for Vesicare      3. Volume status - Euvolemia. Has only trace edema. No dyspnea. Albumin 3.3. Weight 91 kg, was 91.7 kg last visit. B/P 150-160/ today in clinic. Off Lasix now since August.    - No need to restart Lasix given severe urinary frequency and tapering off of Minoxidil.          4. HTN - Questionable control. Clinic blood pressures today; 150-160/, but I doubt he has taken his meds given that he didn't take his insulin. Clinic blood pressure 2 wks ago was 138/68.   Current regimen:    Losartan 50 mg every day    Lopressor 50 mg bid   Minoxidil 2.5 mg every day  - Will change to the following:     Minoxidil 2.5 mg MWF x 2 wks, then Tues/Sat x 2 wks, then discontinue    Losartan 50 mg every day    Lopressor 75 mg bid   - Will have my nurse call regarding blood pressures in one week   - Reinforced need for daily blood pressure monitoring      5. Electrolytes - No acute concerns. K 5.0      6. Acid base - Bicarb 19. Suspect he missed his meds today   - Continue Bicarb 650 mg bid      7. BMD - Ca 8.4, Phos 3.4, albumin 3.3   - Vit D 31 (9/18)   -  (9/18)   - Continue Calcitriol 0.25 mcq qd      8. Anemia - Hgb 10.9.    -  Enrolled in Anemia management   - On iron bid   -Iron studies 10/18: Ferritin 345, IS 44, Fe 87   - Will check on last colonoscopy      9. Disposition - RTC one month for f/u.       Assessment and plan was discussed with patient and he voiced his understanding and agreement.      Reason for Visit:  CKD4/HTN f/u      HPI:  Mr Walker is a 74 yo male with CKD4, HTN, DM2, MGUS, previous CVA, in today for routine CKD f/u. Last seen by me on 9/25/18. At that visit we began tapering down Minoxidil and Losartan was restarted. He has not responded to phone calls from nursing regarding his blood pressure readings and admits he has not been checking regularly. Urology clinic visit on 11/19 showed b/p of 138/68. Baseline creat in the upper 2-3 range since 8/16. He is non proteinuric.       Interval Hx:   No hospital admissions  Being followed by Urology for urinary frequency, incontinence. No retention.       ROS:  Patient has not taken any further Lasix but continues on Mirabegron and Solifenacin w/o much improvement in urinary frequency.   He admits today that he has not taken his insulin (blood sugar is 500)  Because he was confused about the time of day.   Denies dyspnea, CP, abdominal pain. Appetite is OK but only eats one meal/day.       Chronic Health Problems:      CKD4  DM2  H/O CVA  HTN  MGUS  Anemia  PRINCE on CPAP  Severe Urinary frequency/incontinence  HLD      Personal Hx:   Single, lives alone in own home. Sets up own meds. Does not drive, NS, ETOH none, uses walker     Allergies:  Allergies   Allergen Reactions     Lisinopril      Other reaction(s): Renal Failure     Armodafinil Rash       Medications:  Current Outpatient Prescriptions   Medication Sig     ASPIRIN EC PO Take 81 mg by mouth daily      atorvastatin (LIPITOR) 20 MG tablet Take 20 mg by mouth every evening 1700     blood glucose monitoring (NOVA SUREFLEX) lancets 3x/day with blood glucose testing     darbepoetin aaliyah-polysorbate (ARANESP, ALBUMIN  FREE,) 300 MCG/ML SOLN Inject 300 mcg Subcutaneous every 14 days As needed for hgb <=11g/dL     ferrous sulfate 325 (65 FE) MG tablet Take 325 mg by mouth 2 times daily      fluticasone (FLONASE) 50 MCG/ACT nasal spray Spray 1 spray into both nostrils 2 times daily as needed      gabapentin (NEURONTIN) 300 MG capsule Take 600 mg by mouth 2 times daily     insulin aspart (NOVOLOG PEN) 100 UNIT/ML injection Inject subcutaneous 3 times daily with meals and bedtime Medium Scale less than 70 .................. Refer to Hypoglycemia Treatment Protocol 70 - 149 ......................... No corrective insulin 150 - 199 ....................... 2 units 200 - 249 ....................... 4 units 250 - 299 ....................... 6 units 300 - 349 ....................... 8 units 350 or greater ................ 10 units and notify MD       insulin detemir (LEVEMIR FLEXPEN/FLEXTOUCH) 100 UNIT/ML soln Inject 10 Units Subcutaneous 2 times daily Dose was originally 50 units BID but pt has been cutting back (Patient taking differently: Inject 30 Units Subcutaneous 2 times daily Dose was originally 50 units BID but pt has been cutting back)     insulin pen needle (B-D U/F) 31G X 8 MM USE AS DIRECTED     loratadine (CLARITIN) 10 MG tablet Take 10 mg by mouth daily     losartan (COZAAR) 50 MG tablet Take 1 tablet (50 mg) by mouth daily     metoprolol tartrate (LOPRESSOR) 25 MG tablet Take 3 tablets (75 mg) by mouth 2 times daily     minoxidil (LONITEN) 2.5 MG tablet Take 1 tablet (2.5 mg) by mouth See Admin Instructions     mirabegron (MYRBETRIQ) 25 MG 24 hr tablet Take 1 tablet (25 mg) by mouth daily     Misc Natural Products (OSTEO BI-FLEX ADV TRIPLE ST PO) Take by mouth daily     Multiple Vitamin (MULTI-VITAMINS) TABS Take 1 tablet by mouth daily      omeprazole (PRILOSEC) 20 MG capsule Take 20 mg by mouth daily      order for DME 4 wheeled walker with brakes and a seat.     sodium bicarbonate 650 MG tablet Take 1 tablet (650 mg) by  "mouth 2 times daily     solifenacin (VESICARE) 5 MG tablet Take 1 tablet (5 mg) by mouth daily     UNABLE TO FIND Take by mouth daily MEDICATION NAME: Brain Peak     calcitRIOL (ROCALTROL) 0.25 MCG capsule Take 1 capsule (0.25 mcg) by mouth daily for 28 days          No current facility-administered medications for this visit.       Vitals:  /78  Pulse 72  Temp 98.6  F (37  C) (Oral)  Ht 1.803 m (5' 11\")  Wt 91 kg (200 lb 9.6 oz)  SpO2 95%  BMI 27.98 kg/m2    Exam:  GEN: Elderly male in NAD.   CARDIAC: RRR  LUNGS: CTA  ABDOMEN: Soft, NT  EXT: tr edema  NEURO: Appears slightly impaired, speech is more difficult to understand    LABS:   CMP  Recent Labs   Lab Test  11/21/18   1501  10/15/18   1117  09/25/18   1320  08/22/18   1434   NA  135  134  138  139   POTASSIUM  5.0  4.2  4.2  4.6   CHLORIDE  106  103  110*  106   CO2  19*  21  20  23   ANIONGAP  10  10  9  10   GLC  518*  348*  222*  227*   BUN  38*  56*  47*  66*   CR  2.57*  3.48*  2.82*  3.50*   GFRESTIMATED  25*  17*  22*  17*   GFRESTBLACK  30*  21*  27*  21*   NAZIA  8.4*  8.4*  8.9  8.8     Recent Labs   Lab Test  04/04/18   1338  12/20/17   1130  08/02/16   1305 08/02/16  12/11/15   1016  09/14/15   0719   BILITOTAL  0.4  0.5   --    --   0.4  0.9   ALKPHOS  186*  218*   --   141*  207*  168*   ALT  16  18   --   14  36  24   AST  24  24  18   --   29  30     CBC  Recent Labs   Lab Test  11/21/18   1501  10/15/18   1117  09/25/18   1320  08/22/18   1434   HGB  10.9*  11.5*  10.3*  10.5*   WBC  4.1  4.3  4.1  4.5   RBC  3.52*  3.63*  3.16*  3.15*   HCT  34.0*  33.8*  30.1*  29.9*   MCV  97  93  95  95   MCH  31.0  31.7  32.6  33.3*   MCHC  32.1  34.0  34.2  35.1   RDW  13.7  12.7  12.9  13.7   PLT  202  210  191  205     URINE STUDIES  Recent Labs   Lab Test 09/12/18  12/11/15   1038  09/14/15   0958  03/02/15   1407  02/11/15   1045   COLOR  Yellow  Light Yellow  Light Yellow  Yellow  Light Yellow   APPEARANCE  Clear  Clear  Clear  Clear  " Clear   URINEGLC  250   300*  Negative  Negative  Negative   URINEBILI  Neg  Negative  Negative  Negative  Negative   URINEKETONE  Neg  Negative  Negative  Negative  Negative   SG  1.025  1.007  1.010  1.012  1.010   UBLD  Small  Negative  Trace*  Small*  Negative   URINEPH  5.5  5.5  6.0  6.0  6.0   PROTEIN  300   30*  300*  300*  100*   UROBILINOGEN  0.2   --    --    --    --    NITRITE  Neg  Negative  Negative  Negative  Negative   LEUKEST  Neg  Negative  Negative  Negative  Negative   RBCU   --   1  1  4*  3*   WBCU   --   <1  2  <1  1     Recent Labs   Lab Test  11/21/18   1513  10/15/18   1130  09/25/18   1357  09/21/16   1239   UTPG  4.91*  2.13*  2.74*  1.01*     PTH  Recent Labs   Lab Test  10/15/18   1117  09/25/18   1320  06/08/16   1146   PTHI  868*  559*  859*     IRON STUDIES  Recent Labs   Lab Test  10/15/18   1117  09/25/18   1320  08/09/17   1133   IRON  87  70  96   FEB  195*  195*  191*   IRONSAT  44  36  50*   HUMPHREY  345  250  285       Tiny Duff, NP

## 2018-11-28 ENCOUNTER — CARE COORDINATION (OUTPATIENT)
Dept: NEPHROLOGY | Facility: CLINIC | Age: 73
End: 2018-11-28

## 2018-11-28 DIAGNOSIS — N18.4 CHRONIC KIDNEY DISEASE, STAGE 4 (SEVERE) (H): ICD-10-CM

## 2018-11-28 DIAGNOSIS — R80.9 PROTEINURIA: Primary | ICD-10-CM

## 2018-11-28 NOTE — PROGRESS NOTES
Attempted to reach patient for CKD 4 follow up.   Left VM for patient to return call.    Need to discuss home blood pressure readings, review/ confirm his medications, and update patient on new lab orders (serum immunofixation, serum light chains, UPCR, UACR) to evaluate proteinuria- should be done this week if possible.    Thierno Mace RN   Nephrology RN Care Coordinator

## 2018-11-29 NOTE — PROGRESS NOTES
Attempted to reach patient again at both numbers listed in chart. Mailbox is full on home number; left VM on cell.    Thierno Mace RN

## 2018-12-04 NOTE — PROGRESS NOTES
Made third attempt to reach patient. Left another voice mail. Will close encounter now. Updated Sheryl that I've been unable to reach patient to review blood pressure or discuss lab orders for proteinuria.    Thierno Mace RN

## 2018-12-11 ENCOUNTER — TRANSFERRED RECORDS (OUTPATIENT)
Dept: HEALTH INFORMATION MANAGEMENT | Facility: CLINIC | Age: 73
End: 2018-12-11

## 2018-12-18 DIAGNOSIS — N18.4 CKD (CHRONIC KIDNEY DISEASE) STAGE 4, GFR 15-29 ML/MIN (H): Primary | ICD-10-CM

## 2018-12-19 ENCOUNTER — PATIENT OUTREACH (OUTPATIENT)
Dept: CARE COORDINATION | Facility: CLINIC | Age: 73
End: 2018-12-19

## 2018-12-19 ENCOUNTER — OFFICE VISIT (OUTPATIENT)
Dept: NEPHROLOGY | Facility: CLINIC | Age: 73
End: 2018-12-19
Payer: MEDICARE

## 2018-12-19 VITALS
WEIGHT: 192.4 LBS | OXYGEN SATURATION: 99 % | DIASTOLIC BLOOD PRESSURE: 71 MMHG | HEART RATE: 57 BPM | SYSTOLIC BLOOD PRESSURE: 156 MMHG | HEIGHT: 71 IN | BODY MASS INDEX: 26.94 KG/M2

## 2018-12-19 DIAGNOSIS — R80.9 PROTEINURIA, UNSPECIFIED TYPE: ICD-10-CM

## 2018-12-19 DIAGNOSIS — N18.4 CHRONIC KIDNEY DISEASE, STAGE 4 (SEVERE) (H): ICD-10-CM

## 2018-12-19 DIAGNOSIS — N18.4 CKD (CHRONIC KIDNEY DISEASE) STAGE 4, GFR 15-29 ML/MIN (H): ICD-10-CM

## 2018-12-19 DIAGNOSIS — I10 ESSENTIAL HYPERTENSION: ICD-10-CM

## 2018-12-19 DIAGNOSIS — Z76.82 ORGAN TRANSPLANT CANDIDATE: ICD-10-CM

## 2018-12-19 DIAGNOSIS — K21.9 GASTROESOPHAGEAL REFLUX DISEASE WITHOUT ESOPHAGITIS: ICD-10-CM

## 2018-12-19 DIAGNOSIS — N25.81 SECONDARY RENAL HYPERPARATHYROIDISM (H): ICD-10-CM

## 2018-12-19 DIAGNOSIS — N18.6 END STAGE RENAL DISEASE (H): ICD-10-CM

## 2018-12-19 DIAGNOSIS — R80.9 PROTEINURIA: ICD-10-CM

## 2018-12-19 DIAGNOSIS — N18.30 CKD (CHRONIC KIDNEY DISEASE) STAGE 3, GFR 30-59 ML/MIN (H): Primary | ICD-10-CM

## 2018-12-19 DIAGNOSIS — D50.9 IRON DEFICIENCY ANEMIA, UNSPECIFIED IRON DEFICIENCY ANEMIA TYPE: ICD-10-CM

## 2018-12-19 LAB
ALBUMIN SERPL-MCNC: 3.1 G/DL (ref 3.4–5)
ANION GAP SERPL CALCULATED.3IONS-SCNC: 8 MMOL/L (ref 3–14)
BASOPHILS # BLD AUTO: 0 10E9/L (ref 0–0.2)
BASOPHILS NFR BLD AUTO: 0.3 %
BUN SERPL-MCNC: 29 MG/DL (ref 7–30)
CALCIUM SERPL-MCNC: 8.3 MG/DL (ref 8.5–10.1)
CHLORIDE SERPL-SCNC: 110 MMOL/L (ref 94–109)
CO2 SERPL-SCNC: 21 MMOL/L (ref 20–32)
CREAT SERPL-MCNC: 2.5 MG/DL (ref 0.66–1.25)
CREAT UR-MCNC: 119 MG/DL
DIFFERENTIAL METHOD BLD: ABNORMAL
EOSINOPHIL # BLD AUTO: 0.1 10E9/L (ref 0–0.7)
EOSINOPHIL NFR BLD AUTO: 1.8 %
ERYTHROCYTE [DISTWIDTH] IN BLOOD BY AUTOMATED COUNT: 14.7 % (ref 10–15)
GFR SERPL CREATININE-BSD FRML MDRD: 25 ML/MIN/{1.73_M2}
GLUCOSE SERPL-MCNC: 188 MG/DL (ref 70–99)
HCT VFR BLD AUTO: 32 % (ref 40–53)
HGB BLD-MCNC: 10.7 G/DL (ref 13.3–17.7)
IMM GRANULOCYTES # BLD: 0 10E9/L (ref 0–0.4)
IMM GRANULOCYTES NFR BLD: 0 %
LYMPHOCYTES # BLD AUTO: 1.6 10E9/L (ref 0.8–5.3)
LYMPHOCYTES NFR BLD AUTO: 39.5 %
MCH RBC QN AUTO: 31.8 PG (ref 26.5–33)
MCHC RBC AUTO-ENTMCNC: 33.4 G/DL (ref 31.5–36.5)
MCV RBC AUTO: 95 FL (ref 78–100)
MICROALBUMIN UR-MCNC: 5230 MG/L
MICROALBUMIN/CREAT UR: 4394.96 MG/G CR (ref 0–17)
MONOCYTES # BLD AUTO: 0.4 10E9/L (ref 0–1.3)
MONOCYTES NFR BLD AUTO: 9.7 %
NEUTROPHILS # BLD AUTO: 1.9 10E9/L (ref 1.6–8.3)
NEUTROPHILS NFR BLD AUTO: 48.7 %
NRBC # BLD AUTO: 0 10*3/UL
NRBC BLD AUTO-RTO: 0 /100
PHOSPHATE SERPL-MCNC: 3.1 MG/DL (ref 2.5–4.5)
PLATELET # BLD AUTO: 213 10E9/L (ref 150–450)
POTASSIUM SERPL-SCNC: 3.8 MMOL/L (ref 3.4–5.3)
PROT UR-MCNC: 6.76 G/L
PROT/CREAT 24H UR: 5.68 G/G CR (ref 0–0.2)
RBC # BLD AUTO: 3.37 10E12/L (ref 4.4–5.9)
SODIUM SERPL-SCNC: 139 MMOL/L (ref 133–144)
WBC # BLD AUTO: 3.9 10E9/L (ref 4–11)

## 2018-12-19 PROCEDURE — 82784 ASSAY IGA/IGD/IGG/IGM EACH: CPT

## 2018-12-19 PROCEDURE — 83883 ASSAY NEPHELOMETRY NOT SPEC: CPT

## 2018-12-19 PROCEDURE — 85025 COMPLETE CBC W/AUTO DIFF WBC: CPT

## 2018-12-19 PROCEDURE — 80069 RENAL FUNCTION PANEL: CPT

## 2018-12-19 PROCEDURE — 82043 UR ALBUMIN QUANTITATIVE: CPT

## 2018-12-19 PROCEDURE — 36415 COLL VENOUS BLD VENIPUNCTURE: CPT

## 2018-12-19 PROCEDURE — 84156 ASSAY OF PROTEIN URINE: CPT

## 2018-12-19 PROCEDURE — 86334 IMMUNOFIX E-PHORESIS SERUM: CPT

## 2018-12-19 PROCEDURE — G0463 HOSPITAL OUTPT CLINIC VISIT: HCPCS | Mod: ZF

## 2018-12-19 RX ORDER — CALCITRIOL 0.25 UG/1
0.25 CAPSULE, LIQUID FILLED ORAL DAILY
Qty: 90 CAPSULE | Refills: 3 | Status: SHIPPED | OUTPATIENT
Start: 2018-12-19

## 2018-12-19 RX ORDER — FERROUS SULFATE 325(65) MG
325 TABLET ORAL 2 TIMES DAILY
Qty: 180 TABLET | Refills: 3 | Status: SHIPPED | OUTPATIENT
Start: 2018-12-19

## 2018-12-19 RX ORDER — METOPROLOL TARTRATE 50 MG
75 TABLET ORAL 2 TIMES DAILY
Qty: 270 TABLET | Refills: 3 | Status: SHIPPED | OUTPATIENT
Start: 2018-12-19 | End: 2019-02-22

## 2018-12-19 RX ORDER — SODIUM BICARBONATE 650 MG/1
650 TABLET ORAL 2 TIMES DAILY
Qty: 120 TABLET | Refills: 3 | Status: SHIPPED | OUTPATIENT
Start: 2018-12-19 | End: 2019-02-22

## 2018-12-19 RX ORDER — LOSARTAN POTASSIUM 50 MG/1
50 TABLET ORAL DAILY
Qty: 90 TABLET | Refills: 3 | Status: SHIPPED | OUTPATIENT
Start: 2018-12-19 | End: 2019-02-22

## 2018-12-19 ASSESSMENT — PAIN SCALES - GENERAL: PAINLEVEL: NO PAIN (0)

## 2018-12-19 ASSESSMENT — MIFFLIN-ST. JEOR: SCORE: 1649.85

## 2018-12-19 NOTE — LETTER
12/19/2018      RE: Austin Walker  [address] [facility] [address]       Nephrology Clinic Visit 12/19/18    Assessment and Plan:       1. Medication questions/compliance -  I did speak to Dr Esparza following our last visit and he was able to order home care. Patient did not answer the phone for services to be arranged. I spoke with Dr Esparza again today and he will try nursing services that do not require the same home bound criteria for home health care. I spoke with patient at our visit recommending nursing to assist with med management and blood pressure checks and he was agreeable.    - the other issue is these pill packs. Patient has found a different local pharmacy and we faxed over new Rxs for Losartan, Lopressor, Bicarb, Calcitriol.    - PCP contact info: ( Dr Celso Esparza - [place] phone [phone], fax [phone])).       2. CKD4 w/proteinuria - Creat 2.5 today, eGFR 25 ml/mn. UPCR has jumped further to 5.6 g/gCr from to 4.91 g/gCr despite restarting ARB in Sept. Previously 2.1 g/gCr. No retention found during Urology evaluation in Sept 2018. On vesicare and Mirabegron by Urology.   Etiology of CKD is bx proven DM/HTN related CKD   - Was just seen by his Oncologist this month for f/u of his MGUS w/o new findings. Currently Kappa/lambda ratio has risen from 2.0 one year ago to 4.1 today. Peak was 8.5 in 2015. His immunofixation is pending. Will contact his Oncologist for review   - Creat has been relatively stable since 2016   - Would refer to [facility] if renal function begins to decline more rapidly   - Does not use NSAIDs   - On renal dosing for Vesicare      3. Volume status -  Has mild edema, no dyspnea. Albumin 3. 1. Weight  87.3 kg, down from 91 kg last visit.   B/P 156-166/ today in clinic. Off Lasix now since August.    -  Would try to avoid restarting diuretics given severe problems with urinary frequency.    - He denies use of salt.           4. HTN -  Uncontrolled. Clinic  blood pressures today; 150-160/ . He did not increase his Lopressor as directed last visit, but is off Minoxidil. I'm not really sure how much lopressor or losartan he is taking because of these pill packs.    Current prescribed regimen:    Losartan 50 mg every day    Lopressor 50 mg bid  - Will change to the following:    New Rxs faxed to [facility] (ph: [phone], fax [phone])    Continue Losartan 50 mg every day    Increase Lopressor  to 75 mg bid   -  Dr Esparza working on nursing services that would include blood pressure monitoring and med management.    - Patient brought in his Omron blood pressure machine today and it is accurate, however, patient can not apply the cuff independently      5. Electrolytes - No acute concerns. K  3.8      6. Acid base - No acute concerns. Bicarb  21.    - Continue Bicarb 650 mg bid      7. BMD - Corrected Ca 9.0, Phos 3.1, albumin 3.1   - Vit D 29 (10/18)   - PTH up to 868 10/18 from 559 (9/18)   - Continue Calcitriol 0.25 mcq every day (Likely has not been taking)      8. Anemia - Hgb 10.7.    - Enrolled in Anemia management   - On iron bid   -Iron studies 10/18: Ferritin 345, IS 44, Fe 87   - Will check on last colonoscopy    9. MGUS - Follows with [facility], Dr Nicholas Deal.     10. DM2 - Very uncontrolled. A1c 11.5% in Nov 2018 and likely contributing to his increase proteinuria.       11. Disposition - RTC one month for f/u.       Assessment and plan was discussed with patient and he voiced his understanding and agreement.      Reason for Visit:  CKD4/HTN f/u      HPI:  Mr Walker is a 74 yo male with CKD4, HTN, DM2, MGUS, previous CVA, noncompliance in today for routine CKD f/u. Last seen by me on  11/21/18. At that visit we continued tapering down Minoxidil and Lopressor was increased. Patient did not increase his Lopressor and has been unable to check his blood pressures. He is no longer taking Minoxidil. Baseline creat in the upper 2-3 range since  8/16. He is non proteinuric.       Interval Hx:   No hospital admissions  Seen by his Oncologist on 12/11/18 w/o new findings  Being followed by Urology for urinary frequency, incontinence. No retention.       ROS:  Patient has not been answering his phone to our nursing staff, so unable to assess his blood pressures.   Patient brought in his blood pressure machine today and he is unable to apply the cuff.   Patient notes that he obtains his medications from a Pharmacy on the [facility] that provides them in pill packs. He finds this difficult due to delays and poor communication regarding medication changes  Voiding frequency is decreased  Does not use salt  Denies dyspnea, CP, abdominal pain. Appetite is OK but only eats one meal/day.       Chronic Health Problems:      CKD4  DM2  H/O CVA  HTN  MGUS  Anemia  PRINCE on CPAP  Severe Urinary frequency/incontinence  HLD      Personal Hx:   Single, lives alone in own home. Has been getting meds in pre packed pill packs. Does not drive, NS, ETOH none, uses walker     Allergies:  Allergies   Allergen Reactions     Lisinopril      Other reaction(s): Renal Failure     Armodafinil Rash       Medications:  Current Outpatient Medications   Medication Sig     calcitRIOL (ROCALTROL) 0.25 MCG capsule Take 1 capsule (0.25 mcg) by mouth daily     ferrous sulfate (FEROSUL) 325 (65 Fe) MG tablet Take 1 tablet (325 mg) by mouth 2 times daily     losartan (COZAAR) 50 MG tablet Take 1 tablet (50 mg) by mouth daily     metoprolol tartrate (LOPRESSOR) 50 MG tablet Take 1.5 tablets (75 mg) by mouth 2 times daily     omeprazole (PRILOSEC) 20 MG DR capsule Take 1 capsule (20 mg) by mouth daily     sodium bicarbonate 650 MG tablet Take 1 tablet (650 mg) by mouth 2 times daily     ASPIRIN EC PO Take 81 mg by mouth daily      atorvastatin (LIPITOR) 20 MG tablet Take 20 mg by mouth every evening 1700     blood glucose monitoring (NOVA SUREFLEX) lancets 3x/day with blood glucose testing      "darbepoetin aaliyah-polysorbate (ARANESP, ALBUMIN FREE,) 300 MCG/ML SOLN Inject 300 mcg Subcutaneous every 14 days As needed for hgb <=11g/dL     fluticasone (FLONASE) 50 MCG/ACT nasal spray Spray 1 spray into both nostrils 2 times daily as needed      gabapentin (NEURONTIN) 300 MG capsule Take 600 mg by mouth 2 times daily     insulin aspart (NOVOLOG PEN) 100 UNIT/ML injection Inject subcutaneous 3 times daily with meals and bedtime Medium Scale less than 70 .................. Refer to Hypoglycemia Treatment Protocol 70 - 149 ......................... No corrective insulin 150 - 199 ....................... 2 units 200 - 249 ....................... 4 units 250 - 299 ....................... 6 units 300 - 349 ....................... 8 units 350 or greater ................ 10 units and notify MD       insulin detemir (LEVEMIR FLEXPEN/FLEXTOUCH) 100 UNIT/ML soln Inject 10 Units Subcutaneous 2 times daily Dose was originally 50 units BID but pt has been cutting back (Patient taking differently: Inject 30 Units Subcutaneous 2 times daily Dose was originally 50 units BID but pt has been cutting back)     insulin pen needle (B-D U/F) 31G X 8 MM USE AS DIRECTED     loratadine (CLARITIN) 10 MG tablet Take 10 mg by mouth daily     metoprolol tartrate (LOPRESSOR) 25 MG tablet Take 3 tablets (75 mg) by mouth 2 times daily     mirabegron (MYRBETRIQ) 25 MG 24 hr tablet Take 1 tablet (25 mg) by mouth daily     Misc Natural Products (OSTEO BI-FLEX ADV TRIPLE ST PO) Take by mouth daily     Multiple Vitamin (MULTI-VITAMINS) TABS Take 1 tablet by mouth daily      order for DME 4 wheeled walker with brakes and a seat.     solifenacin (VESICARE) 5 MG tablet Take 1 tablet (5 mg) by mouth daily     UNABLE TO FIND Take by mouth daily MEDICATION NAME: Brain Peak     No current facility-administered medications for this visit.       Vitals:  /71   Pulse 57   Ht 1.803 m (5' 11\")   Wt 87.3 kg (192 lb 6.4 oz)   SpO2 99%   BMI 26.83 kg/m  "      Exam:  GEN: Elderly male in NAD. Pleasant   CARDIAC: RRR  LUNGS: CTA  ABDOMEN: Soft, NT  EXT: 1+ edema  NEURO: More alert and interactive today.     LABS:   CMP  Recent Labs   Lab Test 12/19/18  1254 11/21/18  1501 10/15/18  1117 09/25/18  1320    135 134 138   POTASSIUM 3.8 5.0 4.2 4.2   CHLORIDE 110* 106 103 110*   CO2 21 19* 21 20   ANIONGAP 8 10 10 9   * 518* 348* 222*   BUN 29 38* 56* 47*   CR 2.50* 2.57* 3.48* 2.82*   GFRESTIMATED 25* 25* 17* 22*   GFRESTBLACK 29* 30* 21* 27*   NAZIA 8.3* 8.4* 8.4* 8.9     Recent Labs   Lab Test 04/04/18  1338 12/20/17  1130 08/02/16  1305 08/02/16 12/11/15  1016 09/14/15  0719   BILITOTAL 0.4 0.5  --   --  0.4 0.9   ALKPHOS 186* 218*  --  141* 207* 168*   ALT 16 18  --  14 36 24   AST 24 24 18  --  29 30     CBC  Recent Labs   Lab Test 12/19/18  1254 11/21/18  1501 10/15/18  1117 09/25/18  1320   HGB 10.7* 10.9* 11.5* 10.3*   WBC 3.9* 4.1 4.3 4.1   RBC 3.37* 3.52* 3.63* 3.16*   HCT 32.0* 34.0* 33.8* 30.1*   MCV 95 97 93 95   MCH 31.8 31.0 31.7 32.6   MCHC 33.4 32.1 34.0 34.2   RDW 14.7 13.7 12.7 12.9    202 210 191     URINE STUDIES  Recent Labs   Lab Test 09/12/18 12/11/15  1038 09/14/15  0958 03/02/15  1407 02/11/15  1045   COLOR Yellow Light Yellow Light Yellow Yellow Light Yellow   APPEARANCE Clear Clear Clear Clear Clear   URINEGLC 250  300* Negative Negative Negative   URINEBILI Neg Negative Negative Negative Negative   URINEKETONE Neg Negative Negative Negative Negative   SG 1.025 1.007 1.010 1.012 1.010   UBLD Small Negative Trace* Small* Negative   URINEPH 5.5 5.5 6.0 6.0 6.0   PROTEIN 300  30* 300* 300* 100*   UROBILINOGEN 0.2  --   --   --   --    NITRITE Neg Negative Negative Negative Negative   LEUKEST Neg Negative Negative Negative Negative   RBCU  --  1 1 4* 3*   WBCU  --  <1 2 <1 1     Recent Labs   Lab Test 12/19/18  1423 11/21/18  1513 10/15/18  1130 09/25/18  1357   UTPG 5.68* 4.91* 2.13* 2.74*     PTH  Recent Labs   Lab Test  10/15/18  1117 09/25/18  1320 06/08/16  1146   PTHI 868* 559* 859*     IRON STUDIES  Recent Labs   Lab Test 10/15/18  1117 09/25/18  1320 08/09/17  1133   IRON 87 70 96   * 195* 191*   IRONSAT 44 36 50*   HUMPHREY 345 250 285       Tiny Duff, NP

## 2018-12-19 NOTE — NURSING NOTE
"Chief Complaint   Patient presents with     RECHECK     CKD      Vital signs:      BP: 156/71 Pulse: 57     SpO2: 99 %     Height: 180.3 cm (5' 11\") Weight: 87.3 kg (192 lb 6.4 oz)  Estimated body mass index is 26.83 kg/m  as calculated from the following:    Height as of this encounter: 1.803 m (5' 11\").    Weight as of this encounter: 87.3 kg (192 lb 6.4 oz).        Alexus Lemons CMA    "

## 2018-12-19 NOTE — PATIENT INSTRUCTIONS
1. Your dose of Metoprolol ( Lopressor should be 75 mg twice per day)  2. Continue Losartan 50 mg daily  3. I will contact your primary doctor regarding home health nursing visits

## 2018-12-20 LAB
IGA SERPL-MCNC: 39 MG/DL (ref 70–380)
IGG SERPL-MCNC: 992 MG/DL (ref 695–1620)
IGM SERPL-MCNC: 30 MG/DL (ref 60–265)
KAPPA LC UR-MCNC: 7.53 MG/DL (ref 0.33–1.94)
KAPPA LC/LAMBDA SER: 4.18 {RATIO} (ref 0.26–1.65)
LAMBDA LC SERPL-MCNC: 1.8 MG/DL (ref 0.57–2.63)
PROT PATTERN SERPL IFE-IMP: ABNORMAL

## 2018-12-20 NOTE — PROGRESS NOTES
Nephrology Clinic Visit 12/19/18    Assessment and Plan:       1. Medication questions/compliance - I did speak to Dr Esparza following our last visit and he was able to order home care. Patient did not answer the phone for services to be arranged. I spoke with Dr Esparza again today and he will try nursing services that do not require the same home bound criteria for home health care. I spoke with patient at our visit recommending nursing to assist with med management and blood pressure checks and he was agreeable.    - the other issue is these pill packs. Patient has found a different local pharmacy and we faxed over new Rxs for Losartan, Lopressor, Bicarb, Calcitriol.    - PCP contact info: ( Dr Celso Esparza - [place] phone [phone], fax [phone])).       2. CKD4 w/proteinuria - Creat 2.5 today, eGFR 25 ml/mn. UPCR has jumped further to 5.6 g/gCr from to 4.91 g/gCr despite restarting ARB in Sept. Previously 2.1 g/gCr. No retention found during Urology evaluation in Sept 2018. On vesicare and Mirabegron by Urology.   Etiology of CKD is bx proven DM/HTN related CKD   - Was just seen by his Oncologist this month for f/u of his MGUS w/o new findings. Currently Kappa/lambda ratio has risen from 2.0 one year ago to 4.1 today. Peak was 8.5 in 2015. His immunofixation is pending. Will contact his Oncologist for review   - Creat has been relatively stable since 2016   - Would refer to [facility] if renal function begins to decline more rapidly   - Does not use NSAIDs   - On renal dosing for Vesicare      3. Volume status - Has mild edema, no dyspnea. Albumin 3.1. Weight 87.3 kg, down from 91 kg last visit.   B/P 156-166/ today in clinic. Off Lasix now since August.    - Would try to avoid restarting diuretics given severe problems with urinary frequency.    - He denies use of salt.           4. HTN - Uncontrolled. Clinic blood pressures today; 150-160/. He did not increase his Lopressor as directed last visit,  but is off Minoxidil. I'm not really sure how much lopressor or losartan he is taking because of these pill packs.    Current prescribed regimen:    Losartan 50 mg every day    Lopressor 50 mg bid  - Will change to the following:    New Rxs faxed to [facility] (ph: [phone], fax [phone])    Continue Losartan 50 mg every day    Increase Lopressor to 75 mg bid   - Dr Esparza working on nursing services that would include blood pressure monitoring and med management.    - Patient brought in his Om[employer] blood pressure machine today and it is accurate, however, patient can not apply the cuff independently      5. Electrolytes - No acute concerns. K 3.8      6. Acid base - No acute concerns. Bicarb 21.    - Continue Bicarb 650 mg bid      7. BMD - Corrected Ca 9.0, Phos 3.1, albumin 3.1   - Vit D 29 (10/18)   - PTH up to 868 10/18 from 559 (9/18)   - Continue Calcitriol 0.25 mcq every day (Likely has not been taking)      8. Anemia - Hgb 10.7.    - Enrolled in Anemia management   - On iron bid   -Iron studies 10/18: Ferritin 345, IS 44, Fe 87   - Will check on last colonoscopy    9. MGUS - Follows with [facility], Dr Nicholas Deal.     10. DM2 - Very uncontrolled. A1c 11.5% in Nov 2018 and likely contributing to his increase proteinuria.       11. Disposition - RTC one month for f/u.       Assessment and plan was discussed with patient and he voiced his understanding and agreement.      Reason for Visit:  CKD4/HTN f/u      HPI:  Mr Walker is a 74 yo male with CKD4, HTN, DM2, MGUS, previous CVA, noncompliance in today for routine CKD f/u. Last seen by me on 11/21/18. At that visit we continued tapering down Minoxidil and Lopressor was increased. Patient did not increase his Lopressor and has been unable to check his blood pressures. He is no longer taking Minoxidil. Baseline creat in the upper 2-3 range since 8/16. He is non proteinuric.       Interval Hx:   No hospital admissions  Seen by his Oncologist on  12/11/18 w/o new findings  Being followed by Urology for urinary frequency, incontinence. No retention.       ROS:  Patient has not been answering his phone to our nursing staff, so unable to assess his blood pressures.   Patient brought in his blood pressure machine today and he is unable to apply the cuff.   Patient notes that he obtains his medications from a Pharmacy on the [facility] that provides them in pill packs. He finds this difficult due to delays and poor communication regarding medication changes  Voiding frequency is decreased  Does not use salt  Denies dyspnea, CP, abdominal pain. Appetite is OK but only eats one meal/day.       Chronic Health Problems:      CKD4  DM2  H/O CVA  HTN  MGUS  Anemia  PRINCE on CPAP  Severe Urinary frequency/incontinence  HLD      Personal Hx:   Single, lives alone in own home. Has been getting meds in pre packed pill packs. Does not drive, NS, ETOH none, uses walker     Allergies:  Allergies   Allergen Reactions     Lisinopril      Other reaction(s): Renal Failure     Armodafinil Rash       Medications:  Current Outpatient Medications   Medication Sig     calcitRIOL (ROCALTROL) 0.25 MCG capsule Take 1 capsule (0.25 mcg) by mouth daily     ferrous sulfate (FEROSUL) 325 (65 Fe) MG tablet Take 1 tablet (325 mg) by mouth 2 times daily     losartan (COZAAR) 50 MG tablet Take 1 tablet (50 mg) by mouth daily     metoprolol tartrate (LOPRESSOR) 50 MG tablet Take 1.5 tablets (75 mg) by mouth 2 times daily     omeprazole (PRILOSEC) 20 MG DR capsule Take 1 capsule (20 mg) by mouth daily     sodium bicarbonate 650 MG tablet Take 1 tablet (650 mg) by mouth 2 times daily     ASPIRIN EC PO Take 81 mg by mouth daily      atorvastatin (LIPITOR) 20 MG tablet Take 20 mg by mouth every evening 1700     blood glucose monitoring (NOVA SUREFLEX) lancets 3x/day with blood glucose testing     darbepoetin aaliyah-polysorbate (ARANESP, ALBUMIN FREE,) 300 MCG/ML SOLN Inject 300 mcg Subcutaneous every 14  "days As needed for hgb <=11g/dL     fluticasone (FLONASE) 50 MCG/ACT nasal spray Spray 1 spray into both nostrils 2 times daily as needed      gabapentin (NEURONTIN) 300 MG capsule Take 600 mg by mouth 2 times daily     insulin aspart (NOVOLOG PEN) 100 UNIT/ML injection Inject subcutaneous 3 times daily with meals and bedtime Medium Scale less than 70 .................. Refer to Hypoglycemia Treatment Protocol 70 - 149 ......................... No corrective insulin 150 - 199 ....................... 2 units 200 - 249 ....................... 4 units 250 - 299 ....................... 6 units 300 - 349 ....................... 8 units 350 or greater ................ 10 units and notify MD       insulin detemir (LEVEMIR FLEXPEN/FLEXTOUCH) 100 UNIT/ML soln Inject 10 Units Subcutaneous 2 times daily Dose was originally 50 units BID but pt has been cutting back (Patient taking differently: Inject 30 Units Subcutaneous 2 times daily Dose was originally 50 units BID but pt has been cutting back)     insulin pen needle (B-D U/F) 31G X 8 MM USE AS DIRECTED     loratadine (CLARITIN) 10 MG tablet Take 10 mg by mouth daily     metoprolol tartrate (LOPRESSOR) 25 MG tablet Take 3 tablets (75 mg) by mouth 2 times daily     mirabegron (MYRBETRIQ) 25 MG 24 hr tablet Take 1 tablet (25 mg) by mouth daily     Misc Natural Products (OSTEO BI-FLEX ADV TRIPLE ST PO) Take by mouth daily     Multiple Vitamin (MULTI-VITAMINS) TABS Take 1 tablet by mouth daily      order for DME 4 wheeled walker with brakes and a seat.     solifenacin (VESICARE) 5 MG tablet Take 1 tablet (5 mg) by mouth daily     UNABLE TO FIND Take by mouth daily MEDICATION NAME: Brain Peak     No current facility-administered medications for this visit.       Vitals:  /71   Pulse 57   Ht 1.803 m (5' 11\")   Wt 87.3 kg (192 lb 6.4 oz)   SpO2 99%   BMI 26.83 kg/m      Exam:  GEN: Elderly male in NAD. Pleasant   CARDIAC: RRR  LUNGS: CTA  ABDOMEN: Soft, NT  EXT: " 1+ edema  NEURO: More alert and interactive today.     LABS:   CMP  Recent Labs   Lab Test 12/19/18  1254 11/21/18  1501 10/15/18  1117 09/25/18  1320    135 134 138   POTASSIUM 3.8 5.0 4.2 4.2   CHLORIDE 110* 106 103 110*   CO2 21 19* 21 20   ANIONGAP 8 10 10 9   * 518* 348* 222*   BUN 29 38* 56* 47*   CR 2.50* 2.57* 3.48* 2.82*   GFRESTIMATED 25* 25* 17* 22*   GFRESTBLACK 29* 30* 21* 27*   NAZIA 8.3* 8.4* 8.4* 8.9     Recent Labs   Lab Test 04/04/18  1338 12/20/17  1130 08/02/16  1305 08/02/16 12/11/15  1016 09/14/15  0719   BILITOTAL 0.4 0.5  --   --  0.4 0.9   ALKPHOS 186* 218*  --  141* 207* 168*   ALT 16 18  --  14 36 24   AST 24 24 18  --  29 30     CBC  Recent Labs   Lab Test 12/19/18  1254 11/21/18  1501 10/15/18  1117 09/25/18  1320   HGB 10.7* 10.9* 11.5* 10.3*   WBC 3.9* 4.1 4.3 4.1   RBC 3.37* 3.52* 3.63* 3.16*   HCT 32.0* 34.0* 33.8* 30.1*   MCV 95 97 93 95   MCH 31.8 31.0 31.7 32.6   MCHC 33.4 32.1 34.0 34.2   RDW 14.7 13.7 12.7 12.9    202 210 191     URINE STUDIES  Recent Labs   Lab Test 09/12/18 12/11/15  1038 09/14/15  0958 03/02/15  1407 02/11/15  1045   COLOR Yellow Light Yellow Light Yellow Yellow Light Yellow   APPEARANCE Clear Clear Clear Clear Clear   URINEGLC 250  300* Negative Negative Negative   URINEBILI Neg Negative Negative Negative Negative   URINEKETONE Neg Negative Negative Negative Negative   SG 1.025 1.007 1.010 1.012 1.010   UBLD Small Negative Trace* Small* Negative   URINEPH 5.5 5.5 6.0 6.0 6.0   PROTEIN 300  30* 300* 300* 100*   UROBILINOGEN 0.2  --   --   --   --    NITRITE Neg Negative Negative Negative Negative   LEUKEST Neg Negative Negative Negative Negative   RBCU  --  1 1 4* 3*   WBCU  --  <1 2 <1 1     Recent Labs   Lab Test 12/19/18  1423 11/21/18  1513 10/15/18  1130 09/25/18  1357   UTPG 5.68* 4.91* 2.13* 2.74*     PTH  Recent Labs   Lab Test 10/15/18  1117 09/25/18  1320 06/08/16  1146   PTHI 868* 559* 859*     IRON STUDIES  Recent Labs   Lab Test  10/15/18  1117 09/25/18  1320 08/09/17  1133   IRON 87 70 96   * 195* 191*   IRONSAT 44 36 50*   HUMPHREY 345 250 285       Tiny Duff, NP

## 2018-12-26 LAB
PROTOCOL CUTOFF: NORMAL
SA1 CELL: NORMAL
SA1 COMMENTS: NORMAL
SA1 HI RISK ABY: NORMAL
SA1 MOD RISK ABY: NORMAL
SA1 TEST METHOD: NORMAL
SA2 CELL: NORMAL
SA2 COMMENTS: NORMAL
SA2 HI RISK ABY UA: NORMAL
SA2 MOD RISK ABY: NORMAL
SA2 TEST METHOD: NORMAL
UNACCEPTABLE ANTIGEN: NORMAL
UNOS CPRA: 0

## 2019-01-11 ENCOUNTER — RECORDS - HEALTHEAST (OUTPATIENT)
Dept: LAB | Facility: CLINIC | Age: 74
End: 2019-01-11

## 2019-01-11 LAB
BASOPHILS # BLD AUTO: 0 THOU/UL (ref 0–0.2)
BASOPHILS NFR BLD AUTO: 0 % (ref 0–2)
EOSINOPHIL # BLD AUTO: 0 THOU/UL (ref 0–0.4)
EOSINOPHIL NFR BLD AUTO: 0 % (ref 0–6)
ERYTHROCYTE [DISTWIDTH] IN BLOOD BY AUTOMATED COUNT: 14.5 % (ref 11–14.5)
FLUAV AG SPEC QL IA: NORMAL
FLUBV AG SPEC QL IA: NORMAL
HCT VFR BLD AUTO: 34.9 % (ref 40–54)
HGB BLD-MCNC: 11.1 G/DL (ref 14–18)
LYMPHOCYTES # BLD AUTO: 0.7 THOU/UL (ref 0.8–4.4)
LYMPHOCYTES NFR BLD AUTO: 7 % (ref 20–40)
MCH RBC QN AUTO: 31.6 PG (ref 27–34)
MCHC RBC AUTO-ENTMCNC: 31.8 G/DL (ref 32–36)
MCV RBC AUTO: 99 FL (ref 80–100)
MONOCYTES # BLD AUTO: 0.9 THOU/UL (ref 0–0.9)
MONOCYTES NFR BLD AUTO: 9 % (ref 2–10)
NEUTROPHILS # BLD AUTO: 8.2 THOU/UL (ref 2–7.7)
NEUTROPHILS NFR BLD AUTO: 83 % (ref 50–70)
PLATELET # BLD AUTO: 182 THOU/UL (ref 140–440)
PMV BLD AUTO: 12.2 FL (ref 8.5–12.5)
RBC # BLD AUTO: 3.51 MILL/UL (ref 4.4–6.2)
WBC: 9.9 THOU/UL (ref 4–11)

## 2019-01-16 DIAGNOSIS — N18.4 CKD (CHRONIC KIDNEY DISEASE) STAGE 4, GFR 15-29 ML/MIN (H): Primary | ICD-10-CM

## 2019-01-21 ENCOUNTER — AMBULATORY - HEALTHEAST (OUTPATIENT)
Dept: ADMINISTRATIVE | Facility: CLINIC | Age: 74
End: 2019-01-21

## 2019-01-21 ENCOUNTER — OFFICE VISIT - HEALTHEAST (OUTPATIENT)
Dept: GERIATRICS | Facility: CLINIC | Age: 74
End: 2019-01-21

## 2019-01-21 DIAGNOSIS — N18.30 STAGE 3 CHRONIC KIDNEY DISEASE (H): ICD-10-CM

## 2019-01-21 DIAGNOSIS — Z79.4 TYPE 2 DIABETES MELLITUS WITH OTHER SPECIFIED COMPLICATION, WITH LONG-TERM CURRENT USE OF INSULIN (H): ICD-10-CM

## 2019-01-21 DIAGNOSIS — D63.1 ANEMIA DUE TO CHRONIC KIDNEY DISEASE, UNSPECIFIED CKD STAGE: ICD-10-CM

## 2019-01-21 DIAGNOSIS — E11.69 TYPE 2 DIABETES MELLITUS WITH OTHER SPECIFIED COMPLICATION, WITH LONG-TERM CURRENT USE OF INSULIN (H): ICD-10-CM

## 2019-01-21 DIAGNOSIS — I63.9 CEREBROVASCULAR ACCIDENT (CVA), UNSPECIFIED MECHANISM (H): ICD-10-CM

## 2019-01-21 DIAGNOSIS — N18.9 ANEMIA DUE TO CHRONIC KIDNEY DISEASE, UNSPECIFIED CKD STAGE: ICD-10-CM

## 2019-01-21 DIAGNOSIS — L03.114 CELLULITIS OF LEFT UPPER EXTREMITY: ICD-10-CM

## 2019-01-23 ENCOUNTER — OFFICE VISIT - HEALTHEAST (OUTPATIENT)
Dept: GERIATRICS | Facility: CLINIC | Age: 74
End: 2019-01-23

## 2019-01-23 DIAGNOSIS — N18.30 STAGE 3 CHRONIC KIDNEY DISEASE (H): ICD-10-CM

## 2019-01-23 DIAGNOSIS — R41.0 DELIRIUM: ICD-10-CM

## 2019-01-23 DIAGNOSIS — I63.9 CEREBROVASCULAR ACCIDENT (CVA), UNSPECIFIED MECHANISM (H): ICD-10-CM

## 2019-01-23 DIAGNOSIS — E11.69 TYPE 2 DIABETES MELLITUS WITH OTHER SPECIFIED COMPLICATION, WITH LONG-TERM CURRENT USE OF INSULIN (H): ICD-10-CM

## 2019-01-23 DIAGNOSIS — Z79.4 TYPE 2 DIABETES MELLITUS WITH OTHER SPECIFIED COMPLICATION, WITH LONG-TERM CURRENT USE OF INSULIN (H): ICD-10-CM

## 2019-01-23 DIAGNOSIS — Z65.8: ICD-10-CM

## 2019-01-23 DIAGNOSIS — I10 ESSENTIAL HYPERTENSION: ICD-10-CM

## 2019-01-24 ENCOUNTER — RECORDS - HEALTHEAST (OUTPATIENT)
Dept: LAB | Facility: CLINIC | Age: 74
End: 2019-01-24

## 2019-01-24 LAB
ANION GAP SERPL CALCULATED.3IONS-SCNC: 10 MMOL/L (ref 5–18)
BASOPHILS # BLD AUTO: 0 THOU/UL (ref 0–0.2)
BASOPHILS NFR BLD AUTO: 0 % (ref 0–2)
BUN SERPL-MCNC: 40 MG/DL (ref 8–28)
CALCIUM SERPL-MCNC: 8.6 MG/DL (ref 8.5–10.5)
CHLORIDE BLD-SCNC: 108 MMOL/L (ref 98–107)
CO2 SERPL-SCNC: 19 MMOL/L (ref 22–31)
CREAT SERPL-MCNC: 2.75 MG/DL (ref 0.7–1.3)
EOSINOPHIL # BLD AUTO: 0.2 THOU/UL (ref 0–0.4)
EOSINOPHIL NFR BLD AUTO: 3 % (ref 0–6)
ERYTHROCYTE [DISTWIDTH] IN BLOOD BY AUTOMATED COUNT: 13.6 % (ref 11–14.5)
GFR SERPL CREATININE-BSD FRML MDRD: 23 ML/MIN/1.73M2
GLUCOSE BLD-MCNC: 205 MG/DL (ref 70–125)
HCT VFR BLD AUTO: 29 % (ref 40–54)
HGB BLD-MCNC: 9.2 G/DL (ref 14–18)
LYMPHOCYTES # BLD AUTO: 1.7 THOU/UL (ref 0.8–4.4)
LYMPHOCYTES NFR BLD AUTO: 23 % (ref 20–40)
MAGNESIUM SERPL-MCNC: 1.9 MG/DL (ref 1.8–2.6)
MCH RBC QN AUTO: 31.6 PG (ref 27–34)
MCHC RBC AUTO-ENTMCNC: 31.7 G/DL (ref 32–36)
MCV RBC AUTO: 100 FL (ref 80–100)
MONOCYTES # BLD AUTO: 0.6 THOU/UL (ref 0–0.9)
MONOCYTES NFR BLD AUTO: 8 % (ref 2–10)
NEUTROPHILS # BLD AUTO: 4.9 THOU/UL (ref 2–7.7)
NEUTROPHILS NFR BLD AUTO: 66 % (ref 50–70)
PLATELET # BLD AUTO: 331 THOU/UL (ref 140–440)
PMV BLD AUTO: 11.5 FL (ref 8.5–12.5)
POTASSIUM BLD-SCNC: 7.4 MMOL/L (ref 3.5–5)
RBC # BLD AUTO: 2.91 MILL/UL (ref 4.4–6.2)
SODIUM SERPL-SCNC: 137 MMOL/L (ref 136–145)
WBC: 7.5 THOU/UL (ref 4–11)

## 2019-02-04 ENCOUNTER — RECORDS - HEALTHEAST (OUTPATIENT)
Dept: LAB | Facility: CLINIC | Age: 74
End: 2019-02-04

## 2019-02-04 LAB
ANION GAP SERPL CALCULATED.3IONS-SCNC: 8 MMOL/L (ref 5–18)
BUN SERPL-MCNC: 51 MG/DL (ref 8–28)
CALCIUM SERPL-MCNC: 8.7 MG/DL (ref 8.5–10.5)
CHLORIDE BLD-SCNC: 113 MMOL/L (ref 98–107)
CO2 SERPL-SCNC: 20 MMOL/L (ref 22–31)
CREAT SERPL-MCNC: 3.29 MG/DL (ref 0.7–1.3)
GFR SERPL CREATININE-BSD FRML MDRD: 19 ML/MIN/1.73M2
GLUCOSE BLD-MCNC: 67 MG/DL (ref 70–125)
HGB BLD-MCNC: 7.3 G/DL (ref 14–18)
POTASSIUM BLD-SCNC: 5.2 MMOL/L (ref 3.5–5)
SODIUM SERPL-SCNC: 141 MMOL/L (ref 136–145)

## 2019-02-05 ENCOUNTER — AMBULATORY - HEALTHEAST (OUTPATIENT)
Dept: GERIATRICS | Facility: CLINIC | Age: 74
End: 2019-02-05

## 2019-02-05 ENCOUNTER — RECORDS - HEALTHEAST (OUTPATIENT)
Dept: LAB | Facility: CLINIC | Age: 74
End: 2019-02-05

## 2019-02-05 LAB
ANION GAP SERPL CALCULATED.3IONS-SCNC: 8 MMOL/L (ref 5–18)
BUN SERPL-MCNC: 51 MG/DL (ref 8–28)
CALCIUM SERPL-MCNC: 9 MG/DL (ref 8.5–10.5)
CHLORIDE BLD-SCNC: 113 MMOL/L (ref 98–107)
CO2 SERPL-SCNC: 21 MMOL/L (ref 22–31)
CREAT SERPL-MCNC: 3.32 MG/DL (ref 0.7–1.3)
GFR SERPL CREATININE-BSD FRML MDRD: 18 ML/MIN/1.73M2
GLUCOSE BLD-MCNC: 49 MG/DL (ref 70–125)
POTASSIUM BLD-SCNC: 5.3 MMOL/L (ref 3.5–5)
SODIUM SERPL-SCNC: 142 MMOL/L (ref 136–145)

## 2019-02-07 ENCOUNTER — RECORDS - HEALTHEAST (OUTPATIENT)
Dept: LAB | Facility: CLINIC | Age: 74
End: 2019-02-07

## 2019-02-08 LAB
ANION GAP SERPL CALCULATED.3IONS-SCNC: 5 MMOL/L (ref 5–18)
BUN SERPL-MCNC: 53 MG/DL (ref 8–28)
CALCIUM SERPL-MCNC: 8.9 MG/DL (ref 8.5–10.5)
CHLORIDE BLD-SCNC: 114 MMOL/L (ref 98–107)
CO2 SERPL-SCNC: 23 MMOL/L (ref 22–31)
CREAT SERPL-MCNC: 3.15 MG/DL (ref 0.7–1.3)
GFR SERPL CREATININE-BSD FRML MDRD: 20 ML/MIN/1.73M2
GLUCOSE BLD-MCNC: 42 MG/DL (ref 70–125)
HGB BLD-MCNC: 7.3 G/DL (ref 14–18)
POTASSIUM BLD-SCNC: 5.5 MMOL/L (ref 3.5–5)
SODIUM SERPL-SCNC: 142 MMOL/L (ref 136–145)

## 2019-02-11 ENCOUNTER — RECORDS - HEALTHEAST (OUTPATIENT)
Dept: LAB | Facility: CLINIC | Age: 74
End: 2019-02-11

## 2019-02-12 LAB
ANION GAP SERPL CALCULATED.3IONS-SCNC: 8 MMOL/L (ref 5–18)
BUN SERPL-MCNC: 60 MG/DL (ref 8–28)
CALCIUM SERPL-MCNC: 8.6 MG/DL (ref 8.5–10.5)
CHLORIDE BLD-SCNC: 114 MMOL/L (ref 98–107)
CO2 SERPL-SCNC: 20 MMOL/L (ref 22–31)
CREAT SERPL-MCNC: 3.5 MG/DL (ref 0.7–1.3)
ERYTHROCYTE [DISTWIDTH] IN BLOOD BY AUTOMATED COUNT: 15.3 % (ref 11–14.5)
GFR SERPL CREATININE-BSD FRML MDRD: 17 ML/MIN/1.73M2
GLUCOSE BLD-MCNC: 91 MG/DL (ref 70–125)
HCT VFR BLD AUTO: 22.6 % (ref 40–54)
HGB BLD-MCNC: 7.1 G/DL (ref 14–18)
MCH RBC QN AUTO: 31.6 PG (ref 27–34)
MCHC RBC AUTO-ENTMCNC: 31.4 G/DL (ref 32–36)
MCV RBC AUTO: 100 FL (ref 80–100)
PLATELET # BLD AUTO: 185 THOU/UL (ref 140–440)
PMV BLD AUTO: 11.6 FL (ref 8.5–12.5)
POTASSIUM BLD-SCNC: 5.2 MMOL/L (ref 3.5–5)
RBC # BLD AUTO: 2.25 MILL/UL (ref 4.4–6.2)
SODIUM SERPL-SCNC: 142 MMOL/L (ref 136–145)
WBC: 4.4 THOU/UL (ref 4–11)

## 2019-02-14 ENCOUNTER — RECORDS - HEALTHEAST (OUTPATIENT)
Dept: LAB | Facility: CLINIC | Age: 74
End: 2019-02-14

## 2019-02-15 LAB
ANION GAP SERPL CALCULATED.3IONS-SCNC: 7 MMOL/L (ref 5–18)
BUN SERPL-MCNC: 57 MG/DL (ref 8–28)
CALCIUM SERPL-MCNC: 8.8 MG/DL (ref 8.5–10.5)
CHLORIDE BLD-SCNC: 118 MMOL/L (ref 98–107)
CO2 SERPL-SCNC: 20 MMOL/L (ref 22–31)
CREAT SERPL-MCNC: 3.65 MG/DL (ref 0.7–1.3)
GFR SERPL CREATININE-BSD FRML MDRD: 17 ML/MIN/1.73M2
GLUCOSE BLD-MCNC: 178 MG/DL (ref 70–125)
POTASSIUM BLD-SCNC: 5 MMOL/L (ref 3.5–5)
SODIUM SERPL-SCNC: 145 MMOL/L (ref 136–145)

## 2019-02-18 ENCOUNTER — RECORDS - HEALTHEAST (OUTPATIENT)
Dept: LAB | Facility: CLINIC | Age: 74
End: 2019-02-18

## 2019-02-19 LAB
ANION GAP SERPL CALCULATED.3IONS-SCNC: 10 MMOL/L (ref 5–18)
BUN SERPL-MCNC: 57 MG/DL (ref 8–28)
CALCIUM SERPL-MCNC: 9.4 MG/DL (ref 8.5–10.5)
CHLORIDE BLD-SCNC: 118 MMOL/L (ref 98–107)
CO2 SERPL-SCNC: 15 MMOL/L (ref 22–31)
CREAT SERPL-MCNC: 3.54 MG/DL (ref 0.7–1.3)
GFR SERPL CREATININE-BSD FRML MDRD: 17 ML/MIN/1.73M2
GLUCOSE BLD-MCNC: 62 MG/DL (ref 70–125)
POTASSIUM BLD-SCNC: 5.7 MMOL/L (ref 3.5–5)
SODIUM SERPL-SCNC: 143 MMOL/L (ref 136–145)

## 2019-02-20 ENCOUNTER — RECORDS - HEALTHEAST (OUTPATIENT)
Dept: LAB | Facility: CLINIC | Age: 74
End: 2019-02-20

## 2019-02-20 LAB
ANION GAP SERPL CALCULATED.3IONS-SCNC: 7 MMOL/L (ref 5–18)
BUN SERPL-MCNC: 57 MG/DL (ref 8–28)
CALCIUM SERPL-MCNC: 8.6 MG/DL (ref 8.5–10.5)
CHLORIDE BLD-SCNC: 116 MMOL/L (ref 98–107)
CO2 SERPL-SCNC: 20 MMOL/L (ref 22–31)
CREAT SERPL-MCNC: 3.55 MG/DL (ref 0.7–1.3)
GFR SERPL CREATININE-BSD FRML MDRD: 17 ML/MIN/1.73M2
GLUCOSE BLD-MCNC: 98 MG/DL (ref 70–125)
POTASSIUM BLD-SCNC: 5.1 MMOL/L (ref 3.5–5)
POTASSIUM BLD-SCNC: 5.1 MMOL/L (ref 3.5–5)
SODIUM SERPL-SCNC: 143 MMOL/L (ref 136–145)

## 2019-02-21 DIAGNOSIS — N18.4 CKD (CHRONIC KIDNEY DISEASE) STAGE 4, GFR 15-29 ML/MIN (H): Primary | ICD-10-CM

## 2019-02-22 ENCOUNTER — OFFICE VISIT (OUTPATIENT)
Dept: NEPHROLOGY | Facility: CLINIC | Age: 74
End: 2019-02-22
Payer: MEDICARE

## 2019-02-22 ENCOUNTER — RECORDS - HEALTHEAST (OUTPATIENT)
Dept: LAB | Facility: CLINIC | Age: 74
End: 2019-02-22

## 2019-02-22 VITALS — SYSTOLIC BLOOD PRESSURE: 165 MMHG | OXYGEN SATURATION: 96 % | HEART RATE: 53 BPM | DIASTOLIC BLOOD PRESSURE: 74 MMHG

## 2019-02-22 DIAGNOSIS — N18.4 CKD (CHRONIC KIDNEY DISEASE) STAGE 4, GFR 15-29 ML/MIN (H): Primary | ICD-10-CM

## 2019-02-22 DIAGNOSIS — D50.9 IRON DEFICIENCY ANEMIA, UNSPECIFIED IRON DEFICIENCY ANEMIA TYPE: ICD-10-CM

## 2019-02-22 DIAGNOSIS — E87.20 METABOLIC ACIDOSIS: ICD-10-CM

## 2019-02-22 DIAGNOSIS — N18.4 CKD (CHRONIC KIDNEY DISEASE) STAGE 4, GFR 15-29 ML/MIN (H): ICD-10-CM

## 2019-02-22 DIAGNOSIS — I10 ESSENTIAL HYPERTENSION: ICD-10-CM

## 2019-02-22 DIAGNOSIS — E87.70 HYPERVOLEMIA, UNSPECIFIED HYPERVOLEMIA TYPE: ICD-10-CM

## 2019-02-22 LAB
ALBUMIN SERPL-MCNC: 2.8 G/DL (ref 3.4–5)
ANION GAP SERPL CALCULATED.3IONS-SCNC: 11 MMOL/L (ref 3–14)
ANION GAP SERPL CALCULATED.3IONS-SCNC: 8 MMOL/L (ref 5–18)
BASOPHILS # BLD AUTO: 0 10E9/L (ref 0–0.2)
BASOPHILS NFR BLD AUTO: 0.4 %
BUN SERPL-MCNC: 53 MG/DL (ref 8–28)
BUN SERPL-MCNC: 54 MG/DL (ref 7–30)
CALCIUM SERPL-MCNC: 7.8 MG/DL (ref 8.5–10.1)
CALCIUM SERPL-MCNC: 8.3 MG/DL (ref 8.5–10.5)
CHLORIDE BLD-SCNC: 116 MMOL/L (ref 98–107)
CHLORIDE SERPL-SCNC: 114 MMOL/L (ref 94–109)
CO2 SERPL-SCNC: 19 MMOL/L (ref 20–32)
CO2 SERPL-SCNC: 20 MMOL/L (ref 22–31)
CREAT SERPL-MCNC: 3.25 MG/DL (ref 0.66–1.25)
CREAT SERPL-MCNC: 3.44 MG/DL (ref 0.7–1.3)
DIFFERENTIAL METHOD BLD: ABNORMAL
EOSINOPHIL # BLD AUTO: 0.4 10E9/L (ref 0–0.7)
EOSINOPHIL NFR BLD AUTO: 6.7 %
ERYTHROCYTE [DISTWIDTH] IN BLOOD BY AUTOMATED COUNT: 15.2 % (ref 10–15)
FERRITIN SERPL-MCNC: 445 NG/ML (ref 26–388)
GFR SERPL CREATININE-BSD FRML MDRD: 18 ML/MIN/1.73M2
GFR SERPL CREATININE-BSD FRML MDRD: 18 ML/MIN/{1.73_M2}
GLUCOSE BLD-MCNC: 123 MG/DL (ref 70–125)
GLUCOSE SERPL-MCNC: 193 MG/DL (ref 70–99)
HCT VFR BLD AUTO: 26.1 % (ref 40–53)
HGB BLD-MCNC: 8.4 G/DL (ref 13.3–17.7)
IMM GRANULOCYTES # BLD: 0 10E9/L (ref 0–0.4)
IMM GRANULOCYTES NFR BLD: 0.2 %
IRON SATN MFR SERPL: 37 % (ref 15–46)
IRON SERPL-MCNC: 75 UG/DL (ref 35–180)
LYMPHOCYTES # BLD AUTO: 1.8 10E9/L (ref 0.8–5.3)
LYMPHOCYTES NFR BLD AUTO: 31 %
MCH RBC QN AUTO: 32.9 PG (ref 26.5–33)
MCHC RBC AUTO-ENTMCNC: 32.2 G/DL (ref 31.5–36.5)
MCV RBC AUTO: 102 FL (ref 78–100)
MONOCYTES # BLD AUTO: 0.5 10E9/L (ref 0–1.3)
MONOCYTES NFR BLD AUTO: 9.1 %
NEUTROPHILS # BLD AUTO: 3 10E9/L (ref 1.6–8.3)
NEUTROPHILS NFR BLD AUTO: 52.6 %
NRBC # BLD AUTO: 0 10*3/UL
NRBC BLD AUTO-RTO: 0 /100
PHOSPHATE SERPL-MCNC: 4.8 MG/DL (ref 2.5–4.5)
PLATELET # BLD AUTO: 126 10E9/L (ref 150–450)
POTASSIUM BLD-SCNC: 5.2 MMOL/L (ref 3.5–5)
POTASSIUM SERPL-SCNC: 5.5 MMOL/L (ref 3.4–5.3)
RBC # BLD AUTO: 2.55 10E12/L (ref 4.4–5.9)
SODIUM SERPL-SCNC: 143 MMOL/L (ref 133–144)
SODIUM SERPL-SCNC: 144 MMOL/L (ref 136–145)
TIBC SERPL-MCNC: 203 UG/DL (ref 240–430)
WBC # BLD AUTO: 5.7 10E9/L (ref 4–11)

## 2019-02-22 PROCEDURE — 82728 ASSAY OF FERRITIN: CPT

## 2019-02-22 PROCEDURE — 80069 RENAL FUNCTION PANEL: CPT

## 2019-02-22 PROCEDURE — 85025 COMPLETE CBC W/AUTO DIFF WBC: CPT

## 2019-02-22 PROCEDURE — 83540 ASSAY OF IRON: CPT

## 2019-02-22 PROCEDURE — 36415 COLL VENOUS BLD VENIPUNCTURE: CPT

## 2019-02-22 PROCEDURE — 83550 IRON BINDING TEST: CPT

## 2019-02-22 RX ORDER — CETIRIZINE HYDROCHLORIDE 5 MG/1
5 TABLET ORAL DAILY
COMMUNITY

## 2019-02-22 RX ORDER — MINOXIDIL 2.5 MG/1
2.5 TABLET ORAL DAILY
COMMUNITY

## 2019-02-22 RX ORDER — DOXAZOSIN 2 MG/1
4 TABLET ORAL AT BEDTIME
COMMUNITY

## 2019-02-22 RX ORDER — BENZOCAINE/MENTHOL 6 MG-10 MG
LOZENGE MUCOUS MEMBRANE PRN
COMMUNITY

## 2019-02-22 RX ORDER — DEXTROAMPHETAMINE SACCHARATE, AMPHETAMINE ASPARTATE, DEXTROAMPHETAMINE SULFATE AND AMPHETAMINE SULFATE 5; 5; 5; 5 MG/1; MG/1; MG/1; MG/1
20 TABLET ORAL DAILY
COMMUNITY

## 2019-02-22 RX ORDER — SODIUM BICARBONATE 650 MG/1
1950 TABLET ORAL 3 TIMES DAILY
Qty: 120 TABLET | Refills: 3
Start: 2019-02-22 | End: 2019-04-11

## 2019-02-22 RX ORDER — LIDOCAINE 50 MG/G
1 PATCH TOPICAL EVERY 24 HOURS
COMMUNITY

## 2019-02-22 RX ORDER — HYDRALAZINE HYDROCHLORIDE 10 MG/1
10 TABLET, FILM COATED ORAL 3 TIMES DAILY
COMMUNITY
End: 2019-02-22

## 2019-02-22 RX ORDER — FUROSEMIDE 40 MG
40 TABLET ORAL DAILY
COMMUNITY
End: 2019-04-11

## 2019-02-22 RX ORDER — ACETAMINOPHEN 500 MG
500-1000 TABLET ORAL EVERY 6 HOURS PRN
COMMUNITY

## 2019-02-22 RX ORDER — TRAZODONE HYDROCHLORIDE 50 MG/1
25 TABLET, FILM COATED ORAL AT BEDTIME
COMMUNITY

## 2019-02-22 NOTE — PATIENT INSTRUCTIONS
1. Increase Doxazosin to 4 mg HS  2. Start Amlodipine 5 mg every day  3. Daily blood pressures. Please bring to next visit

## 2019-02-22 NOTE — LETTER
2/22/2019     RE: Austin Walker  [address] [facility] [address]     Dear Colleague,    Thank you for referring your patient, Austin Walker, to the [facility] NEPHROLOGY at [facility]. Please see a copy of my visit note below.    Chief Complaint   Patient presents with     RECHECK     CKD f/u     Blood pressure 170/76, pulse 53, SpO2 96 %.    Mariela Cannon [facility]       Nephrology Clinic Visit 2/22/19    Assessment and Plan:       1. Medication compliance - I suspect hospital admission with K of 7.2 related to confusion regarding his medications. I have sent a message to his TCU asking that when patient is discharged home care is provided for nursing medication management/blood pressure checks, etc. Patient agreeable to home care today during our discussion.     - the other issue is how his medications are being dispensed at home. He did have pill packs coming from an [facility] pharmacy which caused delay and confusion with any medication changes. He had decided to switch to a local pharmacy at the time of our last visit. This will have to be clarified at discharge from TCU.    - PCP contact info: ( Dr Celso Esparza - [place] phone [phone], fax [phone])).       2. CKD4 w/proteinuria - Creat up to 3.2 today, eGFR 18 ml/mn. UPCR had jumped significantly last visit. Will recheck next visit. This rise was in the setting of very poorly controlled DM with A1c of 11.5% in November. No retention found during Urology evaluation in Sept 2018. No longer on vesicare or Mirabegron given delirium.   Etiology of CKD is bx proven DM/HTN related CKD   - Creat has been relatively stable since 2016, but now slowly rising in the setting of uncontrolled HTN and DM   - Will have RRT discussion next visit   - Does not use NSAIDs   - Blood pressure goal < 140/80   - A1c goal 7%      3. Volume status - Has significant edema, no dyspnea in setting of high dose sodium bicarb and  Minoxidil.  Albumin 2.8. No weight today. Was 87.3 kg last visit. B/P 160-170/ today in clinic. On Lasix 40 mg qd   -  Really needs to increase Lasix dose, but will hold off for now   - Renal diet ordered          4. HTN - Uncontrolled. Clinic blood pressures today; 160-170/. He has significant edema.    Current prescribed regimen:    Doxazosin 2 mg HS   Furosemide 40 mg qd   Lopressor 75 mg bid   Minoxidil 5 mg qd  - Add Amlodipine 5 mg every day  - Increase Doxazosin to 4 mg HS   -Will need increase in Furosemide next visit    - Would like to discontinue Minoxidil and increase Doxazosin/Amlodipine/furosemide.   - Check blood pressures daily and record. Bring in to next visit     - Patient has brought in his Omron blood pressure machine and it is accurate, however, patient can not apply the cuff independently      5. Electrolytes - Borderline hyperkalemia. K 5.5.    - Have ordered renal diet      6. Acid base - Bicarb 19 despite Bicarb 1950 mg TID.    - Continue current dose      7. BMD - Corrected Ca  8.7, Phos 4.8, albumin 2.8   - Vit D 29 (10/18)   - PTH up to 868 10/18 from 559 (9/18)   - Continue Calcitriol 0.25 mcq every day      8. Anemia - Hgb 8.4    - Will re enroll in Anemia management following discharge from TCU   - On iron bid   -Iron studies 2/19: Ferritin 445, IS 75, Fe 37   - Will check on last colonoscopy     9. MGUS - Follows with [facility], Dr Nicholas Deal.      10. DM2 - Very uncontrolled. A1c 11.5% in Nov 2018, improved to 9.1% in January. Likely contributing to his increase proteinuria.  On insulin      11. Disposition - RTC one month for f/u.       Assessment and plan was discussed with patient and he voiced his understanding and agreement.      Reason for Visit:  CKD4/HTN f/u      HPI:  Mr Walker is a 74 yo male with CKD4, HTN, DM2, MGUS, previous CVA, noncompliance in today for routine CKD f/u. Last seen by me on 12/19/18. We had tapered off Minoxidil by that visit and were  adjusting his other antihypertensives. Unfortunately have not seen patient since that visit given recent hospital admissions. Baseline creat in the upper 2-3 range since 8/16. He is non proteinuric. Patent has significant problems with non compliance. Have been working with his PCP to try to get home care for nursing med management.       Interval Hx:   Hospital admission 1/24-2/1/19 for hyperkalemia ( K 7.2) , AMS  Current recovering at [facility] [phone]        ROS:  Patient recovering from most recent hospital admission at TCU. He is hoping to be discharged soon. He is agreeable to home care. Patient's medication list has been significantly changed since our last visit with resumption of Minoxidil and Furosemide and discontinuation of Losartan, Vesicare and Myrbetriq. Patient denies significant incontinence/urinary frequency.   He is on large doses of oral bicarb.   He has developed significant edema  Not following a renal diet at the TCU  Denies dyspnea, CP, abdominal pain. Appetite is OK       Chronic Health Problems:      CKD4/5  DM2  H/O CVA  HTN  MGUS  Anemia  PRINCE on CPAP  Severe Urinary frequency/incontinence  HLD      Personal Hx:   Single, lives alone in own home. Does not drive, NS, ETOH none, uses walker     Allergies:  Allergies   Allergen Reactions     Lisinopril      Other reaction(s): Renal Failure     Armodafinil Rash       Medications:  Current Outpatient Medications   Medication Sig     acetaminophen (TYLENOL) 500 MG tablet Take 500-1,000 mg by mouth every 6 hours as needed for mild pain     amphetamine-dextroamphetamine (ADDERALL) 20 MG tablet Take 20 mg by mouth daily     atorvastatin (LIPITOR) 20 MG tablet Take 20 mg by mouth every evening 1700     calcitRIOL (ROCALTROL) 0.25 MCG capsule Take 1 capsule (0.25 mcg) by mouth daily     cetirizine (ZYRTEC) 5 MG tablet Take 5 mg by mouth daily     doxazosin (CARDURA) 2 MG tablet Take 4 mg by mouth At Bedtime     ferrous sulfate  (FEROSUL) 325 (65 Fe) MG tablet Take 1 tablet (325 mg) by mouth 2 times daily     fluticasone (FLONASE) 50 MCG/ACT nasal spray Spray 1 spray into both nostrils 2 times daily as needed      furosemide (LASIX) 40 MG tablet Take 40 mg by mouth daily     gabapentin (NEURONTIN) 300 MG capsule Take 600 mg by mouth 2 times daily     hydrocortisone (CORTAID) 1 % external cream Apply topically as needed     insulin aspart (NOVOLOG PEN) 100 UNIT/ML injection Inject subcutaneous 3 times daily with meals and bedtime Medium Scale less than 70 .................. Refer to Hypoglycemia Treatment Protocol 70 - 149 ......................... No corrective insulin 150 - 199 ....................... 2 units 200 - 249 ....................... 4 units 250 - 299 ....................... 6 units 300 - 349 ....................... 8 units 350 or greater ................ 10 units and notify MD       insulin detemir (LEVEMIR FLEXPEN/FLEXTOUCH) 100 UNIT/ML soln Inject 10 Units Subcutaneous 2 times daily Dose was originally 50 units BID but pt has been cutting back (Patient taking differently: Inject 30 Units Subcutaneous 2 times daily Dose was originally 50 units BID but pt has been cutting back)     lidocaine (LIDODERM) 5 % patch Place 1 patch onto the skin every 24 hours     metoprolol tartrate (LOPRESSOR) 25 MG tablet Take 3 tablets (75 mg) by mouth 2 times daily     minoxidil (LONITEN) 2.5 MG tablet Take 5 mg by mouth daily     Multiple Vitamin (MULTI-VITAMINS) TABS Take 1 tablet by mouth daily      omeprazole (PRILOSEC) 20 MG DR capsule Take 1 capsule (20 mg) by mouth daily     sodium bicarbonate 650 MG tablet Take 3 tablets (1,950 mg) by mouth 3 times daily     traZODone (DESYREL) 50 MG tablet Take 25 mg by mouth At Bedtime     ASPIRIN EC PO Take 81 mg by mouth daily      blood glucose monitoring (NOVA SUREFLEX) lancets 3x/day with blood glucose testing     darbepoetin aaliyah-polysorbate (ARANESP, ALBUMIN FREE,) 300 MCG/ML SOLN Inject 300 mcg  Subcutaneous every 14 days As needed for hgb <=11g/dL     insulin pen needle (B-D U/F) 31G X 8 MM USE AS DIRECTED     loratadine (CLARITIN) 10 MG tablet Take 10 mg by mouth daily     Misc Natural Products (OSTEO BI-FLEX ADV TRIPLE ST PO) Take by mouth daily     order for DME 4 wheeled walker with brakes and a seat.     UNABLE TO FIND Take by mouth daily MEDICATION NAME: Brain Peak     No current facility-administered medications for this visit.       Vitals:  /74   Pulse 53   SpO2 96%     Exam:  GEN: Elderly male in NAD. Pleasant , in W/C  CARDIAC: Mild bradycardia  LUNGS: CTA  ABDOMEN: Soft, NT  EXT:  3+ edema  NEURO:  Alert and interactive     LABS:   CMP  Recent Labs   Lab Test 02/22/19  1202 12/19/18  1254 11/21/18  1501 10/15/18  1117    139 135 134   POTASSIUM 5.5* 3.8 5.0 4.2   CHLORIDE 114* 110* 106 103   CO2 19* 21 19* 21   ANIONGAP 11 8 10 10   * 188* 518* 348*   BUN 54* 29 38* 56*   CR 3.25* 2.50* 2.57* 3.48*   GFRESTIMATED 18* 25* 25* 17*   GFRESTBLACK 21* 29* 30* 21*   NAZIA 7.8* 8.3* 8.4* 8.4*     Recent Labs   Lab Test 04/04/18  1338 12/20/17  1130 08/02/16  1305 08/02/16 12/11/15  1016 09/14/15  0719   BILITOTAL 0.4 0.5  --   --  0.4 0.9   ALKPHOS 186* 218*  --  141* 207* 168*   ALT 16 18  --  14 36 24   AST 24 24 18  --  29 30     CBC  Recent Labs   Lab Test 02/22/19  1202 12/19/18  1254 11/21/18  1501 10/15/18  1117   HGB 8.4* 10.7* 10.9* 11.5*   WBC 5.7 3.9* 4.1 4.3   RBC 2.55* 3.37* 3.52* 3.63*   HCT 26.1* 32.0* 34.0* 33.8*   * 95 97 93   MCH 32.9 31.8 31.0 31.7   MCHC 32.2 33.4 32.1 34.0   RDW 15.2* 14.7 13.7 12.7   * 213 202 210     URINE STUDIES  Recent Labs   Lab Test 09/12/18 12/11/15  1038 09/14/15  0958 03/02/15  1407 02/11/15  1045   COLOR Yellow Light Yellow Light Yellow Yellow Light Yellow   APPEARANCE Clear Clear Clear Clear Clear   URINEGLC 250  300* Negative Negative Negative   URINEBILI Neg Negative Negative Negative Negative   URINEKETONE Neg  Negative Negative Negative Negative   SG 1.025 1.007 1.010 1.012 1.010   UBLD Small Negative Trace* Small* Negative   URINEPH 5.5 5.5 6.0 6.0 6.0   PROTEIN 300  30* 300* 300* 100*   UROBILINOGEN 0.2  --   --   --   --    NITRITE Neg Negative Negative Negative Negative   LEUKEST Neg Negative Negative Negative Negative   RBCU  --  1 1 4* 3*   WBCU  --  <1 2 <1 1     Recent Labs   Lab Test 12/19/18  1423 11/21/18  1513 10/15/18  1130 09/25/18  1357   UTPG 5.68* 4.91* 2.13* 2.74*     PTH  Recent Labs   Lab Test 10/15/18  1117 09/25/18  1320 06/08/16  1146   PTHI 868* 559* 859*     IRON STUDIES  Recent Labs   Lab Test 02/22/19  1202 10/15/18  1117 09/25/18  1320   IRON 75 87 70   * 195* 195*   IRONSAT 37 44 36   HUMPHREY 445* 345 250       Tiny Duff, NP

## 2019-02-22 NOTE — PROGRESS NOTES
Chief Complaint   Patient presents with     RECHECK     CKD f/u     Blood pressure 170/76, pulse 53, SpO2 96 %.    Mariela Cannon, CMA

## 2019-02-23 NOTE — PROGRESS NOTES
Nephrology Clinic Visit 2/22/19    Assessment and Plan:       1. Medication compliance - I suspect hospital admission with K of 7.2 related to confusion regarding his medications. I have sent a message to his TCU asking that when patient is discharged home care is provided for nursing medication management/blood pressure checks, etc. Patient agreeable to home care today during our discussion.     - the other issue is how his medications are being dispensed at home. He did have pill packs coming from an [facility] pharmacy which caused delay and confusion with any medication changes. He had decided to switch to a local pharmacy at the time of our last visit. This will have to be clarified at discharge from TCU.    - PCP contact info: ( Dr Celso Esparza - [place] phone [phone], fax [phone])).       2. CKD4 w/proteinuria - Creat up to 3.2 today, eGFR 18 ml/mn. UPCR had jumped significantly last visit. Will recheck next visit. This rise was in the setting of very poorly controlled DM with A1c of 11.5% in November. No retention found during Urology evaluation in Sept 2018. No longer on vesicare or Mirabegron given delirium.   Etiology of CKD is bx proven DM/HTN related CKD   - Creat has been relatively stable since 2016, but now slowly rising in the setting of uncontrolled HTN and DM   - Will have RRT discussion next visit   - Does not use NSAIDs   - Blood pressure goal < 140/80   - A1c goal 7%      3. Volume status - Has significant edema, no dyspnea in setting of high dose sodium bicarb and Minoxidil.  Albumin 2.8. No weight today. Was 87.3 kg last visit. B/P 160-170/ today in clinic. On Lasix 40 mg qd   - Really needs to increase Lasix dose, but will hold off for now   - Renal diet ordered          4. HTN - Uncontrolled. Clinic blood pressures today; 160-170/. He has significant edema.    Current prescribed regimen:    Doxazosin 2 mg HS   Furosemide 40 mg qd   Lopressor 75 mg bid   Minoxidil 5 mg qd  - Add  Amlodipine 5 mg every day  - Increase Doxazosin to 4 mg HS   -Will need increase in Furosemide next visit    - Would like to discontinue Minoxidil and increase Doxazosin/Amlodipine/furosemide.   - Check blood pressures daily and record. Bring in to next visit     - Patient has brought in his Omron blood pressure machine and it is accurate, however, patient can not apply the cuff independently      5. Electrolytes - Borderline hyperkalemia. K 5.5.    - Have ordered renal diet      6. Acid base - Bicarb 19 despite Bicarb 1950 mg TID.    - Continue current dose      7. BMD - Corrected Ca 8.7, Phos 4.8, albumin 2.8   - Vit D 29 (10/18)   - PTH up to 868 10/18 from 559 (9/18)   - Continue Calcitriol 0.25 mcq every day      8. Anemia - Hgb 8.4    - Will re enroll in Anemia management following discharge from TCU   - On iron bid   -Iron studies 2/19: Ferritin 445, IS 75, Fe 37   - Will check on last colonoscopy     9. MGUS - Follows with [facility], Dr Nicholas Deal.      10. DM2 - Very uncontrolled. A1c 11.5% in Nov 2018, improved to 9.1% in January. Likely contributing to his increase proteinuria. On insulin      11. Disposition - RTC one month for f/u.       Assessment and plan was discussed with patient and he voiced his understanding and agreement.      Reason for Visit:  CKD4/HTN f/u      HPI:  Mr Walker is a 74 yo male with CKD4, HTN, DM2, MGUS, previous CVA, noncompliance in today for routine CKD f/u. Last seen by me on 12/19/18. We had tapered off Minoxidil by that visit and were adjusting his other antihypertensives. Unfortunately have not seen patient since that visit given recent hospital admissions. Baseline creat in the upper 2-3 range since 8/16. He is non proteinuric. Patent has significant problems with non compliance. Have been working with his PCP to try to get home care for nursing med management.       Interval Hx:   Hospital admission 1/24-2/1/19 for hyperkalemia ( K 7.2) , AMS  Current  recovering at [facility] [phone]        ROS:  Patient recovering from most recent hospital admission at [facility]. He is hoping to be discharged soon. He is agreeable to home care. Patient's medication list has been significantly changed since our last visit with resumption of Minoxidil and Furosemide and discontinuation of Losartan, Vesicare and Myrbetriq. Patient denies significant incontinence/urinary frequency.   He is on large doses of oral bicarb.   He has developed significant edema  Not following a renal diet at the TCU  Denies dyspnea, CP, abdominal pain. Appetite is OK       Chronic Health Problems:      CKD4/5  DM2  H/O CVA  HTN  MGUS  Anemia  PRINCE on CPAP  Severe Urinary frequency/incontinence  HLD      Personal Hx:   Single, lives alone in own home. Does not drive, NS, ETOH none, uses walker     Allergies:  Allergies   Allergen Reactions     Lisinopril      Other reaction(s): Renal Failure     Armodafinil Rash       Medications:  Current Outpatient Medications   Medication Sig     acetaminophen (TYLENOL) 500 MG tablet Take 500-1,000 mg by mouth every 6 hours as needed for mild pain     amphetamine-dextroamphetamine (ADDERALL) 20 MG tablet Take 20 mg by mouth daily     atorvastatin (LIPITOR) 20 MG tablet Take 20 mg by mouth every evening 1700     calcitRIOL (ROCALTROL) 0.25 MCG capsule Take 1 capsule (0.25 mcg) by mouth daily     cetirizine (ZYRTEC) 5 MG tablet Take 5 mg by mouth daily     doxazosin (CARDURA) 2 MG tablet Take 4 mg by mouth At Bedtime     ferrous sulfate (FEROSUL) 325 (65 Fe) MG tablet Take 1 tablet (325 mg) by mouth 2 times daily     fluticasone (FLONASE) 50 MCG/ACT nasal spray Spray 1 spray into both nostrils 2 times daily as needed      furosemide (LASIX) 40 MG tablet Take 40 mg by mouth daily     gabapentin (NEURONTIN) 300 MG capsule Take 600 mg by mouth 2 times daily     hydrocortisone (CORTAID) 1 % external cream Apply topically as needed     insulin aspart (NOVOLOG PEN)  100 UNIT/ML injection Inject subcutaneous 3 times daily with meals and bedtime Medium Scale less than 70 .................. Refer to Hypoglycemia Treatment Protocol 70 - 149 ......................... No corrective insulin 150 - 199 ....................... 2 units 200 - 249 ....................... 4 units 250 - 299 ....................... 6 units 300 - 349 ....................... 8 units 350 or greater ................ 10 units and notify MD       insulin detemir (LEVEMIR FLEXPEN/FLEXTOUCH) 100 UNIT/ML soln Inject 10 Units Subcutaneous 2 times daily Dose was originally 50 units BID but pt has been cutting back (Patient taking differently: Inject 30 Units Subcutaneous 2 times daily Dose was originally 50 units BID but pt has been cutting back)     lidocaine (LIDODERM) 5 % patch Place 1 patch onto the skin every 24 hours     metoprolol tartrate (LOPRESSOR) 25 MG tablet Take 3 tablets (75 mg) by mouth 2 times daily     minoxidil (LONITEN) 2.5 MG tablet Take 5 mg by mouth daily     Multiple Vitamin (MULTI-VITAMINS) TABS Take 1 tablet by mouth daily      omeprazole (PRILOSEC) 20 MG DR capsule Take 1 capsule (20 mg) by mouth daily     sodium bicarbonate 650 MG tablet Take 3 tablets (1,950 mg) by mouth 3 times daily     traZODone (DESYREL) 50 MG tablet Take 25 mg by mouth At Bedtime     ASPIRIN EC PO Take 81 mg by mouth daily      blood glucose monitoring (NOVA SUREFLEX) lancets 3x/day with blood glucose testing     darbepoetin aaliyah-polysorbate (ARANESP, ALBUMIN FREE,) 300 MCG/ML SOLN Inject 300 mcg Subcutaneous every 14 days As needed for hgb <=11g/dL     insulin pen needle (B-D U/F) 31G X 8 MM USE AS DIRECTED     loratadine (CLARITIN) 10 MG tablet Take 10 mg by mouth daily     Misc Natural Products (OSTEO BI-FLEX ADV TRIPLE ST PO) Take by mouth daily     order for DME 4 wheeled walker with brakes and a seat.     UNABLE TO FIND Take by mouth daily MEDICATION NAME: Brain Peak     No current facility-administered medications  for this visit.       Vitals:  /74   Pulse 53   SpO2 96%     Exam:  GEN: Elderly male in NAD. Pleasant, in W/C  CARDIAC: Mild bradycardia  LUNGS: CTA  ABDOMEN: Soft, NT  EXT: 3+ edema  NEURO: Alert and interactive     LABS:   CMP  Recent Labs   Lab Test 02/22/19  1202 12/19/18  1254 11/21/18  1501 10/15/18  1117    139 135 134   POTASSIUM 5.5* 3.8 5.0 4.2   CHLORIDE 114* 110* 106 103   CO2 19* 21 19* 21   ANIONGAP 11 8 10 10   * 188* 518* 348*   BUN 54* 29 38* 56*   CR 3.25* 2.50* 2.57* 3.48*   GFRESTIMATED 18* 25* 25* 17*   GFRESTBLACK 21* 29* 30* 21*   NAZIA 7.8* 8.3* 8.4* 8.4*     Recent Labs   Lab Test 04/04/18  1338 12/20/17  1130 08/02/16  1305 08/02/16 12/11/15  1016 09/14/15  0719   BILITOTAL 0.4 0.5  --   --  0.4 0.9   ALKPHOS 186* 218*  --  141* 207* 168*   ALT 16 18  --  14 36 24   AST 24 24 18  --  29 30     CBC  Recent Labs   Lab Test 02/22/19  1202 12/19/18  1254 11/21/18  1501 10/15/18  1117   HGB 8.4* 10.7* 10.9* 11.5*   WBC 5.7 3.9* 4.1 4.3   RBC 2.55* 3.37* 3.52* 3.63*   HCT 26.1* 32.0* 34.0* 33.8*   * 95 97 93   MCH 32.9 31.8 31.0 31.7   MCHC 32.2 33.4 32.1 34.0   RDW 15.2* 14.7 13.7 12.7   * 213 202 210     URINE STUDIES  Recent Labs   Lab Test 09/12/18 12/11/15  1038 09/14/15  0958 03/02/15  1407 02/11/15  1045   COLOR Yellow Light Yellow Light Yellow Yellow Light Yellow   APPEARANCE Clear Clear Clear Clear Clear   URINEGLC 250  300* Negative Negative Negative   URINEBILI Neg Negative Negative Negative Negative   URINEKETONE Neg Negative Negative Negative Negative   SG 1.025 1.007 1.010 1.012 1.010   UBLD Small Negative Trace* Small* Negative   URINEPH 5.5 5.5 6.0 6.0 6.0   PROTEIN 300  30* 300* 300* 100*   UROBILINOGEN 0.2  --   --   --   --    NITRITE Neg Negative Negative Negative Negative   LEUKEST Neg Negative Negative Negative Negative   RBCU  --  1 1 4* 3*   WBCU  --  <1 2 <1 1     Recent Labs   Lab Test 12/19/18  1423 11/21/18  1513 10/15/18  1130  09/25/18  1357   UTPG 5.68* 4.91* 2.13* 2.74*     PTH  Recent Labs   Lab Test 10/15/18  1117 09/25/18  1320 06/08/16  1146   PTHI 868* 559* 859*     IRON STUDIES  Recent Labs   Lab Test 02/22/19  1202 10/15/18  1117 09/25/18  1320   IRON 75 87 70   * 195* 195*   IRONSAT 37 44 36   HUMPHREY 445* 345 250       Tiny Duff, NP

## 2019-02-26 ENCOUNTER — RECORDS - HEALTHEAST (OUTPATIENT)
Dept: LAB | Facility: CLINIC | Age: 74
End: 2019-02-26

## 2019-02-27 LAB
ANION GAP SERPL CALCULATED.3IONS-SCNC: 7 MMOL/L (ref 5–18)
BUN SERPL-MCNC: 63 MG/DL (ref 8–28)
CALCIUM SERPL-MCNC: 8.8 MG/DL (ref 8.5–10.5)
CHLORIDE BLD-SCNC: 113 MMOL/L (ref 98–107)
CO2 SERPL-SCNC: 19 MMOL/L (ref 22–31)
CREAT SERPL-MCNC: 4.01 MG/DL (ref 0.7–1.3)
GFR SERPL CREATININE-BSD FRML MDRD: 15 ML/MIN/1.73M2
GLUCOSE BLD-MCNC: 205 MG/DL (ref 70–125)
POTASSIUM BLD-SCNC: 5.5 MMOL/L (ref 3.5–5)
SODIUM SERPL-SCNC: 139 MMOL/L (ref 136–145)

## 2019-02-28 ENCOUNTER — RECORDS - HEALTHEAST (OUTPATIENT)
Dept: LAB | Facility: CLINIC | Age: 74
End: 2019-02-28

## 2019-02-28 LAB — POTASSIUM BLD-SCNC: 5.4 MMOL/L (ref 3.5–5)

## 2019-03-04 ENCOUNTER — RECORDS - HEALTHEAST (OUTPATIENT)
Dept: LAB | Facility: CLINIC | Age: 74
End: 2019-03-04

## 2019-03-05 LAB
ANION GAP SERPL CALCULATED.3IONS-SCNC: 5 MMOL/L (ref 5–18)
BUN SERPL-MCNC: 59 MG/DL (ref 8–28)
CALCIUM SERPL-MCNC: 8.6 MG/DL (ref 8.5–10.5)
CHLORIDE BLD-SCNC: 114 MMOL/L (ref 98–107)
CO2 SERPL-SCNC: 21 MMOL/L (ref 22–31)
CREAT SERPL-MCNC: 3.64 MG/DL (ref 0.7–1.3)
GFR SERPL CREATININE-BSD FRML MDRD: 17 ML/MIN/1.73M2
GLUCOSE BLD-MCNC: 60 MG/DL (ref 70–125)
POTASSIUM BLD-SCNC: 5.5 MMOL/L (ref 3.5–5)
SODIUM SERPL-SCNC: 140 MMOL/L (ref 136–145)

## 2019-03-07 ENCOUNTER — RECORDS - HEALTHEAST (OUTPATIENT)
Dept: LAB | Facility: CLINIC | Age: 74
End: 2019-03-07

## 2019-03-08 LAB
ANION GAP SERPL CALCULATED.3IONS-SCNC: 7 MMOL/L (ref 5–18)
BUN SERPL-MCNC: 65 MG/DL (ref 8–28)
CALCIUM SERPL-MCNC: 8.4 MG/DL (ref 8.5–10.5)
CHLORIDE BLD-SCNC: 112 MMOL/L (ref 98–107)
CO2 SERPL-SCNC: 19 MMOL/L (ref 22–31)
CREAT SERPL-MCNC: 3.72 MG/DL (ref 0.7–1.3)
GFR SERPL CREATININE-BSD FRML MDRD: 16 ML/MIN/1.73M2
GLUCOSE BLD-MCNC: 76 MG/DL (ref 70–125)
POTASSIUM BLD-SCNC: 5.6 MMOL/L (ref 3.5–5)
SODIUM SERPL-SCNC: 138 MMOL/L (ref 136–145)

## 2019-03-11 ENCOUNTER — RECORDS - HEALTHEAST (OUTPATIENT)
Dept: LAB | Facility: CLINIC | Age: 74
End: 2019-03-11

## 2019-03-12 ENCOUNTER — TELEPHONE (OUTPATIENT)
Dept: NEPHROLOGY | Facility: CLINIC | Age: 74
End: 2019-03-12

## 2019-03-12 LAB
ANION GAP SERPL CALCULATED.3IONS-SCNC: 8 MMOL/L (ref 5–18)
BUN SERPL-MCNC: 66 MG/DL (ref 8–28)
CALCIUM SERPL-MCNC: 8.9 MG/DL (ref 8.5–10.5)
CHLORIDE BLD-SCNC: 114 MMOL/L (ref 98–107)
CO2 SERPL-SCNC: 20 MMOL/L (ref 22–31)
CREAT SERPL-MCNC: 4.04 MG/DL (ref 0.7–1.3)
GFR SERPL CREATININE-BSD FRML MDRD: 15 ML/MIN/1.73M2
GLUCOSE BLD-MCNC: 108 MG/DL (ref 70–125)
POTASSIUM BLD-SCNC: 5.5 MMOL/L (ref 3.5–5)
SODIUM SERPL-SCNC: 142 MMOL/L (ref 136–145)

## 2019-03-12 NOTE — TELEPHONE ENCOUNTER
Spoke with [facility]. They wanted to update the provider here about the creatinine increase. It was 4.04 today, the last check on 2/27 was 4.01. His weight is also up a few pounds, but his edema is improving. He is now on bumex 1mg BID. No symptoms reported. They will recheck the lab again on Friday, just wanted provider to be aware. An update was sent to Gabrielle Kent RN

## 2019-03-12 NOTE — TELEPHONE ENCOUNTER
[facility] Call Center    Phone Message    May a detailed message be left on voicemail: yes    Reason for Call: Other: [facility] Update on pateint Creatinine: 4.04 Weight upfrom 242 to 245.8. Call with questions or orders.     Action Taken: Message routed to:  [facility] ([facility]): Neph

## 2019-03-14 ENCOUNTER — RECORDS - HEALTHEAST (OUTPATIENT)
Dept: LAB | Facility: CLINIC | Age: 74
End: 2019-03-14

## 2019-03-15 LAB
ANION GAP SERPL CALCULATED.3IONS-SCNC: 9 MMOL/L (ref 5–18)
BUN SERPL-MCNC: 65 MG/DL (ref 8–28)
CALCIUM SERPL-MCNC: 9.1 MG/DL (ref 8.5–10.5)
CHLORIDE BLD-SCNC: 114 MMOL/L (ref 98–107)
CO2 SERPL-SCNC: 20 MMOL/L (ref 22–31)
CREAT SERPL-MCNC: 3.97 MG/DL (ref 0.7–1.3)
GFR SERPL CREATININE-BSD FRML MDRD: 15 ML/MIN/1.73M2
GLUCOSE BLD-MCNC: 52 MG/DL (ref 70–125)
POTASSIUM BLD-SCNC: 5.7 MMOL/L (ref 3.5–5)
SODIUM SERPL-SCNC: 143 MMOL/L (ref 136–145)

## 2019-03-18 DIAGNOSIS — N18.4 CKD (CHRONIC KIDNEY DISEASE) STAGE 4, GFR 15-29 ML/MIN (H): Primary | ICD-10-CM

## 2019-04-10 ENCOUNTER — OFFICE VISIT (OUTPATIENT)
Dept: NEPHROLOGY | Facility: CLINIC | Age: 74
End: 2019-04-10
Payer: MEDICARE

## 2019-04-10 VITALS — SYSTOLIC BLOOD PRESSURE: 128 MMHG | DIASTOLIC BLOOD PRESSURE: 56 MMHG | OXYGEN SATURATION: 99 % | HEART RATE: 56 BPM

## 2019-04-10 DIAGNOSIS — E87.20 METABOLIC ACIDOSIS: ICD-10-CM

## 2019-04-10 DIAGNOSIS — N18.5 CKD (CHRONIC KIDNEY DISEASE) STAGE 5, GFR LESS THAN 15 ML/MIN (H): Primary | ICD-10-CM

## 2019-04-10 DIAGNOSIS — N18.4 CKD (CHRONIC KIDNEY DISEASE) STAGE 4, GFR 15-29 ML/MIN (H): ICD-10-CM

## 2019-04-10 DIAGNOSIS — D63.1 ANEMIA OF CHRONIC RENAL FAILURE, STAGE 5 (H): ICD-10-CM

## 2019-04-10 DIAGNOSIS — N25.81 SECONDARY RENAL HYPERPARATHYROIDISM (H): ICD-10-CM

## 2019-04-10 DIAGNOSIS — K21.9 GASTROESOPHAGEAL REFLUX DISEASE WITHOUT ESOPHAGITIS: ICD-10-CM

## 2019-04-10 DIAGNOSIS — N18.5 ANEMIA OF CHRONIC RENAL FAILURE, STAGE 5 (H): ICD-10-CM

## 2019-04-10 DIAGNOSIS — I10 ESSENTIAL HYPERTENSION: ICD-10-CM

## 2019-04-10 LAB
ALBUMIN SERPL-MCNC: 3.1 G/DL (ref 3.4–5)
ANION GAP SERPL CALCULATED.3IONS-SCNC: 6 MMOL/L (ref 3–14)
BUN SERPL-MCNC: 44 MG/DL (ref 7–30)
CALCIUM SERPL-MCNC: 8.9 MG/DL (ref 8.5–10.1)
CHLORIDE SERPL-SCNC: 114 MMOL/L (ref 94–109)
CO2 SERPL-SCNC: 20 MMOL/L (ref 20–32)
CREAT SERPL-MCNC: 3.85 MG/DL (ref 0.66–1.25)
DEPRECATED CALCIDIOL+CALCIFEROL SERPL-MC: 22 UG/L (ref 20–75)
ERYTHROCYTE [DISTWIDTH] IN BLOOD BY AUTOMATED COUNT: 14.6 % (ref 10–15)
GFR SERPL CREATININE-BSD FRML MDRD: 15 ML/MIN/{1.73_M2}
GLUCOSE SERPL-MCNC: 104 MG/DL (ref 70–99)
HCT VFR BLD AUTO: 22.9 % (ref 40–53)
HGB BLD-MCNC: 7.3 G/DL (ref 13.3–17.7)
MCH RBC QN AUTO: 31.7 PG (ref 26.5–33)
MCHC RBC AUTO-ENTMCNC: 31.9 G/DL (ref 31.5–36.5)
MCV RBC AUTO: 100 FL (ref 78–100)
PHOSPHATE SERPL-MCNC: 4.4 MG/DL (ref 2.5–4.5)
PLATELET # BLD AUTO: 180 10E9/L (ref 150–450)
POTASSIUM SERPL-SCNC: 5.2 MMOL/L (ref 3.4–5.3)
PTH-INTACT SERPL-MCNC: 332 PG/ML (ref 18–80)
RBC # BLD AUTO: 2.3 10E12/L (ref 4.4–5.9)
SODIUM SERPL-SCNC: 140 MMOL/L (ref 133–144)
WBC # BLD AUTO: 4.2 10E9/L (ref 4–11)

## 2019-04-10 PROCEDURE — 82306 VITAMIN D 25 HYDROXY: CPT

## 2019-04-10 PROCEDURE — 83970 ASSAY OF PARATHORMONE: CPT

## 2019-04-10 PROCEDURE — G0463 HOSPITAL OUTPT CLINIC VISIT: HCPCS

## 2019-04-10 PROCEDURE — 82728 ASSAY OF FERRITIN: CPT

## 2019-04-10 PROCEDURE — 83540 ASSAY OF IRON: CPT

## 2019-04-10 PROCEDURE — 80069 RENAL FUNCTION PANEL: CPT

## 2019-04-10 PROCEDURE — 85027 COMPLETE CBC AUTOMATED: CPT

## 2019-04-10 PROCEDURE — 36415 COLL VENOUS BLD VENIPUNCTURE: CPT

## 2019-04-10 PROCEDURE — 83550 IRON BINDING TEST: CPT

## 2019-04-10 NOTE — NURSING NOTE
Chief Complaint   Patient presents with     RECHECK     patient here to see NP     Blood pressure 128/56, pulse 56, SpO2 99 %.    Patient has no list of medications with him per Nursing home , patient is also wheelchair bound, no weight.   Mariela Cannon, CMA

## 2019-04-10 NOTE — LETTER
4/10/2019       RE: Austin Walker  [address] [facility] [address]     Dear Colleague,    Thank you for referring your patient, Austin Walker, to the [facility] NEPHROLOGY at [facility]. Please see a copy of my visit note below.    Nephrology Clinic Visit 4/10/19    Assessment and Plan:       1. Medication compliance - Patient with long history of non compliance resulting in very poor diet, poorly controlled DM and poorly controlled HTN. Since being at [facility] and now living at [facility] his glycemic control has improved, his blood pressure well controlled, edema has improved and albumin has risen.    - PCP contact info: ( Dr Celso Esparza - [place] phone [phone], fax [phone])).       2. CKD5 w/proteinuria - Renal function continues to worsen due to poorly controlled DM/HTN. Creat up to 3.8 today, eGFR 15 ml/mn. He has poor muscle mass so his renal function is likely worse than creat suggests   - UPCR 5.6 g/gCr 12/18 in the setting of very poorly controlled DM with A1c of 11.5% in November 2018   - Etiology of CKD is bx proven DM/HTN related CKD   - Discussed with patient today the progression of his renal failure. He voiced surprise about even having kidney disease. Patient shows little insight into his health conditions or our many previous conversations. He requested renal transplant. We discussed that he would not be a transplant candidate given his multiple co morbidities. We discussed hemodialysis vs medical management. Brother Jareth is on HD and provided personal experience. I asked patient to think about how he would like to proceed and we would discuss more next visit.    - Does not use NSAIDs   - Blood pressure now at goal < 140/80   - Glycemic control is improving      3. Volume status - Has edema, but improved from last visit. Denies dyspnea. Albumin 3.1. No weight today. B/P 120-130. On Bumex 1 mg bid   -  Continue Bumex at current dose   - Not currently  on renal or sodium controlled diet at [facility]        4. HTN - Controlled. Clinic blood pressures today; 128-131/56-64/. He has improved edema.    Current prescribed regimen:    Doxazosin 4 mg HS   Bumex 1 mg bid   Lopressor 75 mg bid   Minoxidil 5 mg every day   Amlodipine 5 mg every day       5. Electrolytes - Borderline hyperkalemia. K 5.2.    - Not following a renal diet. Will order if his K becomes more of a problem.       6. Acid base -  No acute concerns. Bicarb 20.   - Continue 650 mg TID.       7. BMD - Corrected Ca  9.3, Phos 4.5, albumin 3.1   - Vit D 22 ( 4/19)   -  (much improved!) ( 4/19)    - Continue Calcitriol 0.25 mcq every day      8. Anemia - Hgb 7.3    - Not currently receiving DANILO.    - On iron bid   -Iron studies 2/19: Ferritin 445, IS 75, Fe 37   - Recheck iron studies. Restart DANILO after d/w Oncologist.      9. MGUS - Follows with [facility], Dr Nicholas Deal.   -Will contact him regarding his thoughts on us using DANILO.       10. DM2 - Improved control, but historically very poorly controlled due to non compliance with meals/insulin. Random BS today was 104!    11. Cognitive impairment? - Unclear if patient is cognitively impaired or just in denial. He continues to be his own legal decision maker.    - Recommend cognitive assessment at [facility] given that we are discussing dialysis/goals of care.     12. Disposition - RTC one month for f/u.       Reason for Visit:  CKD5/HTN f/u      HPI:  Mr Walker is a 72 yo male with CKD5, HTN, DM2, MGUS, previous CVA, chronic noncompliance in today for routine CKD f/u. Last seen by me on 2/2/19. Baseline creat had been in the upper 2-3 range since 8/16, but now moving into the upper 3 range with progression of his disease.        Interval Hx:   Moved to [facility]      ROS:  Patient was moved from TCU following most recent hospital admission to AL facility by brother. Patient not happy about not being at home, but his blood pressure and blood sugars look  significantly better since his medications are being managed by the [facility]. I reinforced the importance of patient remaining at the [facility] for his overall health.   I was able to update patient's med list with [facility] medication list  Patient demonstrates very little insight into his health problems. Insists he has never been told he had kidney disease. Wonders if he could have a transplant. Brother Jareth is on HD.   Denies dyspnea, CP, abdominal pain. Appetite is OK       Chronic Health Problems:      CKD5  DM2  H/O CVA  HTN  MGUS  Anemia  PRINCE on CPAP  Severe Urinary frequency/incontinence  HLD      Personal Hx:   Single, Wife  several years ago. Currently living at [facility] (ph [phone]). Does not drive, NS, ETOH none, uses walker     Allergies:  Allergies   Allergen Reactions     Lisinopril      Other reaction(s): Renal Failure     Armodafinil Rash       Medications:  Current Outpatient Medications   Medication Sig     amLODIPine (NORVASC) 5 MG tablet Take 1 tablet (5 mg) by mouth daily     bumetanide (BUMEX) 1 MG tablet Take 1 tablet (1 mg) by mouth 2 times daily     esomeprazole (NEXIUM) 20 MG DR capsule Take 1 capsule (20 mg) by mouth every morning (before breakfast) Take 30-60 minutes before eating.     sodium bicarbonate 650 MG tablet Take 1 tablet (650 mg) by mouth 3 times daily     acetaminophen (TYLENOL) 500 MG tablet Take 500-1,000 mg by mouth every 6 hours as needed for mild pain     amphetamine-dextroamphetamine (ADDERALL) 20 MG tablet Take 20 mg by mouth daily     atorvastatin (LIPITOR) 20 MG tablet Take 20 mg by mouth every evening 1700     blood glucose monitoring (NOVA SUREFLEX) lancets 3x/day with blood glucose testing     calcitRIOL (ROCALTROL) 0.25 MCG capsule Take 1 capsule (0.25 mcg) by mouth daily     cetirizine (ZYRTEC) 5 MG tablet Take 5 mg by mouth daily     doxazosin (CARDURA) 2 MG tablet Take 4 mg by mouth At Bedtime     ferrous sulfate (FEROSUL) 325 (65 Fe)  MG tablet Take 1 tablet (325 mg) by mouth 2 times daily     fluticasone (FLONASE) 50 MCG/ACT nasal spray Spray 1 spray into both nostrils 2 times daily as needed      gabapentin (NEURONTIN) 300 MG capsule Take 600 mg by mouth At Bedtime     hydrocortisone (CORTAID) 1 % external cream Apply topically as needed     insulin aspart (NOVOLOG PEN) 100 UNIT/ML injection Inject subcutaneous 3 times daily with meals and bedtime Medium Scale less than 70 .................. Refer to Hypoglycemia Treatment Protocol 70 - 149 ......................... No corrective insulin 150 - 199 ....................... 2 units 200 - 249 ....................... 4 units 250 - 299 ....................... 6 units 300 - 349 ....................... 8 units 350 or greater ................ 10 units and notify MD       insulin detemir (LEVEMIR FLEXPEN/FLEXTOUCH) 100 UNIT/ML soln Inject 10 Units Subcutaneous 2 times daily Dose was originally 50 units BID but pt has been cutting back (Patient taking differently: Inject 30 Units Subcutaneous 2 times daily Dose was originally 50 units BID but pt has been cutting back)     insulin pen needle (B-D U/F) 31G X 8 MM USE AS DIRECTED     lidocaine (LIDODERM) 5 % patch Place 1 patch onto the skin every 24 hours     metoprolol tartrate (LOPRESSOR) 25 MG tablet Take 3 tablets (75 mg) by mouth 2 times daily     minoxidil (LONITEN) 2.5 MG tablet Take 5 mg by mouth daily     Multiple Vitamin (MULTI-VITAMINS) TABS Take 1 tablet by mouth daily      order for DME 4 wheeled walker with brakes and a seat.     traZODone (DESYREL) 50 MG tablet Take 25 mg by mouth At Bedtime     No current facility-administered medications for this visit.       Vitals:  /56   Pulse 56   SpO2 99%     Exam:  GEN: Elderly male in NAD. Pleasant, in W/C. Brother Jareth present  CARDIAC: Mild bradycardia  LUNGS: CTA  ABDOMEN: Soft, NT  EXT: 3+ edema  NEURO: Alert and interactive, but lacks insight into his health problems     LABS:    CMP  Recent Labs   Lab Test 04/10/19  1203 02/22/19  1202 12/19/18  1254 11/21/18  1501    143 139 135   POTASSIUM 5.2 5.5* 3.8 5.0   CHLORIDE 114* 114* 110* 106   CO2 20 19* 21 19*   ANIONGAP 6 11 8 10   * 193* 188* 518*   BUN 44* 54* 29 38*   CR 3.85* 3.25* 2.50* 2.57*   GFRESTIMATED 15* 18* 25* 25*   GFRESTBLACK 17* 21* 29* 30*   NAZIA 8.9 7.8* 8.3* 8.4*     Recent Labs   Lab Test 04/04/18  1338 12/20/17  1130 08/02/16  1305 08/02/16 12/11/15  1016 09/14/15  0719   BILITOTAL 0.4 0.5  --   --  0.4 0.9   ALKPHOS 186* 218*  --  141* 207* 168*   ALT 16 18  --  14 36 24   AST 24 24 18  --  29 30     CBC  Recent Labs   Lab Test 04/10/19  1203 02/22/19  1202 12/19/18  1254 11/21/18  1501   HGB 7.3* 8.4* 10.7* 10.9*   WBC 4.2 5.7 3.9* 4.1   RBC 2.30* 2.55* 3.37* 3.52*   HCT 22.9* 26.1* 32.0* 34.0*    102* 95 97   MCH 31.7 32.9 31.8 31.0   MCHC 31.9 32.2 33.4 32.1   RDW 14.6 15.2* 14.7 13.7    126* 213 202     URINE STUDIES  Recent Labs   Lab Test 09/12/18 12/11/15  1038 09/14/15  0958 03/02/15  1407 02/11/15  1045   COLOR Yellow Light Yellow Light Yellow Yellow Light Yellow   APPEARANCE Clear Clear Clear Clear Clear   URINEGLC 250  300* Negative Negative Negative   URINEBILI Neg Negative Negative Negative Negative   URINEKETONE Neg Negative Negative Negative Negative   SG 1.025 1.007 1.010 1.012 1.010   UBLD Small Negative Trace* Small* Negative   URINEPH 5.5 5.5 6.0 6.0 6.0   PROTEIN 300  30* 300* 300* 100*   UROBILINOGEN 0.2  --   --   --   --    NITRITE Neg Negative Negative Negative Negative   LEUKEST Neg Negative Negative Negative Negative   RBCU  --  1 1 4* 3*   WBCU  --  <1 2 <1 1     Recent Labs   Lab Test 12/19/18  1423 11/21/18  1513 10/15/18  1130 09/25/18  1357   UTPG 5.68* 4.91* 2.13* 2.74*     PTH  Recent Labs   Lab Test 04/10/19  1203 10/15/18  1117 09/25/18  1320   PTHI 332* 868* 559*     IRON STUDIES  Recent Labs   Lab Test 02/22/19  1202 10/15/18  1117 09/25/18  1320   IRON 75  87 70   * 195* 195*   IRONSAT 37 44 36   HUMPHREY 445* 345 250       Tiny Duff, NP

## 2019-04-11 ENCOUNTER — CARE COORDINATION (OUTPATIENT)
Dept: NEPHROLOGY | Facility: CLINIC | Age: 74
End: 2019-04-11

## 2019-04-11 LAB
FERRITIN SERPL-MCNC: 535 NG/ML (ref 26–388)
IRON SATN MFR SERPL: 36 % (ref 15–46)
IRON SERPL-MCNC: 69 UG/DL (ref 35–180)
TIBC SERPL-MCNC: 192 UG/DL (ref 240–430)

## 2019-04-11 RX ORDER — AMLODIPINE BESYLATE 5 MG/1
5 TABLET ORAL DAILY
Qty: 90 TABLET | Refills: 3
Start: 2019-04-11 | End: 2020-04-10

## 2019-04-11 RX ORDER — SODIUM BICARBONATE 650 MG/1
650 TABLET ORAL 3 TIMES DAILY
Qty: 120 TABLET | Refills: 3 | Status: SHIPPED | OUTPATIENT
Start: 2019-04-11 | End: 2019-05-08

## 2019-04-11 RX ORDER — BUMETANIDE 1 MG/1
1 TABLET ORAL 2 TIMES DAILY
Qty: 180 TABLET | Refills: 1
Start: 2019-04-11

## 2019-04-11 NOTE — PROGRESS NOTES
Nephrology Clinic Visit 4/10/19    Assessment and Plan:       1. Medication compliance - Patient with long history of non compliance resulting in very poor diet, poorly controlled DM and poorly controlled HTN. Since being at [facility] and now living at [facility] his glycemic control has improved, his blood pressure well controlled, edema has improved and albumin has risen.    - PCP contact info: ( Dr Celso Esparza - [place] phone [phone], fax [phone])).       2. CKD5 w/proteinuria - Renal function continues to worsen due to poorly controlled DM/HTN. Creat up to 3.8 today, eGFR 15 ml/mn. He has poor muscle mass so his renal function is likely worse than creat suggests   - UPCR 5.6 g/gCr 12/18 in the setting of very poorly controlled DM with A1c of 11.5% in November 2018   - Etiology of CKD is bx proven DM/HTN related CKD   - Discussed with patient today the progression of his renal failure. He voiced surprise about even having kidney disease. Patient shows little insight into his health conditions or our many previous conversations. He requested renal transplant. We discussed that he would not be a transplant candidate given his multiple co morbidities. We discussed hemodialysis vs medical management. Brother Jareth is on HD and provided personal experience. I asked patient to think about how he would like to proceed and we would discuss more next visit.    - Does not use NSAIDs   - Blood pressure now at goal < 140/80   - Glycemic control is improving      3. Volume status - Has edema, but improved from last visit. Denies dyspnea. Albumin 3.1. No weight today. B/P 120-130. On Bumex 1 mg bid   - Continue Bumex at current dose   - Not currently on renal or sodium controlled diet at [facility]        4. HTN - Controlled. Clinic blood pressures today; 128-131/56-64/. He has improved edema.    Current prescribed regimen:    Doxazosin 4 mg HS   Bumex 1 mg bid   Lopressor 75 mg bid   Minoxidil 5 mg every day   Amlodipine 5 mg every  day       5. Electrolytes - Borderline hyperkalemia. K 5.2.    - Not following a renal diet. Will order if his K becomes more of a problem.       6. Acid base - No acute concerns. Bicarb 20.   - Continue 650 mg TID.       7. BMD - Corrected Ca 9.3, Phos 4.5, albumin 3.1   - Vit D 22 (4/19)   -  (much improved!) ( 4/19)    - Continue Calcitriol 0.25 mcq every day      8. Anemia - Hgb 7.3    - Not currently receiving DANILO.    - On iron bid   -Iron studies 2/19: Ferritin 445, IS 75, Fe 37   - Recheck iron studies. Restart DANILO after d/w Oncologist.      9. MGUS - Follows with [facility], Dr Nicholas Deal.   -Will contact him regarding his thoughts on us using DANILO.       10. DM2 - Improved control, but historically very poorly controlled due to non compliance with meals/insulin. Random BS today was 104!    11. Cognitive impairment? - Unclear if patient is cognitively impaired or just in denial. He continues to be his own legal decision maker.    - Recommend cognitive assessment at [facility] given that we are discussing dialysis/goals of care.     12. Disposition - RTC one month for f/u.       Reason for Visit:  CKD5/HTN f/u      HPI:  Mr Walker is a 72 yo male with CKD5, HTN, DM2, MGUS, previous CVA, chronic noncompliance in today for routine CKD f/u. Last seen by me on 2/2/19. Baseline creat had been in the upper 2-3 range since 8/16, but now moving into the upper 3 range with progression of his disease.        Interval Hx:   Moved to [facility]      ROS:  Patient was moved from TCU following most recent hospital admission to AL facility by brother. Patient not happy about not being at home, but his blood pressure and blood sugars look significantly better since his medications are being managed by the [facility]. I reinforced the importance of patient remaining at the [facility] for his overall health.   I was able to update patient's med list with [facility] medication list  Patient demonstrates very little insight into his health  problems. Insists he has never been told he had kidney disease. Wonders if he could have a transplant. Brother Jareth is on HD.   Denies dyspnea, CP, abdominal pain. Appetite is OK       Chronic Health Problems:      CKD5  DM2  H/O CVA  HTN  MGUS  Anemia  PRINCE on CPAP  Severe Urinary frequency/incontinence  HLD      Personal Hx:   Single, Wife  several years ago. Currently living at [facility] (ph [phone]). Does not drive, NS, ETOH none, uses walker     Allergies:  Allergies   Allergen Reactions     Lisinopril      Other reaction(s): Renal Failure     Armodafinil Rash       Medications:  Current Outpatient Medications   Medication Sig     amLODIPine (NORVASC) 5 MG tablet Take 1 tablet (5 mg) by mouth daily     bumetanide (BUMEX) 1 MG tablet Take 1 tablet (1 mg) by mouth 2 times daily     esomeprazole (NEXIUM) 20 MG DR capsule Take 1 capsule (20 mg) by mouth every morning (before breakfast) Take 30-60 minutes before eating.     sodium bicarbonate 650 MG tablet Take 1 tablet (650 mg) by mouth 3 times daily     acetaminophen (TYLENOL) 500 MG tablet Take 500-1,000 mg by mouth every 6 hours as needed for mild pain     amphetamine-dextroamphetamine (ADDERALL) 20 MG tablet Take 20 mg by mouth daily     atorvastatin (LIPITOR) 20 MG tablet Take 20 mg by mouth every evening 1700     blood glucose monitoring (NOVA SUREFLEX) lancets 3x/day with blood glucose testing     calcitRIOL (ROCALTROL) 0.25 MCG capsule Take 1 capsule (0.25 mcg) by mouth daily     cetirizine (ZYRTEC) 5 MG tablet Take 5 mg by mouth daily     doxazosin (CARDURA) 2 MG tablet Take 4 mg by mouth At Bedtime     ferrous sulfate (FEROSUL) 325 (65 Fe) MG tablet Take 1 tablet (325 mg) by mouth 2 times daily     fluticasone (FLONASE) 50 MCG/ACT nasal spray Spray 1 spray into both nostrils 2 times daily as needed      gabapentin (NEURONTIN) 300 MG capsule Take 600 mg by mouth At Bedtime     hydrocortisone (CORTAID) 1 % external  cream Apply topically as needed     insulin aspart (NOVOLOG PEN) 100 UNIT/ML injection Inject subcutaneous 3 times daily with meals and bedtime Medium Scale less than 70 .................. Refer to Hypoglycemia Treatment Protocol 70 - 149 ......................... No corrective insulin 150 - 199 ....................... 2 units 200 - 249 ....................... 4 units 250 - 299 ....................... 6 units 300 - 349 ....................... 8 units 350 or greater ................ 10 units and notify MD       insulin detemir (LEVEMIR FLEXPEN/FLEXTOUCH) 100 UNIT/ML soln Inject 10 Units Subcutaneous 2 times daily Dose was originally 50 units BID but pt has been cutting back (Patient taking differently: Inject 30 Units Subcutaneous 2 times daily Dose was originally 50 units BID but pt has been cutting back)     insulin pen needle (B-D U/F) 31G X 8 MM USE AS DIRECTED     lidocaine (LIDODERM) 5 % patch Place 1 patch onto the skin every 24 hours     metoprolol tartrate (LOPRESSOR) 25 MG tablet Take 3 tablets (75 mg) by mouth 2 times daily     minoxidil (LONITEN) 2.5 MG tablet Take 5 mg by mouth daily     Multiple Vitamin (MULTI-VITAMINS) TABS Take 1 tablet by mouth daily      order for DME 4 wheeled walker with brakes and a seat.     traZODone (DESYREL) 50 MG tablet Take 25 mg by mouth At Bedtime     No current facility-administered medications for this visit.       Vitals:  /56   Pulse 56   SpO2 99%     Exam:  GEN: Elderly male in NAD. Pleasant, in W/C. Brother Jareth present  CARDIAC: Mild bradycardia  LUNGS: CTA  ABDOMEN: Soft, NT  EXT: 3+ edema  NEURO: Alert and interactive, but lacks insight into his health problems     LABS:   CMP  Recent Labs   Lab Test 04/10/19  1203 02/22/19  1202 12/19/18  1254 11/21/18  1501    143 139 135   POTASSIUM 5.2 5.5* 3.8 5.0   CHLORIDE 114* 114* 110* 106   CO2 20 19* 21 19*   ANIONGAP 6 11 8 10   * 193* 188* 518*   BUN 44* 54* 29 38*   CR 3.85* 3.25* 2.50* 2.57*    GFRESTIMATED 15* 18* 25* 25*   GFRESTBLACK 17* 21* 29* 30*   NAZIA 8.9 7.8* 8.3* 8.4*     Recent Labs   Lab Test 04/04/18  1338 12/20/17  1130 08/02/16  1305 08/02/16 12/11/15  1016 09/14/15  0719   BILITOTAL 0.4 0.5  --   --  0.4 0.9   ALKPHOS 186* 218*  --  141* 207* 168*   ALT 16 18  --  14 36 24   AST 24 24 18  --  29 30     CBC  Recent Labs   Lab Test 04/10/19  1203 02/22/19  1202 12/19/18  1254 11/21/18  1501   HGB 7.3* 8.4* 10.7* 10.9*   WBC 4.2 5.7 3.9* 4.1   RBC 2.30* 2.55* 3.37* 3.52*   HCT 22.9* 26.1* 32.0* 34.0*    102* 95 97   MCH 31.7 32.9 31.8 31.0   MCHC 31.9 32.2 33.4 32.1   RDW 14.6 15.2* 14.7 13.7    126* 213 202     URINE STUDIES  Recent Labs   Lab Test 09/12/18 12/11/15  1038 09/14/15  0958 03/02/15  1407 02/11/15  1045   COLOR Yellow Light Yellow Light Yellow Yellow Light Yellow   APPEARANCE Clear Clear Clear Clear Clear   URINEGLC 250  300* Negative Negative Negative   URINEBILI Neg Negative Negative Negative Negative   URINEKETONE Neg Negative Negative Negative Negative   SG 1.025 1.007 1.010 1.012 1.010   UBLD Small Negative Trace* Small* Negative   URINEPH 5.5 5.5 6.0 6.0 6.0   PROTEIN 300  30* 300* 300* 100*   UROBILINOGEN 0.2  --   --   --   --    NITRITE Neg Negative Negative Negative Negative   LEUKEST Neg Negative Negative Negative Negative   RBCU  --  1 1 4* 3*   WBCU  --  <1 2 <1 1     Recent Labs   Lab Test 12/19/18  1423 11/21/18  1513 10/15/18  1130 09/25/18  1357   UTPG 5.68* 4.91* 2.13* 2.74*     PTH  Recent Labs   Lab Test 04/10/19  1203 10/15/18  1117 09/25/18  1320   PTHI 332* 868* 559*     IRON STUDIES  Recent Labs   Lab Test 02/22/19  1202 10/15/18  1117 09/25/18  1320   IRON 75 87 70   * 195* 195*   IRONSAT 37 44 36   HUMPHREY 445* 345 250       Tiny Duff, NP

## 2019-04-11 NOTE — PROGRESS NOTES
Nephrology Note: Nursing Outreach Encounter    REASON FOR CALL:                                                      REASON FOR CALL: Care Coordination. Request from Sheryl Mike NP message:   From: Tiny Mike NP   Sent: 4/11/2019   9:25 AM   To: STEVE Dillon can you call patient's assisted living facility and ask the nurse there to order a cognitive assessment?   We are approaching time for dialysis vs no dialysis and I want to make sure he is cognitively intact because he doesn't seem intact, he seems impaired. The results would guide our decision making.   Their number is [phone]   It should be done before our next visit in one month. They should fax us the results or just send with him next visit   Thanks   Sheryl                                         SITUATION/BACKROUND:                                                    Patient is being treated for CKD Stage 5 with proteinuria. Per Sheryl Mike visit note 4/11/19  1. Medication compliance - Patient with long history of non compliance resulting in very poor diet, poorly controlled DM and poorly controlled HTN. Since being at [facility] and now living at [facility] his glycemic control has improved, his blood pressure well controlled, edema has improved and albumin has risen.    - PCP contact info: ( Dr Celso Esparza - [place] phone [phone], fax [phone])).       2. CKD5 w/proteinuria - Renal function continues to worsen due to poorly controlled DM/HTN. Creat up to 3.8 today, eGFR 15 ml/mn. He has poor muscle mass so his renal function is likely worse than creat suggests   - UPCR 5.6 g/gCr 12/18 in the setting of very poorly controlled DM with A1c of 11.5% in November 2018   - Etiology of CKD is bx proven DM/HTN related CKD   - Discussed with patient today the progression of his renal failure. He voiced surprise about even having kidney disease. Patient shows little insight into his health conditions or our many previous  conversations. He requested renal transplant. We discussed that he would not be a transplant candidate given his multiple co morbidities. We discussed hemodialysis vs medical management. Brother Jareth is on HD and provided personal experience. I asked patient to think about how he would like to proceed and we would discuss more next visit.       ASSESSMENT:                                                     Patient working with Occupational Therapy at time of call to [facility].       PLAN:                                                      Follow Up:   Nurse who I spoke with at [facility] told me that she would have the Occupational Therapist call me on my direct line at #[phone] once they were done with their therapy session.   The OT therapist could assist us in obtaining a cognitive assessment    Will await call and route this to Sheryl Duff for hillary Pisano RN on 4/11/2019 at 12:06 PM

## 2019-04-15 ENCOUNTER — TELEPHONE (OUTPATIENT)
Dept: NEPHROLOGY | Facility: CLINIC | Age: 74
End: 2019-04-15

## 2019-04-15 NOTE — TELEPHONE ENCOUNTER
"Called and spoke to Yojana, Occupational Therapist from [facility] #[phone] regarding cognitive testing that was done recently with patient last Friday 4/12/19 in request to Tiny Mike NP message see below:    Julianna can you call patient's assisted living facility and ask the nurse there to order a cognitive assessment?   We are approaching time for dialysis vs no dialysis and I want to make sure he is cognitively intact because he doesn't seem intact, he seems impaired. The results would guide our decision making.   Their number is [phone]   It should be done before our next visit in one month. They should fax us the results or just send with him next visit   Thanks   Sheryl Dial from OT states that she performed two cognitive tests on [place]. One was the SLUMS test in which he scored a 20/30 which indicates \"borderline dementia\" and second was the Edward Cognitive Level (ACL) in which he scored a 3.4 \"which definitely indicated 24 hour supervision\"    Yojana states that she will fax these hard copy results to our [facility] fax at [phone]    Yojana has my direct number [phone] if she has any additional results,questions or comments     Julianna Reid, RN, BSN  Nephrology Care Coordinator  [facility]  [email or web address]  [phone]        "

## 2019-04-15 NOTE — TELEPHONE ENCOUNTER
[facility] Call Center    Phone Message    May a detailed message be left on voicemail: yes    Reason for Call: Other: Yojana calling to give Julianna the results she had requested regarding cognative testing.  Please call Yojana back to discuss     Action Taken: Message routed to:  [facility] ([facility]

## 2019-04-16 NOTE — TELEPHONE ENCOUNTER
Cognitive assessment received from Yojana from Occupational Health on 4/15/19 and scanned into Epic as well as hardcopy given to Sheryl Duff NP for review     Julianna Pisano RN on 4/16/2019 at 2:53 PM

## 2019-05-08 ENCOUNTER — OFFICE VISIT (OUTPATIENT)
Dept: NEPHROLOGY | Facility: CLINIC | Age: 74
End: 2019-05-08
Payer: MEDICARE

## 2019-05-08 VITALS
DIASTOLIC BLOOD PRESSURE: 70 MMHG | SYSTOLIC BLOOD PRESSURE: 133 MMHG | TEMPERATURE: 97.4 F | HEIGHT: 71 IN | BODY MASS INDEX: 26.83 KG/M2 | OXYGEN SATURATION: 99 % | HEART RATE: 74 BPM

## 2019-05-08 DIAGNOSIS — N18.5 ANEMIA OF CHRONIC RENAL FAILURE, STAGE 5 (H): ICD-10-CM

## 2019-05-08 DIAGNOSIS — E87.20 METABOLIC ACIDOSIS: ICD-10-CM

## 2019-05-08 DIAGNOSIS — N25.81 SECONDARY RENAL HYPERPARATHYROIDISM (H): ICD-10-CM

## 2019-05-08 DIAGNOSIS — D63.1 ANEMIA OF CHRONIC RENAL FAILURE, STAGE 5 (H): ICD-10-CM

## 2019-05-08 DIAGNOSIS — I10 ESSENTIAL HYPERTENSION: ICD-10-CM

## 2019-05-08 DIAGNOSIS — N18.4 CKD (CHRONIC KIDNEY DISEASE) STAGE 4, GFR 15-29 ML/MIN (H): Primary | ICD-10-CM

## 2019-05-08 DIAGNOSIS — E11.21 TYPE 2 DIABETES MELLITUS WITH DIABETIC NEPHROPATHY, WITH LONG-TERM CURRENT USE OF INSULIN (H): ICD-10-CM

## 2019-05-08 DIAGNOSIS — N18.5 CKD (CHRONIC KIDNEY DISEASE) STAGE 5, GFR LESS THAN 15 ML/MIN (H): Primary | ICD-10-CM

## 2019-05-08 DIAGNOSIS — Z79.4 TYPE 2 DIABETES MELLITUS WITH DIABETIC NEPHROPATHY, WITH LONG-TERM CURRENT USE OF INSULIN (H): ICD-10-CM

## 2019-05-08 LAB
ALBUMIN SERPL-MCNC: 3.1 G/DL (ref 3.4–5)
ANION GAP SERPL CALCULATED.3IONS-SCNC: 8 MMOL/L (ref 3–14)
BUN SERPL-MCNC: 50 MG/DL (ref 7–30)
CALCIUM SERPL-MCNC: 9.2 MG/DL (ref 8.5–10.1)
CHLORIDE SERPL-SCNC: 113 MMOL/L (ref 94–109)
CO2 SERPL-SCNC: 20 MMOL/L (ref 20–32)
CREAT SERPL-MCNC: 3.78 MG/DL (ref 0.66–1.25)
GFR SERPL CREATININE-BSD FRML MDRD: 15 ML/MIN/{1.73_M2}
GLUCOSE SERPL-MCNC: 101 MG/DL (ref 70–99)
PHOSPHATE SERPL-MCNC: 4.9 MG/DL (ref 2.5–4.5)
POTASSIUM SERPL-SCNC: 5.6 MMOL/L (ref 3.4–5.3)
SODIUM SERPL-SCNC: 140 MMOL/L (ref 133–144)

## 2019-05-08 PROCEDURE — 80069 RENAL FUNCTION PANEL: CPT

## 2019-05-08 PROCEDURE — G0463 HOSPITAL OUTPT CLINIC VISIT: HCPCS | Mod: ZF

## 2019-05-08 PROCEDURE — 36415 COLL VENOUS BLD VENIPUNCTURE: CPT

## 2019-05-08 RX ORDER — SODIUM BICARBONATE 650 MG/1
1300 TABLET ORAL 2 TIMES DAILY
Qty: 200 TABLET | Refills: 3 | Status: SHIPPED | OUTPATIENT
Start: 2019-05-08

## 2019-05-08 ASSESSMENT — PAIN SCALES - GENERAL: PAINLEVEL: NO PAIN (0)

## 2019-05-08 NOTE — LETTER
"5/8/2019      RE: Austin Walker  [address] [address]       Nephrology Clinic Visit 5/8/19    Assessment and Plan:       1. Medication compliance - Patient had significant h/o non compliance prior to his recent move into assisting living. Unfortunately this resulted in very poor diet, poorly controlled DM and poorly controlled HTN. Since being at [facility] his glycemic control has improved, his blood pressure well controlled, edema has improved and albumin has risen. His cognition and mobility have improved as well.    -  Not sure he has maintained his previous PCP. Previous PCP was Dr Celso Esparza - [facility] phone [phone], fax [phone].       2. CKD5 w/proteinuria - Creat 3.7, eGFR 15 ml/mn. Renal function worsened due to poorly controlled DM/HTN. He has poor muscle mass so his renal function is likely worse than creat suggests   - UPCR 5.6 g/gCr 12/18 in the setting of very poorly controlled DM with A1c of 11.5% in November 2018   - Etiology of CKD is bx proven DM/HTN related CKD   - I am continuing my discussion with patient regarding his advanced renal failure. He asked about kidney transplant. He remembered he was told it would take 5 yrs for DDKT. In fact he did begin the process in 2015 but w/u was incomplete. The team tried to contact the patient regarding his w/u to no avail. He was de listed on 1/24/18. He has been very non compliant prior to his move to [facility]. Now he is doing much better. I'm not sure he is an appropriate kidney transplant candidate, but patient would like to be re referred.    - We also discussed dialysis given that he has no living donors and the wait list would be 5 yrs. Certainly if no transplant he would live out the remainder of his life on HD. He understands. I will d/w him further next visit. Would like him to see Vascular surgery for access recommendation. I did also review \"no dialysis\" option as well with the explanation of continuing medical management " but ultimately death from renal failure.     - Does not use NSAIDs   - Blood pressure now at goal < 140/80   - Glycemic control is good. Will check A1c next visit      3. Volume status - Has edema, but continues to improve. Denies dyspnea. Albumin 3.1. No weight today. B/P 130/ x 3. On Bumex 1 mg bid   - Continue Bumex at current dose   - Not currently on renal or sodium controlled diet at [facility]. Will order       4. HTN - Controlled. Clinic blood pressures today; 130/ x 3. He has improved edema.    Current prescribed regimen:    Doxazosin 4 mg HS   Bumex 1 mg bid   Lopressor 75 mg bid   Minoxidil 5 mg every day   Amlodipine 5 mg every day       5. Electrolytes - Borderline hyperkalemia. K 5.6.    - Not following a renal diet. Will order    - Recheck labs in 2 wks      6. Acid base - No acute concerns. Bicarb 20.   - Increase Bicarb to 1300 mg bid.        7. BMD - Corrected Ca 9. 9, Phos 4.9, albumin 3.1   - Vit D 22 (4/19)   -  (much improved!) ( 4/19)    - Continue Calcitriol 0.25 mcq every day      8. Anemia - Hgb 7.3    - Not currently receiving DANILO, but had been through his Oncologist. Spoke with Dr Deal today and OK to begin DANILO.     - On iron bid   -Iron studies 4/19: Ferritin 535, IS 36, Fe 69   - Will refer to Anemia management.      9. MGUS - Follows with [facility], Dr Nicholas Deal.     10. DM2 - Improved control now that he is getting his insulin regularly and meals are more structured. Random BS today was 101!   - Will check A1c    11. Cognitive impairment? - Unclear if patient is cognitively impaired or just in denial. He continues to be his own legal decision maker.    - Cognition much improved today. Unclear if just a result of improved glycemic control, overall improvement in his health?     12. Disposition - RTC 2 months for f/u.       Reason for Visit:  CKD5/HTN f/u      HPI:  Mr Walker is a 72 yo male with CKD5, HTN, DM2, MGUS, previous CVA, chronic noncompliance in today for  routine CKD f/u. Last seen by me on 19. Baseline creat had been in the upper 2-3 range since , but now moving into the upper 3 range with progression of his disease.        Interval Hx:   Moved to [facility]      ROS:  Patient doing very well since he move to senior living. He is stronger, now walking more. He is getting his medications regularly so his blood pressure is now controlled, his blood sugars controlled, edema much improved, cognition improved.   Having some issues with hypoglycemia   I was able to update patient's med list with senior living medication list  Patient more articulate and oriented today. He accurately questioned his transplant status. We reviewed the last entry from the transplant team de listing the patient on 18 due to inability to contact patient.   Denies dyspnea, CP, abdominal pain. Appetite is good. Doesn't think he is following a renal diet      Chronic Health Problems:      CKD5  DM2  H/O CVA  HTN  MGUS  Anemia  PRINCE on CPAP  Severe Urinary frequency/incontinence  HLD      Personal Hx:   Single, Wife  several years ago. Currently [facility] at [facility] (ph [phone]). Does not drive, NS, ETOH none, uses walker     Allergies:  Allergies   Allergen Reactions     Lisinopril      Other reaction(s): Renal Failure     Armodafinil Rash       Medications:  Current Outpatient Medications   Medication Sig     acetaminophen (TYLENOL) 500 MG tablet Take 500-1,000 mg by mouth every 6 hours as needed for mild pain     amLODIPine (NORVASC) 5 MG tablet Take 1 tablet (5 mg) by mouth daily     amphetamine-dextroamphetamine (ADDERALL) 20 MG tablet Take 20 mg by mouth daily     atorvastatin (LIPITOR) 20 MG tablet Take 20 mg by mouth every evening 1700     blood glucose monitoring (NOVA SUREFLEX) lancets 3x/day with blood glucose testing     bumetanide (BUMEX) 1 MG tablet Take 1 tablet (1 mg) by mouth 2 times daily     calcitRIOL (ROCALTROL) 0.25 MCG capsule Take 1 capsule (0.25 mcg)  by mouth daily     cetirizine (ZYRTEC) 5 MG tablet Take 5 mg by mouth daily     doxazosin (CARDURA) 2 MG tablet Take 4 mg by mouth At Bedtime     esomeprazole (NEXIUM) 20 MG DR capsule Take 1 capsule (20 mg) by mouth every morning (before breakfast) Take 30-60 minutes before eating.     ferrous sulfate (FEROSUL) 325 (65 Fe) MG tablet Take 1 tablet (325 mg) by mouth 2 times daily     fluticasone (FLONASE) 50 MCG/ACT nasal spray Spray 1 spray into both nostrils 2 times daily as needed      gabapentin (NEURONTIN) 300 MG capsule Take 600 mg by mouth At Bedtime     hydrocortisone (CORTAID) 1 % external cream Apply topically as needed     insulin aspart (NOVOLOG PEN) 100 UNIT/ML injection Inject subcutaneous 3 times daily with meals and bedtime Medium Scale less than 70 .................. Refer to Hypoglycemia Treatment Protocol 70 - 149 ......................... No corrective insulin 150 - 199 ....................... 2 units 200 - 249 ....................... 4 units 250 - 299 ....................... 6 units 300 - 349 ....................... 8 units 350 or greater ................ 10 units and notify MD       insulin detemir (LEVEMIR FLEXPEN/FLEXTOUCH) 100 UNIT/ML soln Inject 20 Units Subcutaneous 2 times daily      insulin pen needle (B-D U/F) 31G X 8 MM USE AS DIRECTED     lidocaine (LIDODERM) 5 % patch Place 1 patch onto the skin every 24 hours     metoprolol tartrate (LOPRESSOR) 25 MG tablet Take 3 tablets (75 mg) by mouth 2 times daily     minoxidil (LONITEN) 2.5 MG tablet Take 5 mg by mouth daily     Multiple Vitamin (MULTI-VITAMINS) TABS Take 1 tablet by mouth daily      order for DME 4 wheeled walker with brakes and a seat.     sodium bicarbonate 650 MG tablet Take 2 tablets (1,300 mg) by mouth 2 times daily     traZODone (DESYREL) 50 MG tablet Take 25 mg by mouth At Bedtime     No current facility-administered medications for this visit.       Vitals:  /70   Pulse 74   Temp 97.4  F (36.3  C) (Oral)   Ht  "1.803 m (5' 11\")   SpO2 99%   BMI 26.83 kg/m       Exam:  GEN: Elderly male in NAD. Pleasant, in W/C. Much more alert than previous visits  CARDIAC: RRR  LUNGS: CTA  ABDOMEN: Soft, NT  EXT:  1+ edema  NEURO: Alert and interactive. More cognitively intact.     LABS:   CMP  Recent Labs   Lab Test 05/08/19  1101 04/10/19  1203 02/22/19  1202 12/19/18  1254    140 143 139   POTASSIUM 5.6* 5.2 5.5* 3.8   CHLORIDE 113* 114* 114* 110*   CO2 20 20 19* 21   ANIONGAP 8 6 11 8   * 104* 193* 188*   BUN 50* 44* 54* 29   CR 3.78* 3.85* 3.25* 2.50*   GFRESTIMATED 15* 15* 18* 25*   GFRESTBLACK 17* 17* 21* 29*   NAZIA 9.2 8.9 7.8* 8.3*     Recent Labs   Lab Test 04/04/18  1338 12/20/17  1130 08/02/16  1305 08/02/16 12/11/15  1016 09/14/15  0719   BILITOTAL 0.4 0.5  --   --  0.4 0.9   ALKPHOS 186* 218*  --  141* 207* 168*   ALT 16 18  --  14 36 24   AST 24 24 18  --  29 30     CBC  Recent Labs   Lab Test 04/10/19  1203 02/22/19  1202 12/19/18  1254 11/21/18  1501   HGB 7.3* 8.4* 10.7* 10.9*   WBC 4.2 5.7 3.9* 4.1   RBC 2.30* 2.55* 3.37* 3.52*   HCT 22.9* 26.1* 32.0* 34.0*    102* 95 97   MCH 31.7 32.9 31.8 31.0   MCHC 31.9 32.2 33.4 32.1   RDW 14.6 15.2* 14.7 13.7    126* 213 202     URINE STUDIES  Recent Labs   Lab Test 09/12/18 12/11/15  1038 09/14/15  0958 03/02/15  1407 02/11/15  1045   COLOR Yellow Light Yellow Light Yellow Yellow Light Yellow   APPEARANCE Clear Clear Clear Clear Clear   URINEGLC 250  300* Negative Negative Negative   URINEBILI Neg Negative Negative Negative Negative   URINEKETONE Neg Negative Negative Negative Negative   SG 1.025 1.007 1.010 1.012 1.010   UBLD Small Negative Trace* Small* Negative   URINEPH 5.5 5.5 6.0 6.0 6.0   PROTEIN 300  30* 300* 300* 100*   UROBILINOGEN 0.2  --   --   --   --    NITRITE Neg Negative Negative Negative Negative   LEUKEST Neg Negative Negative Negative Negative   RBCU  --  1 1 4* 3*   WBCU  --  <1 2 <1 1     Recent Labs   Lab Test 12/19/18  1423 " 11/21/18  1513 10/15/18  1130 09/25/18  1357   UTPG 5.68* 4.91* 2.13* 2.74*     PTH  Recent Labs   Lab Test 04/10/19  1203 10/15/18  1117 09/25/18  1320   PTHI 332* 868* 559*     IRON STUDIES  Recent Labs   Lab Test 04/10/19  1203 02/22/19  1202 10/15/18  1117   IRON 69 75 87   * 203* 195*   IRONSAT 36 37 44   HUMPHREY 535* 445* 345       Tiny Duff, NP

## 2019-05-08 NOTE — PATIENT INSTRUCTIONS
1. Increase sodium bicarb to 1300 mg bid  2. Follow a renal diet  3. Have labs checked in 2 wks  4. We will refer you to our anemia manager, Otilia Serrato to start Aranesp injections  5. Will re refer you to transplant given your improvement in assisted living

## 2019-05-08 NOTE — NURSING NOTE
"Chief Complaint   Patient presents with     RECHECK     CKD (chronic kidney disease) stage 4     /70   Pulse 74   Temp 97.4  F (36.3  C) (Oral)   Ht 1.803 m (5' 11\")   SpO2 99%   BMI 26.83 kg/m    Angella Sinha CMA    "

## 2019-05-08 NOTE — PROGRESS NOTES
"Nephrology Clinic Visit 5/8/19    Assessment and Plan:       1. Medication compliance - Patient had significant h/o non compliance prior to his recent move into assisting living. Unfortunately this resulted in very poor diet, poorly controlled DM and poorly controlled HTN. Since being at [facility] his glycemic control has improved, his blood pressure well controlled, edema has improved and albumin has risen. His cognition and mobility have improved as well.    - Not sure he has maintained his previous PCP. Previous PCP was Dr Celso Esparza - [facility] phone [phone], fax [phone].       2. CKD5 w/proteinuria - Creat 3.7, eGFR 15 ml/mn. Renal function worsened due to poorly controlled DM/HTN. He has poor muscle mass so his renal function is likely worse than creat suggests   - UPCR 5.6 g/gCr 12/18 in the setting of very poorly controlled DM with A1c of 11.5% in November 2018   - Etiology of CKD is bx proven DM/HTN related CKD   - I am continuing my discussion with patient regarding his advanced renal failure. He asked about kidney transplant. He remembered he was told it would take 5 yrs for DDKT. In fact he did begin the process in 2015 but w/u was incomplete. The team tried to contact the patient regarding his w/u to no avail. He was de listed on 1/24/18. He has been very non compliant prior to his move to [facility]. Now he is doing much better. I'm not sure he is an appropriate kidney transplant candidate, but patient would like to be re referred.    - We also discussed dialysis given that he has no living donors and the wait list would be 5 yrs. Certainly if no transplant he would live out the remainder of his life on HD. He understands. I will d/w him further next visit. Would like him to see Vascular surgery for access recommendation. I did also review \"no dialysis\" option as well with the explanation of continuing medical management but ultimately death from renal failure.     - Does not use NSAIDs   - Blood pressure " now at goal < 140/80   - Glycemic control is good. Will check A1c next visit      3. Volume status - Has edema, but continues to improve. Denies dyspnea. Albumin 3.1. No weight today. B/P 130/ x 3. On Bumex 1 mg bid   - Continue Bumex at current dose   - Not currently on renal or sodium controlled diet at [facility]. Will order       4. HTN - Controlled. Clinic blood pressures today; 130/ x 3. He has improved edema.    Current prescribed regimen:    Doxazosin 4 mg HS   Bumex 1 mg bid   Lopressor 75 mg bid   Minoxidil 5 mg every day   Amlodipine 5 mg every day       5. Electrolytes - Borderline hyperkalemia. K 5.6.    - Not following a renal diet. Will order    - Recheck labs in 2 wks      6. Acid base - No acute concerns. Bicarb 20.   - Increase Bicarb to 1300 mg bid.        7. BMD - Corrected Ca 9.9, Phos 4.9, albumin 3.1   - Vit D 22 (4/19)   -  (much improved!) ( 4/19)    - Continue Calcitriol 0.25 mcq every day      8. Anemia - Hgb 7.3    - Not currently receiving DANILO, but had been through his Oncologist. Spoke with Dr Deal today and OK to begin DANILO.     - On iron bid   -Iron studies 4/19: Ferritin 535, IS 36, Fe 69   - Will refer to Anemia management.      9. MGUS - Follows with [facility], Dr Nicholas Deal.     10. DM2 - Improved control now that he is getting his insulin regularly and meals are more structured. Random BS today was 101!   - Will check A1c    11. Cognitive impairment? - Unclear if patient is cognitively impaired or just in denial. He continues to be his own legal decision maker.    - Cognition much improved today. Unclear if just a result of improved glycemic control, overall improvement in his health?     12. Disposition - RTC 2 months for f/u.       Reason for Visit:  CKD5/HTN f/u      HPI:  Mr Walker is a 74 yo male with CKD5, HTN, DM2, MGUS, previous CVA, chronic noncompliance in today for routine CKD f/u. Last seen by me on 2/2/19. Baseline creat had been in the upper 2-3  range since , but now moving into the upper 3 range with progression of his disease.        Interval Hx:   Moved to [facility]      ROS:  Patient doing very well since [facility]. He is stronger, now walking more. He is getting his medications regularly so his blood pressure is now controlled, his blood sugars controlled, edema much improved, cognition improved.   Having some issues with hypoglycemia   I was able to update patient's med list with [facility] medication list  Patient more articulate and oriented today. He accurately questioned his transplant status. We reviewed the last entry from the transplant team de listing the patient on 18 due to inability to contact patient.   Denies dyspnea, CP, abdominal pain. Appetite is good. Doesn't think he is following a renal diet      Chronic Health Problems:      CKD5  DM2  H/O CVA  HTN  MGUS  Anemia  PRINCE on CPAP  Severe Urinary frequency/incontinence  HLD      Personal Hx:   Single, Wife  several years ago. Currently living at [facility] ( [phone]). Does not drive, NS, ETOH none, uses walker     Allergies:  Allergies   Allergen Reactions     Lisinopril      Other reaction(s): Renal Failure     Armodafinil Rash       Medications:  Current Outpatient Medications   Medication Sig     acetaminophen (TYLENOL) 500 MG tablet Take 500-1,000 mg by mouth every 6 hours as needed for mild pain     amLODIPine (NORVASC) 5 MG tablet Take 1 tablet (5 mg) by mouth daily     amphetamine-dextroamphetamine (ADDERALL) 20 MG tablet Take 20 mg by mouth daily     atorvastatin (LIPITOR) 20 MG tablet Take 20 mg by mouth every evening 1700     blood glucose monitoring (NOVA SUREFLEX) lancets 3x/day with blood glucose testing     bumetanide (BUMEX) 1 MG tablet Take 1 tablet (1 mg) by mouth 2 times daily     calcitRIOL (ROCALTROL) 0.25 MCG capsule Take 1 capsule (0.25 mcg) by mouth daily     cetirizine (ZYRTEC) 5 MG tablet Take 5 mg by mouth daily      "doxazosin (CARDURA) 2 MG tablet Take 4 mg by mouth At Bedtime     esomeprazole (NEXIUM) 20 MG DR capsule Take 1 capsule (20 mg) by mouth every morning (before breakfast) Take 30-60 minutes before eating.     ferrous sulfate (FEROSUL) 325 (65 Fe) MG tablet Take 1 tablet (325 mg) by mouth 2 times daily     fluticasone (FLONASE) 50 MCG/ACT nasal spray Spray 1 spray into both nostrils 2 times daily as needed      gabapentin (NEURONTIN) 300 MG capsule Take 600 mg by mouth At Bedtime     hydrocortisone (CORTAID) 1 % external cream Apply topically as needed     insulin aspart (NOVOLOG PEN) 100 UNIT/ML injection Inject subcutaneous 3 times daily with meals and bedtime Medium Scale less than 70 .................. Refer to Hypoglycemia Treatment Protocol 70 - 149 ......................... No corrective insulin 150 - 199 ....................... 2 units 200 - 249 ....................... 4 units 250 - 299 ....................... 6 units 300 - 349 ....................... 8 units 350 or greater ................ 10 units and notify MD       insulin detemir (LEVEMIR FLEXPEN/FLEXTOUCH) 100 UNIT/ML soln Inject 20 Units Subcutaneous 2 times daily      insulin pen needle (B-D U/F) 31G X 8 MM USE AS DIRECTED     lidocaine (LIDODERM) 5 % patch Place 1 patch onto the skin every 24 hours     metoprolol tartrate (LOPRESSOR) 25 MG tablet Take 3 tablets (75 mg) by mouth 2 times daily     minoxidil (LONITEN) 2.5 MG tablet Take 5 mg by mouth daily     Multiple Vitamin (MULTI-VITAMINS) TABS Take 1 tablet by mouth daily      order for DME 4 wheeled walker with brakes and a seat.     sodium bicarbonate 650 MG tablet Take 2 tablets (1,300 mg) by mouth 2 times daily     traZODone (DESYREL) 50 MG tablet Take 25 mg by mouth At Bedtime     No current facility-administered medications for this visit.       Vitals:  /70   Pulse 74   Temp 97.4  F (36.3  C) (Oral)   Ht 1.803 m (5' 11\")   SpO2 99%   BMI 26.83 kg/m      Exam:  GEN: Elderly male in " NAD. Pleasant, in W/C. Much more alert than previous visits  CARDIAC: RRR  LUNGS: CTA  ABDOMEN: Soft, NT  EXT: 1+ edema  NEURO: Alert and interactive. More cognitively intact.     LABS:   CMP  Recent Labs   Lab Test 05/08/19  1101 04/10/19  1203 02/22/19  1202 12/19/18  1254    140 143 139   POTASSIUM 5.6* 5.2 5.5* 3.8   CHLORIDE 113* 114* 114* 110*   CO2 20 20 19* 21   ANIONGAP 8 6 11 8   * 104* 193* 188*   BUN 50* 44* 54* 29   CR 3.78* 3.85* 3.25* 2.50*   GFRESTIMATED 15* 15* 18* 25*   GFRESTBLACK 17* 17* 21* 29*   NAZIA 9.2 8.9 7.8* 8.3*     Recent Labs   Lab Test 04/04/18  1338 12/20/17  1130 08/02/16  1305 08/02/16 12/11/15  1016 09/14/15  0719   BILITOTAL 0.4 0.5  --   --  0.4 0.9   ALKPHOS 186* 218*  --  141* 207* 168*   ALT 16 18  --  14 36 24   AST 24 24 18  --  29 30     CBC  Recent Labs   Lab Test 04/10/19  1203 02/22/19  1202 12/19/18  1254 11/21/18  1501   HGB 7.3* 8.4* 10.7* 10.9*   WBC 4.2 5.7 3.9* 4.1   RBC 2.30* 2.55* 3.37* 3.52*   HCT 22.9* 26.1* 32.0* 34.0*    102* 95 97   MCH 31.7 32.9 31.8 31.0   MCHC 31.9 32.2 33.4 32.1   RDW 14.6 15.2* 14.7 13.7    126* 213 202     URINE STUDIES  Recent Labs   Lab Test 09/12/18 12/11/15  1038 09/14/15  0958 03/02/15  1407 02/11/15  1045   COLOR Yellow Light Yellow Light Yellow Yellow Light Yellow   APPEARANCE Clear Clear Clear Clear Clear   URINEGLC 250  300* Negative Negative Negative   URINEBILI Neg Negative Negative Negative Negative   URINEKETONE Neg Negative Negative Negative Negative   SG 1.025 1.007 1.010 1.012 1.010   UBLD Small Negative Trace* Small* Negative   URINEPH 5.5 5.5 6.0 6.0 6.0   PROTEIN 300  30* 300* 300* 100*   UROBILINOGEN 0.2  --   --   --   --    NITRITE Neg Negative Negative Negative Negative   LEUKEST Neg Negative Negative Negative Negative   RBCU  --  1 1 4* 3*   WBCU  --  <1 2 <1 1     Recent Labs   Lab Test 12/19/18  1423 11/21/18  1513 10/15/18  1130 09/25/18  1357   UTPG 5.68* 4.91* 2.13* 2.74*      PTH  Recent Labs   Lab Test 04/10/19  1203 10/15/18  1117 09/25/18  1320   PTHI 332* 868* 559*     IRON STUDIES  Recent Labs   Lab Test 04/10/19  1203 02/22/19  1202 10/15/18  1117   IRON 69 75 87   * 203* 195*   IRONSAT 36 37 44   HUMPHREY 535* 445* 345       Tiny Duff, NP

## 2019-05-09 ENCOUNTER — TELEPHONE (OUTPATIENT)
Dept: NEPHROLOGY | Facility: CLINIC | Age: 74
End: 2019-05-09

## 2019-05-09 NOTE — TELEPHONE ENCOUNTER
Austin Walker  [id number]      Date: 5/9/2019    Time of Call: 8:28 AM     Diagnosis:  Chronic Kidney Disease stage 5 with proteinuria     [ VORB ] Ordering provider: Sheryl Duff NP  Order:     - Renal diet    - chemistry labs in 2 wks    - Change sodium bicarb to 1300 mg bid      Order received by: Julianna Reid RN    Attention to [place] at pt's assisted living residence     Follow-up/additional notes: [phone]     Fax # [phone]    Julianna Reid RN, BSN  Nephrology Care Coordinator  [facility]  [email or web address]  [phone]

## 2019-05-09 NOTE — TELEPHONE ENCOUNTER
[facility]    Phone Message    May a detailed message be left on voicemail: yes    Reason for Call: Order(s): Other:   Reason for requested: Labs   Date needed: soon  Provider name: Omega      Action Taken: Message routed to:  [facility]): Karly at Assisted living would like all lab orders faxed to them at [phone] so they can schedule with [facility], Also, this Pt will not follow any diet plan. He eats what he wants. The scripts were also sent to [facility] and they need to be resent to [facility] in [place] - Please call her if any questions

## 2019-05-10 NOTE — TELEPHONE ENCOUNTER
Nephrology RN Care Coordinator faxed over to Karly at 's assisted living facilty requested lab orders as well as medication changes made from ESTHER Watts on patient's office visit 5/8 visit to fax #[phone]    Julianna Pisano RN on 5/10/2019 at 9:52 AM

## 2019-05-16 ENCOUNTER — TELEPHONE (OUTPATIENT)
Dept: TRANSPLANT | Facility: CLINIC | Age: 74
End: 2019-05-16

## 2019-05-16 NOTE — TELEPHONE ENCOUNTER
Austin Walker called to Transplant office reportedly he states someone was to call him back last Thursday and he has not heard from them.    Mr. Walker is difficult to understand.  I read notes from last week written by Linda Reid RN .  She will call Mr. Walker back to determine his concern.     Mr. Walker has been referred for kidney evaluation after the team removed him from the list.  Madiha to present to the team to determine what the plan is moving forward.

## 2019-05-29 ENCOUNTER — TELEPHONE (OUTPATIENT)
Dept: NEPHROLOGY | Facility: CLINIC | Age: 74
End: 2019-05-29

## 2019-05-29 NOTE — TELEPHONE ENCOUNTER
"Patient here to see Sheryl Omega. No appointment scheduled today. Next appointment with Sheryl July 8, 19.  Called patient's assisted living #[phone] to see if they had any insight. Woman who answered stated that the patient told them himself that he had an appointment.  Nothing indicated in Sheryl's last nephrology office visit note on 5/8/19 that the appointment was scheduled for today. I attempted to reach Madiha Carias RN from [facility] as there was some recent documentation of communication between them, but she was unavailable and there was no mention of appointments from [facility] in [facility] that I could see.     When asked if there was anyone else I could call that may know of something (family/friends), patient stated that there were not. Patient stated, \"they sent me a piece of paper with apt date on it\" however, patient did not have this paper with him at clinic.     [facility] called and patient to transfer back to his Assisted Living. Staff aware at [facility].  Patient stable for transfer and belongings sent with patient including his folder, phone, book and wheelchair.     at door assisting patient.     Julianna Pisano RN on 5/29/2019 at 2:10 PM        "

## 2019-07-09 ENCOUNTER — OFFICE VISIT (OUTPATIENT)
Dept: NEPHROLOGY | Facility: CLINIC | Age: 74
End: 2019-07-09
Payer: MEDICARE

## 2019-07-09 ENCOUNTER — CARE COORDINATION (OUTPATIENT)
Dept: NEPHROLOGY | Facility: CLINIC | Age: 74
End: 2019-07-09

## 2019-07-09 VITALS
HEART RATE: 111 BPM | HEIGHT: 71 IN | DIASTOLIC BLOOD PRESSURE: 88 MMHG | BODY MASS INDEX: 31.38 KG/M2 | OXYGEN SATURATION: 94 % | SYSTOLIC BLOOD PRESSURE: 130 MMHG

## 2019-07-09 DIAGNOSIS — Z79.4 TYPE 2 DIABETES MELLITUS WITH DIABETIC NEPHROPATHY, WITH LONG-TERM CURRENT USE OF INSULIN (H): ICD-10-CM

## 2019-07-09 DIAGNOSIS — N18.5 CKD (CHRONIC KIDNEY DISEASE) STAGE 5, GFR LESS THAN 15 ML/MIN (H): ICD-10-CM

## 2019-07-09 DIAGNOSIS — D63.1 ANEMIA OF CHRONIC RENAL FAILURE, STAGE 5 (H): ICD-10-CM

## 2019-07-09 DIAGNOSIS — N18.5 CKD (CHRONIC KIDNEY DISEASE) STAGE 5, GFR LESS THAN 15 ML/MIN (H): Primary | ICD-10-CM

## 2019-07-09 DIAGNOSIS — N18.5 ANEMIA IN STAGE 5 CHRONIC KIDNEY DISEASE (H): Primary | ICD-10-CM

## 2019-07-09 DIAGNOSIS — N25.81 SECONDARY RENAL HYPERPARATHYROIDISM (H): ICD-10-CM

## 2019-07-09 DIAGNOSIS — E87.5 HYPERKALEMIA: ICD-10-CM

## 2019-07-09 DIAGNOSIS — N18.4 CKD (CHRONIC KIDNEY DISEASE) STAGE 4, GFR 15-29 ML/MIN (H): ICD-10-CM

## 2019-07-09 DIAGNOSIS — E11.21 TYPE 2 DIABETES MELLITUS WITH DIABETIC NEPHROPATHY, WITH LONG-TERM CURRENT USE OF INSULIN (H): ICD-10-CM

## 2019-07-09 DIAGNOSIS — I10 ESSENTIAL HYPERTENSION: ICD-10-CM

## 2019-07-09 DIAGNOSIS — N18.5 ANEMIA OF CHRONIC RENAL FAILURE, STAGE 5 (H): ICD-10-CM

## 2019-07-09 DIAGNOSIS — E83.39 HYPERPHOSPHATEMIA: ICD-10-CM

## 2019-07-09 DIAGNOSIS — D63.1 ANEMIA IN STAGE 5 CHRONIC KIDNEY DISEASE (H): Primary | ICD-10-CM

## 2019-07-09 LAB
ALBUMIN SERPL-MCNC: 2.7 G/DL (ref 3.4–5)
ANION GAP SERPL CALCULATED.3IONS-SCNC: 9 MMOL/L (ref 3–14)
BUN SERPL-MCNC: 51 MG/DL (ref 7–30)
CALCIUM SERPL-MCNC: 8 MG/DL (ref 8.5–10.1)
CHLORIDE SERPL-SCNC: 109 MMOL/L (ref 94–109)
CO2 SERPL-SCNC: 24 MMOL/L (ref 20–32)
CREAT SERPL-MCNC: 4.49 MG/DL (ref 0.66–1.25)
FERRITIN SERPL-MCNC: 494 NG/ML (ref 26–388)
GFR SERPL CREATININE-BSD FRML MDRD: 12 ML/MIN/{1.73_M2}
GLUCOSE SERPL-MCNC: 241 MG/DL (ref 70–99)
HBA1C MFR BLD: 8.6 % (ref 0–5.6)
HGB BLD-MCNC: 7.5 G/DL (ref 13.3–17.7)
IRON SATN MFR SERPL: 30 % (ref 15–46)
IRON SERPL-MCNC: 54 UG/DL (ref 35–180)
PHOSPHATE SERPL-MCNC: 5.2 MG/DL (ref 2.5–4.5)
POTASSIUM SERPL-SCNC: 3.7 MMOL/L (ref 3.4–5.3)
SODIUM SERPL-SCNC: 141 MMOL/L (ref 133–144)
TIBC SERPL-MCNC: 182 UG/DL (ref 240–430)

## 2019-07-09 PROCEDURE — 83036 HEMOGLOBIN GLYCOSYLATED A1C: CPT

## 2019-07-09 PROCEDURE — 82728 ASSAY OF FERRITIN: CPT

## 2019-07-09 PROCEDURE — 83540 ASSAY OF IRON: CPT

## 2019-07-09 PROCEDURE — 80069 RENAL FUNCTION PANEL: CPT

## 2019-07-09 PROCEDURE — 36415 COLL VENOUS BLD VENIPUNCTURE: CPT

## 2019-07-09 PROCEDURE — 83550 IRON BINDING TEST: CPT

## 2019-07-09 PROCEDURE — 85018 HEMOGLOBIN: CPT

## 2019-07-09 PROCEDURE — G0463 HOSPITAL OUTPT CLINIC VISIT: HCPCS | Mod: ZF

## 2019-07-09 RX ORDER — CALCIUM ACETATE 667 MG/1
667 TABLET ORAL
Qty: 180 TABLET | Refills: 3
Start: 2019-07-09

## 2019-07-09 RX ORDER — METOPROLOL TARTRATE 100 MG
100 TABLET ORAL 2 TIMES DAILY
Qty: 180 TABLET | Refills: 3
Start: 2019-07-09

## 2019-07-09 ASSESSMENT — PAIN SCALES - GENERAL: PAINLEVEL: NO PAIN (0)

## 2019-07-09 NOTE — LETTER
July 10, 2019       RE: Austin Walker   1945         Orders/ Medication changes:         - Phoslo 667 mg one tablet by mouth three times daily w/meals        - Decrease Minoxidil to 2.5 mg every day x 7 days, then discontinue        - Increase Lopressor to 100 mg twice daily       - Decrease Kayexalate to 60 g once per day on , , and  only       Please make sure the patient brings an updated medication list and an order sheet to every clinic visit.    We've referred the patient to vascular surgery to discuss getting a fistula for dialysis.   We've also referred the patient to our anemia services program to manage his low hemoglobin. Radha Serrato RN is the nurse who manages this program and she will be contacting the patient and/ or facility to discuss follow up labs and possibly starting Aranesp or Procrit injections to improve hemoglobin.    Please call with any questions.     Thierno Mace, RN, BAN  Nephrology RN Care Coordinator     Phone: [phone]

## 2019-07-09 NOTE — LETTER
7/9/2019       RE: Austin Walker  [address] [address]     Dear Colleague,    Thank you for referring your patient, Austin Walker, to the [facility] NEPHROLOGY at [facility]. Please see a copy of my visit note below.    Nephrology Clinic Visit 7/9/19    Assessment and Plan:       1. Medication compliance - Patient had significant h/o non compliance prior to his recent move into assisting living. Unfortunately this resulted in very poor diet, poorly controlled DM and poorly controlled HTN. Since being at [facility] his glycemic control has improved, his blood pressure well controlled, edema has improved and albumin has risen. His mobility has improved, but cognition remains fluctuant.    -  He has kept his PCP, Dr Celso Esparza - [place] phone [phone], fax [phone].     - I updated him by phone today. Given that we are currently his primary Nephrology team, recommended that we would refer to our [facility] Surgery team for access and that I would decrease the Kayexalate to MWF.             2. CKD5 w/proteinuria - Creat 4.4, eGFR 12 ml/mn. Renal function has steadily declined due to poorly controlled DM/HTN. He has poor muscle mass so his renal function is likely worse than creat suggests   - UPCR 5.6 g/gCr 12/18 in the setting of very poorly controlled DM.    - Etiology of CKD is bx proven DM/HTN related CKD   - I am continuing my discussion with patient regarding his advanced renal failure. He asked about kidney transplant at our last visit and was able to remember that he was told it would take 5 yrs for DDKT. However today he was shocked he wasn't already on the list despite my reminding him that he had been de listed on 1/24/18 due to incomplete w/u and failure to respond to calls/messages by the transplant team.     - We have also been discussing dialysis. He is approaching need to begin HD given recurrent hyperkalemia/volume control issues. We have also  discussed no dialysis and death from renal failure as an option if he does not want HD, but he appears unable to process how severe his health is.    - He is agreeable to [facility] consult for access placement. If AVF is recommended would proceed with placement.       - Does not use NSAID   - Blood pressure now at goal < 130/80   - Glycemic control is improved. A1c 8.6%      3. Volume status - Ongoing hypervolemia. Denies dyspnea. Albumin 2.7. No weight today. B/P 126-130/. On Bumex 1 mg bid   - Continue Bumex at current dose   - Tried to restrict his diet at [facility], but they report that patient has control of his own diet in the section of the [facility] that he resides.        4. HTN - Controlled. Clinic blood pressures today; 126-130/. . He has improved but ongoing edema.    Current prescribed regimen:    Doxazosin 4 mg HS   Bumex 1 mg bid   Lopressor 75 mg bid/   Minoxidil 5 mg every day   Amlodipine 5 mg every day      - Decrease Minoxidil to 2.5 mg every day x 7 days then discontinue     - Increase Lopressor to 100 mg bid     - Goal is to simplify his regimen. Have plenty of room to increase CCB, BB and Doxazosin if needed      5. Electrolytes - K 3.7.    - Had recent admission for hyperkalemia and was started on Kayexalate bid by PCP   - Decrease kayexalate to MWF   - Uncontrolled hyperkalemia is an indication to begin HD   - He is non compliant with his diet      6. Acid base - No acute concerns. Bicarb 24.   - Continue Bicarb 1950 mg bid.        7. BMD - Ca  8.6, Phos 5.2, albumin 2.7   - Vit D 22 (4/19)   -  (much improved!) ( 4/19)    - Continue Calcitriol 0.25 mcq every day   - Start Phoslo 667 mg TID w/meals      8. Anemia - Hgb 8.5    - Not currently receiving DANILO, but had been through his Oncologist. Spoke with Dr Deal 5/8/19 and OK to begin DANILO.     - On iron bid   -Iron studies 7/19: Ferritin 494, IS 30, Fe 54   - Will refer to Anemia management to begin DANILO      9. MGUS - Follows with  [facility], Dr Nicholas Deal.     10. DM2 - Improved control now that he is getting his insulin regularly and meals are more structured.    -  A1c improved to 8.6    11. Cognitive impairment? - Unclear if patient is cognitively impaired or just in denial. He continues to be his own legal decision maker.     12. Disposition - RTC 1 month for f/u. Nursing contacting [facility] with medication changes      Reason for Visit:  CKD5/HTN f/u      HPI:  Mr Walker is a 72 yo male with CKD5, HTN, DM2, MGUS, previous CVA, chronic noncompliance in today for routine CKD f/u. Last seen by me on 5/8/19. Baseline creat had been in the upper 2-3 range since 8/16, but now moving into the 4 range with progression of his disease. He is nearing need for HD.         Interval Hx:   Brief hospital admission end of June for hyperkalemia. Primary provider started patient on bid Kayexalate since 6/25/19 and referred him to vascular surgery for access placement.       ROS:  Overall patient doing very well since he move to [facility]. He is stronger, now walking more. He is getting his medications regularly so his blood pressure is now controlled, his blood sugars controlled, edema much improved. His cognition remains questionable   I was able to update patient's med list with [facility] medication list  At the last visit patient was articulate and oriented to the point of questioning his transplant status. Today he can't even tell me what the outcome would be for him if he did not have dialysis despite me clearly telling him several times today  However, he was able to recount a recent hospital admission and knew it was for hyperkalemia. He knew where he was and for how long.   Denies dyspnea, CP, abdominal pain. Appetite is good. Doesn't think he is following a renal diet, Denies diarrhea despite being on Kayexalate BID.       Chronic Health Problems:      CKD5  DM2  H/O CVA  HTN  MGUS  Anemia  PRINCE on CPAP  Severe Urinary  "frequency/incontinence  HLD  Cognitive impairment      Personal Hx:   Single, Wife  several years ago. Currently living at [facility] (ph [phone]). Does not drive, NS, ETOH none, uses walker     Allergies:  Allergies   Allergen Reactions     Lisinopril      Other reaction(s): Renal Failure     Armodafinil Rash     Vitals:  /88   Pulse 111   Ht 1.803 m (5' 11\")   SpO2 94%   BMI 31.38 kg/m      Repeat blood pressure 126/80    Exam:  GEN: Elderly male in NAD. Pleasant, in W/C.   CARDIAC: RRR  LUNGS: CTA  ABDOMEN: Soft, NT  EXT:  2+ edema  NEURO: Alert and interactive. Having difficulty articulating his health status.     LABS:   CMP  Recent Labs   Lab Test 19  1123 19  1101 04/10/19  1203 19  1202    140 140 143   POTASSIUM 3.7 5.6* 5.2 5.5*   CHLORIDE 109 113* 114* 114*   CO2 24 20 20 19*   ANIONGAP 9 8 6 11   * 101* 104* 193*   BUN 51* 50* 44* 54*   CR 4.49* 3.78* 3.85* 3.25*   GFRESTIMATED 12* 15* 15* 18*   GFRESTBLACK 14* 17* 17* 21*   NAZIA 8.0* 9.2 8.9 7.8*     Recent Labs   Lab Test 18  1338 17  1130 16  1305 08/02/16 12/11/15  1016 09/14/15  0719   BILITOTAL 0.4 0.5  --   --  0.4 0.9   ALKPHOS 186* 218*  --  141* 207* 168*   ALT 16 18  --  14 36 24   AST 24 24 18  --  29 30     CBC  Recent Labs   Lab Test 19  1123 04/10/19  1203 19  1202 18  1254 18  1501   HGB 7.5* 7.3* 8.4* 10.7* 10.9*   WBC  --  4.2 5.7 3.9* 4.1   RBC  --  2.30* 2.55* 3.37* 3.52*   HCT  --  22.9* 26.1* 32.0* 34.0*   MCV  --  100 102* 95 97   MCH  --  31.7 32.9 31.8 31.0   MCHC  --  31.9 32.2 33.4 32.1   RDW  --  14.6 15.2* 14.7 13.7   PLT  --  180 126* 213 202     URINE STUDIES  Recent Labs   Lab Test 09/12/18 12/11/15  1038 09/14/15  0958 03/02/15  1407 02/11/15  1045   COLOR Yellow Light Yellow Light Yellow Yellow Light Yellow   APPEARANCE Clear Clear Clear Clear Clear   URINEGLC 250  300* Negative Negative Negative "   URINEBILI Neg Negative Negative Negative Negative   URINEKETONE Neg Negative Negative Negative Negative   SG 1.025 1.007 1.010 1.012 1.010   UBLD Small Negative Trace* Small* Negative   URINEPH 5.5 5.5 6.0 6.0 6.0   PROTEIN 300  30* 300* 300* 100*   UROBILINOGEN 0.2  --   --   --   --    NITRITE Neg Negative Negative Negative Negative   LEUKEST Neg Negative Negative Negative Negative   RBCU  --  1 1 4* 3*   WBCU  --  <1 2 <1 1     Recent Labs   Lab Test 12/19/18  1423 11/21/18  1513 10/15/18  1130 09/25/18  1357   UTPG 5.68* 4.91* 2.13* 2.74*     PTH  Recent Labs   Lab Test 04/10/19  1203 10/15/18  1117 09/25/18  1320   PTHI 332* 868* 559*     IRON STUDIES  Recent Labs   Lab Test 07/09/19  1123 04/10/19  1203 02/22/19  1202   IRON 54 69 75   * 192* 203*   IRONSAT 30 36 37   HUMPHREY 494* 535* 445*       Medications:  Current Outpatient Medications   Medication Sig     acetaminophen (TYLENOL) 500 MG tablet Take 500-1,000 mg by mouth every 6 hours as needed for mild pain     amLODIPine (NORVASC) 5 MG tablet Take 1 tablet (5 mg) by mouth daily     amphetamine-dextroamphetamine (ADDERALL) 20 MG tablet Take 20 mg by mouth daily     atorvastatin (LIPITOR) 20 MG tablet Take 20 mg by mouth every evening 1700     blood glucose monitoring (NOVA SUREFLEX) lancets 3x/day with blood glucose testing     bumetanide (BUMEX) 1 MG tablet Take 1 tablet (1 mg) by mouth 2 times daily     calcitRIOL (ROCALTROL) 0.25 MCG capsule Take 1 capsule (0.25 mcg) by mouth daily     calcium acetate, Phos Binder, 667 MG TABS Take 667 mg by mouth 3 times daily (with meals)     cetirizine (ZYRTEC) 5 MG tablet Take 5 mg by mouth daily     doxazosin (CARDURA) 2 MG tablet Take 4 mg by mouth At Bedtime     esomeprazole (NEXIUM) 20 MG DR capsule Take 1 capsule (20 mg) by mouth every morning (before breakfast) Take 30-60 minutes before eating.     ferrous sulfate (FEROSUL) 325 (65 Fe) MG tablet Take 1 tablet (325 mg) by mouth 2 times daily      fluticasone (FLONASE) 50 MCG/ACT nasal spray Spray 1 spray into both nostrils 2 times daily as needed      gabapentin (NEURONTIN) 300 MG capsule Take 600 mg by mouth At Bedtime     hydrocortisone (CORTAID) 1 % external cream Apply topically as needed     insulin aspart (NOVOLOG PEN) 100 UNIT/ML injection Inject subcutaneous 3 times daily with meals and bedtime Medium Scale less than 70 .................. Refer to Hypoglycemia Treatment Protocol 70 - 149 ......................... No corrective insulin 150 - 199 ....................... 2 units 200 - 249 ....................... 4 units 250 - 299 ....................... 6 units 300 - 349 ....................... 8 units 350 or greater ................ 10 units and notify MD       insulin detemir (LEVEMIR FLEXPEN/FLEXTOUCH) 100 UNIT/ML pen Inject 20 Units Subcutaneous 2 times daily Dose was originally 50 units BID but pt has been cutting back     insulin pen needle (B-D U/F) 31G X 8 MM USE AS DIRECTED     lidocaine (LIDODERM) 5 % patch Place 1 patch onto the skin every 24 hours     metoprolol tartrate (LOPRESSOR) 100 MG tablet Take 1 tablet (100 mg) by mouth 2 times daily     minoxidil (LONITEN) 2.5 MG tablet Take 2.5 mg by mouth daily Take for 7 days and then discontinue on 7/17/19     Multiple Vitamin (MULTI-VITAMINS) TABS Take 1 tablet by mouth daily      order for DME 4 wheeled walker with brakes and a seat.     sodium bicarbonate 650 MG tablet Take 2 tablets (1,300 mg) by mouth 2 times daily     traZODone (DESYREL) 50 MG tablet Take 25 mg by mouth At Bedtime     No current facility-administered medications for this visit.         Tiny Duff, JENISE

## 2019-07-09 NOTE — PROGRESS NOTES
Per Sheryl:    Thierno please call patient's [facility]: [phone]   1. Patient needs to arrive to clinic visits with his medication list and an order sheet for each visit   2. Please give the nurse the following orders:        Phoslo 667 mg one po TID w/meals        Decrease Minoxidil to 2.5 mg every day x 7 dys, then discontinue        Increase Lopressor to 100 mg bid        Decrease Kayexalate to MWF once per day on those days   3. Thierno please refer to [facility] surgery for access consult   4. Please refer to anemia management for DANILO.         Attempted to reach a nurse at patient's assisted living facility. I spoke with a CNA who will leave my name and number for the nurse to call me when she's available.    Anemia referral and vascular access referral completed.     Thierno Mace, RN, BAN  Nephrology RN Care Coordinator     Phone: [phone]

## 2019-07-09 NOTE — LETTER
7/9/2019      RE: Austin Walker  [address] [facility] [id number]       Nephrology Clinic Visit 7/9/19    Assessment and Plan:       1. Medication compliance - Patient had significant h/o non compliance prior to his recent move into assisting living. Unfortunately this resulted in very poor diet, poorly controlled DM and poorly controlled HTN. Since being at [facility] his glycemic control has improved, his blood pressure well controlled, edema has improved and albumin has risen. His mobility has improved, but cognition remains fluctuant.    -  He has kept his PCP, Dr Celso Esparza - [place] phone [phone], fax [phone].     - I updated him by phone today. Given that we are currently his primary Nephrology team, recommended that we would refer to our [facility] Surgery team for access and that I would decrease the Kayexalate to MWF.             2. CKD5 w/proteinuria - Creat 4.4, eGFR 12 ml/mn. Renal function has steadily declined due to poorly controlled DM/HTN. He has poor muscle mass so his renal function is likely worse than creat suggests   - UPCR 5.6 g/gCr 12/18 in the setting of very poorly controlled DM.    - Etiology of CKD is bx proven DM/HTN related CKD   - I am continuing my discussion with patient regarding his advanced renal failure. He asked about kidney transplant at our last visit and was able to remember that he was told it would take 5 yrs for DDKT. However today he was shocked he wasn't already on the list despite my reminding him that he had been de listed on 1/24/18 due to incomplete w/u and failure to respond to calls/messages by the transplant team.     - We have also been discussing dialysis. He is approaching need to begin HD given recurrent hyperkalemia/volume control issues. We have also discussed no dialysis and death from renal failure as an option if he does not want HD, but he appears unable to process how severe his health is.    - He is agreeable to [facility] Surgery consult for  access placement. If AVF is recommended would proceed with placement.       - Does not use NSAID   - Blood pressure now at goal < 130/80   - Glycemic control is improved. A1c 8.6%      3. Volume status - Ongoing hypervolemia. Denies dyspnea. Albumin 2.7. No weight today. B/P 126-130/. On Bumex 1 mg bid   - Continue Bumex at current dose   - Tried to restrict his diet at [facility], but they report that patient has control of his own diet in the section of the [facility] that he resides.        4. HTN - Controlled. Clinic blood pressures today; 126-130/. . He has improved but ongoing edema.    Current prescribed regimen:    Doxazosin 4 mg HS   Bumex 1 mg bid   Lopressor 75 mg bid/   Minoxidil 5 mg every day   Amlodipine 5 mg every day      - Decrease Minoxidil to 2.5 mg every day x 7 days then discontinue     - Increase Lopressor to 100 mg bid     - Goal is to simplify his regimen. Have plenty of room to increase CCB, BB and Doxazosin if needed      5. Electrolytes - K 3.7.    - Had recent admission for hyperkalemia and was started on Kayexalate bid by PCP   - Decrease kayexalate to MWF   - Uncontrolled hyperkalemia is an indication to begin HD   - He is non compliant with his diet      6. Acid base - No acute concerns. Bicarb 24.   - Continue Bicarb 1950 mg bid.        7. BMD - Ca  8.6, Phos 5.2, albumin 2.7   - Vit D 22 (4/19)   -  (much improved!) ( 4/19)    - Continue Calcitriol 0.25 mcq every day   - Start Phoslo 667 mg TID w/meals      8. Anemia - Hgb 8.5    - Not currently receiving DANILO, but had been through his Oncologist. Spoke with Dr Deal 5/8/19 and OK to begin DANILO.     - On iron bid   -Iron studies 7/19: Ferritin 494, IS 30, Fe 54   - Will refer to Anemia management to begin DANILO      9. MGUS - Follows with [facility], Dr Nicholas Deal.     10. DM2 - Improved control now that he is getting his insulin regularly and meals are more structured.    -  A1c improved to 8.6    11. Cognitive impairment?  - Unclear if patient is cognitively impaired or just in denial. He continues to be his own legal decision maker.     12. Disposition - RTC 1 month for f/u. Nursing contacting [facility] with medication changes      Reason for Visit:  CKD5/HTN f/u      HPI:  Mr Walker is a 74 yo male with CKD5, HTN, DM2, MGUS, previous CVA, chronic noncompliance in today for routine CKD f/u. Last seen by me on 19. Baseline creat had been in the upper 2-3 range since , but now moving into the 4 range with progression of his disease. He is nearing need for HD.         Interval Hx:   Brief hospital admission end of  for hyperkalemia. Primary provider started patient on bid Kayexalate since 19 and referred him to vascular surgery for access placement.       ROS:  Overall patient doing very well since he move to [facility]. He is stronger, now walking more. He is getting his medications regularly so his blood pressure is now controlled, his blood sugars controlled, edema much improved. His cognition remains questionable   I was able to update patient's med list with [facility] medication list  At the last visit patient was articulate and oriented to the point of questioning his transplant status. Today he can't even tell me what the outcome would be for him if he did not have dialysis despite me clearly telling him several times today  However, he was able to recount a recent hospital admission and knew it was for hyperkalemia. He knew where he was and for how long.   Denies dyspnea, CP, abdominal pain. Appetite is good. Doesn't think he is following a renal diet, Denies diarrhea despite being on Kayexalate BID.       Chronic Health Problems:      CKD5  DM2  H/O CVA  HTN  MGUS  Anemia  PRINCE on CPAP  Severe Urinary frequency/incontinence  HLD  Cognitive impairment      Personal Hx:   Single, Wife  several years ago. Currently living at [facility] (ph [phone]). Does not drive, NS, ETOH none, uses  "walker     Allergies:  Allergies   Allergen Reactions     Lisinopril      Other reaction(s): Renal Failure     Armodafinil Rash     Vitals:  /88   Pulse 111   Ht 1.803 m (5' 11\")   SpO2 94%   BMI 31.38 kg/m      Repeat blood pressure 126/80    Exam:  GEN: Elderly male in NAD. Pleasant, in W/C.   CARDIAC: RRR  LUNGS: CTA  ABDOMEN: Soft, NT  EXT:  2+ edema  NEURO: Alert and interactive. Having difficulty articulating his health status.     LABS:   CMP  Recent Labs   Lab Test 07/09/19  1123 05/08/19  1101 04/10/19  1203 02/22/19  1202    140 140 143   POTASSIUM 3.7 5.6* 5.2 5.5*   CHLORIDE 109 113* 114* 114*   CO2 24 20 20 19*   ANIONGAP 9 8 6 11   * 101* 104* 193*   BUN 51* 50* 44* 54*   CR 4.49* 3.78* 3.85* 3.25*   GFRESTIMATED 12* 15* 15* 18*   GFRESTBLACK 14* 17* 17* 21*   NAZIA 8.0* 9.2 8.9 7.8*     Recent Labs   Lab Test 04/04/18  1338 12/20/17  1130 08/02/16  1305 08/02/16 12/11/15  1016 09/14/15  0719   BILITOTAL 0.4 0.5  --   --  0.4 0.9   ALKPHOS 186* 218*  --  141* 207* 168*   ALT 16 18  --  14 36 24   AST 24 24 18  --  29 30     CBC  Recent Labs   Lab Test 07/09/19  1123 04/10/19  1203 02/22/19  1202 12/19/18  1254 11/21/18  1501   HGB 7.5* 7.3* 8.4* 10.7* 10.9*   WBC  --  4.2 5.7 3.9* 4.1   RBC  --  2.30* 2.55* 3.37* 3.52*   HCT  --  22.9* 26.1* 32.0* 34.0*   MCV  --  100 102* 95 97   MCH  --  31.7 32.9 31.8 31.0   MCHC  --  31.9 32.2 33.4 32.1   RDW  --  14.6 15.2* 14.7 13.7   PLT  --  180 126* 213 202     URINE STUDIES  Recent Labs   Lab Test 09/12/18 12/11/15  1038 09/14/15  0958 03/02/15  1407 02/11/15  1045   COLOR Yellow Light Yellow Light Yellow Yellow Light Yellow   APPEARANCE Clear Clear Clear Clear Clear   URINEGLC 250  300* Negative Negative Negative   URINEBILI Neg Negative Negative Negative Negative   URINEKETONE Neg Negative Negative Negative Negative   SG 1.025 1.007 1.010 1.012 1.010   UBLD Small Negative Trace* Small* Negative   URINEPH 5.5 5.5 6.0 6.0 6.0   PROTEIN " 300  30* 300* 300* 100*   UROBILINOGEN 0.2  --   --   --   --    NITRITE Neg Negative Negative Negative Negative   LEUKEST Neg Negative Negative Negative Negative   RBCU  --  1 1 4* 3*   WBCU  --  <1 2 <1 1     Recent Labs   Lab Test 12/19/18  1423 11/21/18  1513 10/15/18  1130 09/25/18  1357   UTPG 5.68* 4.91* 2.13* 2.74*     PTH  Recent Labs   Lab Test 04/10/19  1203 10/15/18  1117 09/25/18  1320   PTHI 332* 868* 559*     IRON STUDIES  Recent Labs   Lab Test 07/09/19  1123 04/10/19  1203 02/22/19  1202   IRON 54 69 75   * 192* 203*   IRONSAT 30 36 37   HUMPHREY 494* 535* 445*       Medications:  Current Outpatient Medications   Medication Sig     acetaminophen (TYLENOL) 500 MG tablet Take 500-1,000 mg by mouth every 6 hours as needed for mild pain     amLODIPine (NORVASC) 5 MG tablet Take 1 tablet (5 mg) by mouth daily     amphetamine-dextroamphetamine (ADDERALL) 20 MG tablet Take 20 mg by mouth daily     atorvastatin (LIPITOR) 20 MG tablet Take 20 mg by mouth every evening 1700     blood glucose monitoring (NOVA SUREFLEX) lancets 3x/day with blood glucose testing     bumetanide (BUMEX) 1 MG tablet Take 1 tablet (1 mg) by mouth 2 times daily     calcitRIOL (ROCALTROL) 0.25 MCG capsule Take 1 capsule (0.25 mcg) by mouth daily     calcium acetate, Phos Binder, 667 MG TABS Take 667 mg by mouth 3 times daily (with meals)     cetirizine (ZYRTEC) 5 MG tablet Take 5 mg by mouth daily     doxazosin (CARDURA) 2 MG tablet Take 4 mg by mouth At Bedtime     esomeprazole (NEXIUM) 20 MG DR capsule Take 1 capsule (20 mg) by mouth every morning (before breakfast) Take 30-60 minutes before eating.     ferrous sulfate (FEROSUL) 325 (65 Fe) MG tablet Take 1 tablet (325 mg) by mouth 2 times daily     fluticasone (FLONASE) 50 MCG/ACT nasal spray Spray 1 spray into both nostrils 2 times daily as needed      gabapentin (NEURONTIN) 300 MG capsule Take 600 mg by mouth At Bedtime     hydrocortisone (CORTAID) 1 % external cream Apply  topically as needed     insulin aspart (NOVOLOG PEN) 100 UNIT/ML injection Inject subcutaneous 3 times daily with meals and bedtime Medium Scale less than 70 .................. Refer to Hypoglycemia Treatment Protocol 70 - 149 ......................... No corrective insulin 150 - 199 ....................... 2 units 200 - 249 ....................... 4 units 250 - 299 ....................... 6 units 300 - 349 ....................... 8 units 350 or greater ................ 10 units and notify MD       insulin detemir (LEVEMIR FLEXPEN/FLEXTOUCH) 100 UNIT/ML pen Inject 20 Units Subcutaneous 2 times daily Dose was originally 50 units BID but pt has been cutting back     insulin pen needle (B-D U/F) 31G X 8 MM USE AS DIRECTED     lidocaine (LIDODERM) 5 % patch Place 1 patch onto the skin every 24 hours     metoprolol tartrate (LOPRESSOR) 100 MG tablet Take 1 tablet (100 mg) by mouth 2 times daily     minoxidil (LONITEN) 2.5 MG tablet Take 2.5 mg by mouth daily Take for 7 days and then discontinue on 7/17/19     Multiple Vitamin (MULTI-VITAMINS) TABS Take 1 tablet by mouth daily      order for DME 4 wheeled walker with brakes and a seat.     sodium bicarbonate 650 MG tablet Take 2 tablets (1,300 mg) by mouth 2 times daily     traZODone (DESYREL) 50 MG tablet Take 25 mg by mouth At Bedtime     No current facility-administered medications for this visit.         Tiny Duff, NP

## 2019-07-10 DIAGNOSIS — Z01.818 ENCOUNTER FOR OTHER PREPROCEDURAL EXAMINATION: ICD-10-CM

## 2019-07-10 DIAGNOSIS — N18.5 CHRONIC KIDNEY DISEASE, STAGE 5, KIDNEY FAILURE (H): Primary | ICD-10-CM

## 2019-07-10 DIAGNOSIS — Z45.2 ENCOUNTER FOR ADJUSTMENT AND MANAGEMENT OF VASCULAR ACCESS DEVICE: ICD-10-CM

## 2019-07-10 RX ORDER — SODIUM POLYSTYRENE SULFONATE 15 G/60ML
SUSPENSION ORAL; RECTAL
Qty: 120 ML | Refills: 0
Start: 2019-07-10

## 2019-07-10 NOTE — PROGRESS NOTES
Received a VM from Stephany at patient's assisted living. She requested I fax the new orders, which I have now done ([phone]).     I also called back and left Stephany a message asking that she call me back to discuss the orders in case anything needs clarification.    Thierno Mace, RN, BAN  Nephrology RN Care Coordinator     Phone: [phone]

## 2019-07-10 NOTE — PROGRESS NOTES
Nephrology Clinic Visit 7/9/19    Assessment and Plan:       1. Medication compliance - Patient had significant h/o non compliance prior to his recent move into assisting living. Unfortunately this resulted in very poor diet, poorly controlled DM and poorly controlled HTN. Since being at [facility] his glycemic control has improved, his blood pressure well controlled, edema has improved and albumin has risen. His mobility has improved, but cognition remains fluctuant.    - He has kept his PCP, Dr Celso Esparza - [place] phone [phone], fax [phone].     - I updated him by phone today. Given that we are currently his primary Nephrology team, recommended that we would refer to our [facility] Surgery team for access and that I would decrease the Kayexalate to MWF.             2. CKD5 w/proteinuria - Creat 4.4, eGFR 12 ml/mn. Renal function has steadily declined due to poorly controlled DM/HTN. He has poor muscle mass so his renal function is likely worse than creat suggests   - UPCR 5.6 g/gCr 12/18 in the setting of very poorly controlled DM.    - Etiology of CKD is bx proven DM/HTN related CKD   - I am continuing my discussion with patient regarding his advanced renal failure. He asked about kidney transplant at our last visit and was able to remember that he was told it would take 5 yrs for DDKT. However today he was shocked he wasn't already on the list despite my reminding him that he had been de listed on 1/24/18 due to incomplete w/u and failure to respond to calls/messages by the transplant team.     - We have also been discussing dialysis. He is approaching need to begin HD given recurrent hyperkalemia/volume control issues. We have also discussed no dialysis and death from renal failure as an option if he does not want HD, but he appears unable to process how severe his health is.    - He is agreeable to [facility] Surgery consult for access placement. If AVF is recommended would proceed with placement.       - Does not  use NSAID   - Blood pressure now at goal < 130/80   - Glycemic control is improved. A1c 8.6%      3. Volume status - Ongoing hypervolemia. Denies dyspnea. Albumin 2.7. No weight today. B/P 126-130/. On Bumex 1 mg bid   - Continue Bumex at current dose   - Tried to restrict his diet at [facility], but they report that patient has control of his own diet in the section of the [facility] that he resides.        4. HTN - Controlled. Clinic blood pressures today; 126-130/. . He has improved but ongoing edema.    Current prescribed regimen:    Doxazosin 4 mg HS   Bumex 1 mg bid   Lopressor 75 mg bid/   Minoxidil 5 mg every day   Amlodipine 5 mg every day      - Decrease Minoxidil to 2.5 mg every day x 7 days then discontinue     - Increase Lopressor to 100 mg bid     - Goal is to simplify his regimen. Have plenty of room to increase CCB, BB and Doxazosin if needed      5. Electrolytes - K 3.7.    - Had recent admission for hyperkalemia and was started on Kayexalate bid by PCP   - Decrease kayexalate to F   - Uncontrolled hyperkalemia is an indication to begin HD   - He is non compliant with his diet      6. Acid base - No acute concerns. Bicarb 24.   - Continue Bicarb 1950 mg bid.        7. BMD - Ca 8.6, Phos 5.2, albumin 2.7   - Vit D 22 (4/19)   -  (much improved!) ( 4/19)    - Continue Calcitriol 0.25 mcq every day   - Start Phoslo 667 mg TID w/meals      8. Anemia - Hgb 8.5    - Not currently receiving DANILO, but had been through his Oncologist. Spoke with Dr Deal 5/8/19 and OK to begin DANILO.     - On iron bid   -Iron studies 7/19: Ferritin 494, IS 30, Fe 54   - Will refer to Anemia management to begin DANILO      9. MGUS - Follows with [facility], Dr Nicholas Deal.     10. DM2 - Improved control now that he is getting his insulin regularly and meals are more structured.    -  A1c improved to 8.6    11. Cognitive impairment? - Unclear if patient is cognitively impaired or just in denial. He continues to be his  own legal decision maker.     12. Disposition - RTC 1 month for f/u. Nursing contacting [facility] with medication changes      Reason for Visit:  CKD5/HTN f/u      HPI:  Mr Walker is a 72 yo male with CKD5, HTN, DM2, MGUS, previous CVA, chronic noncompliance in today for routine CKD f/u. Last seen by me on 19. Baseline creat had been in the upper 2-3 range since , but now moving into the 4 range with progression of his disease. He is nearing need for HD.         Interval Hx:   Brief hospital admission end of  for hyperkalemia. Primary provider started patient on bid Kayexalate since 19 and referred him to vascular surgery for access placement.       ROS:  Overall patient doing very well since he move to [facility]. He is stronger, now walking more. He is getting his medications regularly so his blood pressure is now controlled, his blood sugars controlled, edema much improved. His cognition remains questionable   I was able to update patient's med list with [facility] medication list  At the last visit patient was articulate and oriented to the point of questioning his transplant status. Today he can't even tell me what the outcome would be for him if he did not have dialysis despite me clearly telling him several times today  However, he was able to recount a recent hospital admission and knew it was for hyperkalemia. He knew where he was and for how long.   Denies dyspnea, CP, abdominal pain. Appetite is good. Doesn't think he is following a renal diet, Denies diarrhea despite being on Kayexalate BID.       Chronic Health Problems:      CKD5  DM2  H/O CVA  HTN  MGUS  Anemia  PRINCE on CPAP  Severe Urinary frequency/incontinence  HLD  Cognitive impairment      Personal Hx:   Single, Wife  several years ago. Currently living at [facility] (ph [phone]). Does not drive, NS, ETOH none, uses walker     Allergies:  Allergies   Allergen Reactions     Lisinopril      Other reaction(s):  "Renal Failure     Armodafinil Rash     Vitals:  /88   Pulse 111   Ht 1.803 m (5' 11\")   SpO2 94%   BMI 31.38 kg/m     Repeat blood pressure 126/80    Exam:  GEN: Elderly male in NAD. Pleasant, in W/C.   CARDIAC: RRR  LUNGS: CTA  ABDOMEN: Soft, NT  EXT: 2+ edema  NEURO: Alert and interactive. Having difficulty articulating his health status.     LABS:   CMP  Recent Labs   Lab Test 07/09/19  1123 05/08/19  1101 04/10/19  1203 02/22/19  1202    140 140 143   POTASSIUM 3.7 5.6* 5.2 5.5*   CHLORIDE 109 113* 114* 114*   CO2 24 20 20 19*   ANIONGAP 9 8 6 11   * 101* 104* 193*   BUN 51* 50* 44* 54*   CR 4.49* 3.78* 3.85* 3.25*   GFRESTIMATED 12* 15* 15* 18*   GFRESTBLACK 14* 17* 17* 21*   NAZIA 8.0* 9.2 8.9 7.8*     Recent Labs   Lab Test 04/04/18  1338 12/20/17  1130 08/02/16  1305 08/02/16 12/11/15  1016 09/14/15  0719   BILITOTAL 0.4 0.5  --   --  0.4 0.9   ALKPHOS 186* 218*  --  141* 207* 168*   ALT 16 18  --  14 36 24   AST 24 24 18  --  29 30     CBC  Recent Labs   Lab Test 07/09/19  1123 04/10/19  1203 02/22/19  1202 12/19/18  1254 11/21/18  1501   HGB 7.5* 7.3* 8.4* 10.7* 10.9*   WBC  --  4.2 5.7 3.9* 4.1   RBC  --  2.30* 2.55* 3.37* 3.52*   HCT  --  22.9* 26.1* 32.0* 34.0*   MCV  --  100 102* 95 97   MCH  --  31.7 32.9 31.8 31.0   MCHC  --  31.9 32.2 33.4 32.1   RDW  --  14.6 15.2* 14.7 13.7   PLT  --  180 126* 213 202     URINE STUDIES  Recent Labs   Lab Test 09/12/18 12/11/15  1038 09/14/15  0958 03/02/15  1407 02/11/15  1045   COLOR Yellow Light Yellow Light Yellow Yellow Light Yellow   APPEARANCE Clear Clear Clear Clear Clear   URINEGLC 250  300* Negative Negative Negative   URINEBILI Neg Negative Negative Negative Negative   URINEKETONE Neg Negative Negative Negative Negative   SG 1.025 1.007 1.010 1.012 1.010   UBLD Small Negative Trace* Small* Negative   URINEPH 5.5 5.5 6.0 6.0 6.0   PROTEIN 300  30* 300* 300* 100*   UROBILINOGEN 0.2  --   --   --   --    NITRITE Neg Negative Negative " Negative Negative   LEUKEST Neg Negative Negative Negative Negative   RBCU  --  1 1 4* 3*   WBCU  --  <1 2 <1 1     Recent Labs   Lab Test 12/19/18  1423 11/21/18  1513 10/15/18  1130 09/25/18  1357   UTPG 5.68* 4.91* 2.13* 2.74*     PTH  Recent Labs   Lab Test 04/10/19  1203 10/15/18  1117 09/25/18  1320   PTHI 332* 868* 559*     IRON STUDIES  Recent Labs   Lab Test 07/09/19  1123 04/10/19  1203 02/22/19  1202   IRON 54 69 75   * 192* 203*   IRONSAT 30 36 37   HUMPHREY 494* 535* 445*       Medications:  Current Outpatient Medications   Medication Sig     acetaminophen (TYLENOL) 500 MG tablet Take 500-1,000 mg by mouth every 6 hours as needed for mild pain     amLODIPine (NORVASC) 5 MG tablet Take 1 tablet (5 mg) by mouth daily     amphetamine-dextroamphetamine (ADDERALL) 20 MG tablet Take 20 mg by mouth daily     atorvastatin (LIPITOR) 20 MG tablet Take 20 mg by mouth every evening 1700     blood glucose monitoring (NOVA SUREFLEX) lancets 3x/day with blood glucose testing     bumetanide (BUMEX) 1 MG tablet Take 1 tablet (1 mg) by mouth 2 times daily     calcitRIOL (ROCALTROL) 0.25 MCG capsule Take 1 capsule (0.25 mcg) by mouth daily     calcium acetate, Phos Binder, 667 MG TABS Take 667 mg by mouth 3 times daily (with meals)     cetirizine (ZYRTEC) 5 MG tablet Take 5 mg by mouth daily     doxazosin (CARDURA) 2 MG tablet Take 4 mg by mouth At Bedtime     esomeprazole (NEXIUM) 20 MG DR capsule Take 1 capsule (20 mg) by mouth every morning (before breakfast) Take 30-60 minutes before eating.     ferrous sulfate (FEROSUL) 325 (65 Fe) MG tablet Take 1 tablet (325 mg) by mouth 2 times daily     fluticasone (FLONASE) 50 MCG/ACT nasal spray Spray 1 spray into both nostrils 2 times daily as needed      gabapentin (NEURONTIN) 300 MG capsule Take 600 mg by mouth At Bedtime     hydrocortisone (CORTAID) 1 % external cream Apply topically as needed     insulin aspart (NOVOLOG PEN) 100 UNIT/ML injection Inject subcutaneous 3  times daily with meals and bedtime Medium Scale less than 70 .................. Refer to Hypoglycemia Treatment Protocol 70 - 149 ......................... No corrective insulin 150 - 199 ....................... 2 units 200 - 249 ....................... 4 units 250 - 299 ....................... 6 units 300 - 349 ....................... 8 units 350 or greater ................ 10 units and notify MD       insulin detemir (LEVEMIR FLEXPEN/FLEXTOUCH) 100 UNIT/ML pen Inject 20 Units Subcutaneous 2 times daily Dose was originally 50 units BID but pt has been cutting back     insulin pen needle (B-D U/F) 31G X 8 MM USE AS DIRECTED     lidocaine (LIDODERM) 5 % patch Place 1 patch onto the skin every 24 hours     metoprolol tartrate (LOPRESSOR) 100 MG tablet Take 1 tablet (100 mg) by mouth 2 times daily     minoxidil (LONITEN) 2.5 MG tablet Take 2.5 mg by mouth daily Take for 7 days and then discontinue on 7/17/19     Multiple Vitamin (MULTI-VITAMINS) TABS Take 1 tablet by mouth daily      order for DME 4 wheeled walker with brakes and a seat.     sodium bicarbonate 650 MG tablet Take 2 tablets (1,300 mg) by mouth 2 times daily     traZODone (DESYREL) 50 MG tablet Take 25 mg by mouth At Bedtime     No current facility-administered medications for this visit.         Tiny Duff, EJNISE

## 2019-07-15 ENCOUNTER — TELEPHONE (OUTPATIENT)
Dept: PHARMACY | Facility: CLINIC | Age: 74
End: 2019-07-15

## 2019-07-15 DIAGNOSIS — D63.1 ANEMIA OF CHRONIC RENAL FAILURE, STAGE 5 (H): ICD-10-CM

## 2019-07-15 DIAGNOSIS — N18.5 ANEMIA OF CHRONIC RENAL FAILURE, STAGE 5 (H): ICD-10-CM

## 2019-07-15 DIAGNOSIS — N18.5 CKD (CHRONIC KIDNEY DISEASE) STAGE 5, GFR LESS THAN 15 ML/MIN (H): Primary | ICD-10-CM

## 2019-07-15 NOTE — TELEPHONE ENCOUNTER
Anemia Management Note - Enrollment  SUBJECTIVE/OBJECTIVE:    Referred by Sheryl Duff NP on 2019  Primary Diagnosis: Anemia in Chronic Kidney Disease (N18.5, D63.1)     Secondary Diagnosis:  Chronic Kidney Disease, Stage 5 (N18.5)  Hgb goal range:  9-10  Epo/Darbo: Aranesp  60 mcg  every two weeks for Hgb <10. In clinic  Iron regimen:  Ferrous Sulfate  once daily  Labs : 2020  Recent DANILO use, transfusion, IV iron: NA  RX/TX plans : 2020  No MI and blood clots or cancers. Hx of CVA    Contact:  No Consent to Communicate on File    Assisted Living  # [phone]  Fax # [phone]  Patients Ph # [phone]    7/15/19; Spoke with Stephany WILSON at [facility], Ganesh does not participate in the lab draw at the facility, he goes out to his clinic.  He is not considered Homebound and is very independent. He would need to receive the Aranesp at clinic.       Anemia Latest Ref Rng & Units 2018 10/15/2018 2018 2018 2019 4/10/2019 2019   Hemoglobin 13.3 - 17.7 g/dL 10.3(L) 11.5(L) 10.9(L) 10.7(L) 8.4(L) 7.3(L) 7.5(L)   TSAT 15 - 46 % 36 44 - - 37 36 30   Ferritin 26 - 388 ng/mL 250 345 - - 445(H) 535(H) 494(H)       BP Readings from Last 3 Encounters:   19 130/88   19 133/70   04/10/19 128/56     Wt Readings from Last 2 Encounters:   19 102.1 kg (225 lb)   18 87.3 kg (192 lb 6.4 oz)     Current Outpatient Medications   Medication Sig Dispense Refill     acetaminophen (TYLENOL) 500 MG tablet Take 500-1,000 mg by mouth every 6 hours as needed for mild pain       amLODIPine (NORVASC) 5 MG tablet Take 1 tablet (5 mg) by mouth daily 90 tablet 3     amphetamine-dextroamphetamine (ADDERALL) 20 MG tablet Take 20 mg by mouth daily       atorvastatin (LIPITOR) 20 MG tablet Take 20 mg by mouth every evening 1700       blood glucose monitoring (NOVA SUREFLEX) lancets 3x/day with blood glucose testing       bumetanide (BUMEX) 1 MG tablet Take 1 tablet (1  mg) by mouth 2 times daily 180 tablet 1     calcitRIOL (ROCALTROL) 0.25 MCG capsule Take 1 capsule (0.25 mcg) by mouth daily 90 capsule 3     calcium acetate, Phos Binder, 667 MG TABS Take 667 mg by mouth 3 times daily (with meals) 180 tablet 3     cetirizine (ZYRTEC) 5 MG tablet Take 5 mg by mouth daily       doxazosin (CARDURA) 2 MG tablet Take 4 mg by mouth At Bedtime       esomeprazole (NEXIUM) 20 MG DR capsule Take 1 capsule (20 mg) by mouth every morning (before breakfast) Take 30-60 minutes before eating. 90 capsule 1     ferrous sulfate (FEROSUL) 325 (65 Fe) MG tablet Take 1 tablet (325 mg) by mouth 2 times daily 180 tablet 3     fluticasone (FLONASE) 50 MCG/ACT nasal spray Spray 1 spray into both nostrils 2 times daily as needed        gabapentin (NEURONTIN) 300 MG capsule Take 600 mg by mouth At Bedtime       hydrocortisone (CORTAID) 1 % external cream Apply topically as needed       insulin aspart (NOVOLOG PEN) 100 UNIT/ML injection Inject subcutaneous 3 times daily with meals and bedtime Medium Scale less than 70 .................. Refer to Hypoglycemia Treatment Protocol 70 - 149 ......................... No corrective insulin 150 - 199 ....................... 2 units 200 - 249 ....................... 4 units 250 - 299 ....................... 6 units 300 - 349 ....................... 8 units 350 or greater ................ 10 units and notify MD         insulin detemir (LEVEMIR FLEXPEN/FLEXTOUCH) 100 UNIT/ML pen Inject 20 Units Subcutaneous 2 times daily Dose was originally 50 units BID but pt has been cutting back 10 mL 1     insulin pen needle (B-D U/F) 31G X 8 MM USE AS DIRECTED       lidocaine (LIDODERM) 5 % patch Place 1 patch onto the skin every 24 hours       metoprolol tartrate (LOPRESSOR) 100 MG tablet Take 1 tablet (100 mg) by mouth 2 times daily 180 tablet 3     minoxidil (LONITEN) 2.5 MG tablet Take 2.5 mg by mouth daily Take for 7 days and then discontinue on 7/17/19       Multiple Vitamin  (MULTI-VITAMINS) TABS Take 1 tablet by mouth daily        order for DME 4 wheeled walker with brakes and a seat.       sodium bicarbonate 650 MG tablet Take 2 tablets (1,300 mg) by mouth 2 times daily 200 tablet 3     sodium polystyrene (KAYEXALATE) 15 GM/60ML suspension Take 30 g by mouth once daily on Mondays, Wednesdays, and Fridays only. 120 mL 0     traZODone (DESYREL) 50 MG tablet Take 25 mg by mouth At Bedtime       ASSESSMENT:  Hgb Not at goal/Initiation of therapy   Ferritin: At goal (>100ng/mL)  TSat: at goal >30%  Iron regimen recommended: Ferrous Sulfate 1 tab daily  Recommended DANILO regimen: Aranesp 60mcg every 14 days for Hgb <10  Blood Pressure: Stable    PLAN:  1. Spoke with Ganesh today for enrollment in Anemia Management Service.  2. Discussed:  anemia overview, monitoring service and goal hemoglobin range and rationale and risks of DANILO blood clots, stroke and increase in blood pressure  3. Dose location: in clinic TB  4. Labs: [place]  5. Pharmacy: NA    Spoke with Ganesh, he does NOT want to start DANILO therapy d/t side effects. Hx of CVA      Next call date:  07/29/2019  Continue to monitor labs.     Otilia Serrato RN   Anemia Services  [facility]  [address] [place] [id number]   [email or web address]   Office : [phone]  Fax: [phone]

## 2019-07-22 NOTE — TELEPHONE ENCOUNTER
RECORDS RECEIVED FROM: New dialysis vascular access consult   DATE RECEIVED: 7.24.19   NOTES STATUS DETAILS   OFFICE NOTE from referring provider Internal 7.9.19 Tiny Duff   OFFICE NOTE from other specialist Internal/Care Everywhere    OPERATIVE REPORT N/A    MEDICATION LIST Internal    PERTINENT LABS Internal/Care Everywhere    CTA (CT ANGIOGRAPHY) In process Scheduled 7.24   CT N/A    MRI N/A    ULTRASOUND Internal

## 2019-07-24 ENCOUNTER — PRE VISIT (OUTPATIENT)
Dept: VASCULAR SURGERY | Facility: CLINIC | Age: 74
End: 2019-07-24

## 2019-07-24 ENCOUNTER — TELEPHONE (OUTPATIENT)
Dept: VASCULAR SURGERY | Facility: CLINIC | Age: 74
End: 2019-07-24

## 2019-07-29 ENCOUNTER — TELEPHONE (OUTPATIENT)
Dept: NEPHROLOGY | Facility: CLINIC | Age: 74
End: 2019-07-29

## 2019-07-29 NOTE — TELEPHONE ENCOUNTER
Follow-up with anemia management service:    Admitted 7/22/2019 to [facility] with Chronic Renal Failure.     Anemia Latest Ref Rng & Units 9/25/2018 10/15/2018 11/21/2018 12/19/2018 2/22/2019 4/10/2019 7/9/2019   Hemoglobin 13.3 - 17.7 g/dL 10.3(L) 11.5(L) 10.9(L) 10.7(L) 8.4(L) 7.3(L) 7.5(L)   TSAT 15 - 46 % 36 44 - - 37 36 30   Ferritin 26 - 388 ng/mL 250 345 - - 445(H) 535(H) 494(H)         Follow-up call date: 08/05/2019        Anemia Management Service  Otilia Serrato RN  Phone: [phone]  Fax: [phone]

## 2019-08-01 ENCOUNTER — CARE COORDINATION (OUTPATIENT)
Dept: NEPHROLOGY | Facility: CLINIC | Age: 74
End: 2019-08-01

## 2019-08-01 NOTE — PROGRESS NOTES
Per chart review in care everywhere, patient is admitted at [facility]. Nephrology and palliative teams have been consulted, and patient is transitioning to comfort cares. Will not need outpatient neph follow up.  Called patient's assisted living and left a VM for patient's nurse, Abby, to call me back to confirm cancelling patient's follow up appointment on 8/7.    Thierno Mace RN

## 2019-08-05 ENCOUNTER — TELEPHONE (OUTPATIENT)
Dept: PHARMACY | Facility: CLINIC | Age: 74
End: 2019-08-05

## 2019-08-05 ENCOUNTER — TELEPHONE (OUTPATIENT)
Dept: NEPHROLOGY | Facility: CLINIC | Age: 74
End: 2019-08-05

## 2019-08-06 NOTE — PROGRESS NOTES
Reached staff at patient's [facility] and confirmed cancellation for patient's appointment with Sheryl 8/7 since patient is now on hospice.    Thierno Mace RN

## 2019-08-10 NOTE — NURSING NOTE
"Chief Complaint   Patient presents with     Follow Up For     1 month follow up symptom check       Blood pressure 138/68, pulse 69, height 1.803 m (5' 11\"), weight 91.6 kg (202 lb). Body mass index is 28.17 kg/(m^2).    Patient Active Problem List   Diagnosis     Metabolic encephalopathy     Pelvic fracture (H)     H/O atrial septal defect repair     Left ventricular hypertrophy     CKD (chronic kidney disease) stage 4, GFR 15-29 ml/min (H)     Renovascular hypertension     Diabetes mellitus (H)       Allergies   Allergen Reactions     Lisinopril      Other reaction(s): Renal Failure     Armodafinil Rash       Current Outpatient Prescriptions   Medication Sig Dispense Refill     order for DME 4 wheeled walker with brakes and a seat.       ASPIRIN EC PO Take 81 mg by mouth daily        atorvastatin (LIPITOR) 20 MG tablet Take 20 mg by mouth every evening 1700       blood glucose monitoring (NOVA SUREFLEX) lancets 3x/day with blood glucose testing       calcitRIOL (ROCALTROL) 0.25 MCG capsule Take 1 capsule (0.25 mcg) by mouth daily for 28 days 90 capsule 0     darbepoetin aaliyah-polysorbate (ARANESP, ALBUMIN FREE,) 300 MCG/ML SOLN Inject 300 mcg Subcutaneous every 14 days As needed for hgb <=11g/dL       ferrous sulfate 325 (65 FE) MG tablet Take 325 mg by mouth 2 times daily        fluticasone (FLONASE) 50 MCG/ACT nasal spray Spray 1 spray into both nostrils 2 times daily as needed        furosemide (LASIX) 20 MG tablet Take 1 tablet (20 mg) by mouth 2 times daily 30 tablet      gabapentin (NEURONTIN) 300 MG capsule Take 600 mg by mouth 2 times daily       insulin aspart (NOVOLOG PEN) 100 UNIT/ML injection Inject subcutaneous 3 times daily with meals and bedtime Medium Scale less than 70 .................. Refer to Hypoglycemia Treatment Protocol 70 - 149 ......................... No corrective insulin 150 - 199 ....................... 2 units 200 - 249 ....................... 4 units 250 - 299 " ....................... 6 units 300 - 349 ....................... 8 units 350 or greater ................ 10 units and notify MD         insulin detemir (LEVEMIR FLEXPEN/FLEXTOUCH) 100 UNIT/ML soln Inject 10 Units Subcutaneous 2 times daily Dose was originally 50 units BID but pt has been cutting back (Patient taking differently: Inject 30 Units Subcutaneous 2 times daily Dose was originally 50 units BID but pt has been cutting back)       insulin pen needle (B-D U/F) 31G X 8 MM USE AS DIRECTED       loratadine (CLARITIN) 10 MG tablet Take 10 mg by mouth daily       losartan (COZAAR) 50 MG tablet Take 1 tablet (50 mg) by mouth daily 90 tablet 3     metoprolol tartrate (LOPRESSOR) 25 MG tablet TAKE 1 TABLET(25 MG) BY MOUTH TWICE DAILY 180 tablet 3     minoxidil (LONITEN) 2.5 MG tablet Take 1 tablet (2.5 mg) by mouth daily 180 tablet 3     mirabegron (MYRBETRIQ) 25 MG 24 hr tablet Take 1 tablet (25 mg) by mouth daily 30 tablet 11     Misc Natural Products (OSTEO BI-FLEX ADV TRIPLE ST PO) Take by mouth daily       Multiple Vitamin (MULTI-VITAMINS) TABS Take 1 tablet by mouth daily        omeprazole (PRILOSEC) 20 MG capsule Take 20 mg by mouth daily        sodium bicarbonate 650 MG tablet Take 1 tablet (650 mg) by mouth 2 times daily 120 tablet 3     solifenacin (VESICARE) 5 MG tablet Take 1 tablet (5 mg) by mouth daily 30 tablet 11     UNABLE TO FIND Take by mouth daily MEDICATION NAME: Brain Peak         Social History   Substance Use Topics     Smoking status: Never Smoker     Smokeless tobacco: Never Used     Alcohol use No       Rita Forrester LPN  10/15/2018  11:48 AM     - - -

## 2019-12-27 ENCOUNTER — DOCUMENTATION ONLY (OUTPATIENT)
Dept: TRANSPLANT | Facility: CLINIC | Age: 74
End: 2019-12-27

## 2019-12-27 NOTE — PROGRESS NOTES
Reviewing delinquent files.  Patient  19.  I asked admin staff to notify Epic/[facility].  Transplant case closed/resolved.

## 2020-10-18 NOTE — PROGRESS NOTES
"UROLOGY OFFICE VISIT - FOLLOW UP    REASON FOR VISIT: follow up urinary frequency, urgency, incontinence    HPI: Mr. Austin Walker is a 73 year old male with a PMH of CKD stage 4 with proteinuria 2/2 DM and HTN (awaiting transplant listing, not currently on dialysis), MGUS (follows with [facility]), PRINCE, and h/o CVA who returns to the urology clinic for follow up of urinary frequency, urgency, and incontinence. Seen in initial consultation on 2/12/18 - please see consult note from this date for full history. In brief, he has had bothersome urinary symptoms for the past 3 years, progressively worsening. Incontinence started after he began taking Lasix (80 mg in the AM, 60 mg in the PM, ~2pm). He has multiple episodes of incontinence per day - goes through 4-6 Depends briefs per day. Leakage mainly occurs during the day, rarely overnight. Gets up 0-1 times per night to void. Will leak when he goes from supine or sitting to standing. States that urine just \"pours out.\" Also reports significant urgency and urge incontinence. No h/o UTI's or kidney stones. Negative UA through primary care office in January 2018. No hematuria, dysuria, needing to strain to void. Subjectively, force of stream is \"very strong.\" Occasional sensation of incomplete emptying but PVR at last office visit was 11 mL. Normally sits to void. Was previously drinking 1 pot of coffee per day, 2 glasses of diet ginger ale, 1 glass of OJ in the AM. Initially, he was recommended to limit bladder irritants and complete bladder diaries. At a follow up appointment on 4/9/18, he reported decreasing coffee consumption from 1 pot per day to 1 cup per day. Despite this, he had not noticed any improvement in urinary symptoms. In fact, he thought things may be getting worse with more frequent nighttime incontinence now than ever before. He was therefore started on Vesicare 5 mg daily (renally dosed) and asked to complete another bladder diary. At a follow " "up appointment, he reported \"night and day difference\" in his symptoms with less urgency and less incontinence. He did continue to have some incontinence, but it was much less volume then previously. He wondered about increasing the dose of Vesicare as the effects seem to wear off toward the end of the day. Dose increase was initially attempted but is contraindicated given CrCl <30. He therefore continued on Vesicare 5 mg once daily. At another follow up on 9/10/18, he complained that symptoms were getting worse. He was therefore started on Myrbetriq 25 mg daily and urodynamics were completed on 9/12/18 (results below). At that time, he elected to continue with dual therapy with Myrbetriq and Vesicare rather than proceeding with third line treatments (bladder Botox, Interstim, possible outlet procedure, etc.).     He returns today for follow up and reports significant improvement in his urinary symptoms. He is now getting up 1-2 times at night to void, down from 3-4. No longer having incontinence. From review of recent nephrology office visit note, Lasix was changed from 80/60 to 20 mg BID.     Bladder Diary (PRIOR TO VESICARE):  Voids q30-90 minutes during the day, nocturia x 0-1.   Episodes of incontinence: multiple per day, goes through 4-6 Depends per day.  Incontinence associated with: sitting to standing, strong urge  Volume Intake: 1 cup of coffee, 2-4 glasses of water, 1 glass of OJ  Voided Volume: 1890 mL per day; average voided volume: 150 mL; largest voided volume: 280 mL    Bladder Diary (AFTER STARTING VESICARE):  Voids q2 hours during the day, nocturia x 0-1.   Episodes of incontinence: 3 per day, goes through 4 Depends per day.  Incontinence associated with: strong urge  Volume Intake: 5 cups of coffee, 1 glass of OJ  Voided Volume: 1800 mL per day; average voided volume: 225 mL; largest voided volume: 295 mL    Urodynamics 9/12/18:  -Small bladder capacity (~270 mL) with normal filling sensations. " "  -Fair/poor bladder compliance (ratio 7.9) with quite a bit of detrusor overactivity throughout filling (max Pdet reaches ~30 cm H2O) but no incontinence (DO or stress-related).  -Strong detrusor contraction during voiding to 58.6 cm H2O with a slow flow (Qmax recorded as 1.7 mL/sec but this is artificially low as his urine stream was disrupted by the sheet underneath patient, so the flow was trickling off the sheet into the uroflow).  -There is also some incomplete bladder emptying (voided 100 mL; final  mL).   -Given these findings (high pressure/low flow), there is some evidence for possible bladder outlet obstruction which is further supported by BOOI of 48.9.  -No evidence for DSD as EMG tracings remain quiet during voiding.  -Fluoroscopy reveals a smooth-walled bladder without diverticuli or VUR. Bladder neck is closed during filling and open during voiding with possible area of narrowing in the mid-proximal urethra.    PEx  /68  Pulse 69  Ht 1.803 m (5' 11\")  Wt 91.6 kg (202 lb)  BMI 28.17 kg/m2  GEN: well-appearing male in NAD  RESP: no increased respiratory effort    PVR today: 20 mL as measured by ultrasound    PSA   Date Value Ref Range Status   12/20/2017 0.25 0 - 4 ug/L Final     Comment:     Assay Method:  Chemiluminescence using Siemens Vista analyzer       Creatinine   Date Value Ref Range Status   09/25/2018 2.82 (H) 0.66 - 1.25 mg/dL Final       A/P  73 year old male with urinary frequency, urgency, and urge incontinence. UDS on 9/12/18 showed small bladder capacity with reduced compliance and DO as well as possible evidence for bladder outlet obstruction. Currently, his symptoms are very well controlled with Vesicare 5 mg daily (renally dosed), Myrbetriq 25 mg daily, and decreasing Lasix to 20 mg BID per nephrology recommendations. No longer incontinent, nocturia down from 3-4x/night --> 1-2x/night, and he continues to empty his bladder well (PVR today 20 mL). He would like to " continue with current plan.  -Continue Vesicare 5 mg daily.  -Continue Myrbetriq 25 mg daily.   -Continue to practice timed voiding and double voiding.  -Continue to limit bladder irritants.    Follow up in 3 months to recheck, sooner with any issues.     15 minutes spent with the patient, >50% in counseling and coordination of care.    Karlene Odom PA-C  Urology       Specialty Care

## 2021-05-24 ENCOUNTER — RECORDS - HEALTHEAST (OUTPATIENT)
Dept: ADMINISTRATIVE | Facility: CLINIC | Age: 76
End: 2021-05-24

## 2021-05-28 ENCOUNTER — RECORDS - HEALTHEAST (OUTPATIENT)
Dept: ADMINISTRATIVE | Facility: CLINIC | Age: 76
End: 2021-05-28

## 2021-06-18 NOTE — LETTER
Letter by Juaquin Caldwell NP at      Author: Juaquin Caldwell NP Service: -- Author Type: --    Filed:  Encounter Date: 1/23/2019 Status: (Other)         Patient: Austin Walker   MR Number: [id number]   YOB: 1945   Date of Visit: 1/23/2019     [facility]      Facility:    [facility] [[id number]]  Code Status: POLST AVAILABLE      Chief Complaint/Reason for Visit:     Chief Complaint   Patient presents with   ? Review Of Multiple Medical Conditions       HPI:   Austin is a 73 y.o. male who is recent transfer from [facility] with an admission on 1/11/19 and discharge on 1/19/19.  He has a past medical history of hypertension, diabetes type 2, CVA, chronic kidney disease stage III, PRINCE on CPAP who presented to the ER for evaluation of fever.  He was found to be in sepsis due to left forearm cell cellulitis, negative for DVT.  He was started on vancomycin and transitioned to Keflex.  Per his chronic kidney disease admission it was 3.8 and improved to 2.6, baseline 2 to 3.  His blood sugars were somewhat elevated though he states he does not eat well, maintain on sliding scale., imaging was negative and discharged on a modified diet.  He also had issues with speech so during his hospital stay he developed significant delirium, given scheduled Zyprexa and trazodone.  He was seen by palliative care per being a vulnerable adult and was discharged to TCU for possible disposition change per being unable to care for self.    He has limited concerns today though does not like his modified diet, though continues to increase in his appetite.  His blood sugars seem quite variable and ongoing hypertension. Patient denies pain, headache, chest pain, numbness or tingling, shortest of breath, eating or swallowing concerns, nausea or vomiting, diarrhea or bowel abnormalities, or no new integumentary concerns today.      Past Medical History:  Past Medical  History:   Diagnosis Date   ? Allergic rhinitis    ? Anemia    ? Arthritis    ? Cavus deformity of foot    ? Cervical spinal stenosis    ? Chronic kidney disease    ? Closed fracture of multiple ribs of right side    ? Colon polyp    ? Diabetes mellitus (H)    ? Diabetic neuropathy (H)    ? Hyperkalemia    ? Hyperlipemia    ? Hypertension    ? Hypoglycemia    ? Metabolic acidosis    ? MGUS (monoclonal gammopathy of unknown significance)    ? Sepsis (H)    ? Sleep apnea    ? Stroke (H)            Surgical History:  Past Surgical History:   Procedure Laterality Date   ? ASD REPAIR  2001   ? CARDIAC SURGERY     ? JOINT REPLACEMENT Left 02/11/2014    knee   ? JOINT REPLACEMENT Right 2005    hip   ? SINUS SURGERY Left 03/28/2007    Left anterior ethmoidectomy and left maxillary antrostomy       Family History:   Family History   Problem Relation Age of Onset   ? Allergic rhinitis Mother    ? Anesthesia problems Mother    ? Arthritis Mother    ? Heart disease Mother    ? Hypertension Mother    ? Arthritis Father    ? Diabetes Father    ? Heart disease Father    ? Allergies Brother    ? Asthma Brother    ? Cancer Brother    ? Allergies Maternal Aunt    ? Arthritis Maternal Aunt    ? Heart disease Maternal Aunt    ? Hypertension Maternal Aunt        Social History:    Social History     Socioeconomic History   ? Marital status:      Spouse name: Not on file   ? Number of children: Not on file   ? Years of education: Not on file   ? Highest education level: Not on file   Social Needs   ? Financial resource strain: Not on file   ? Food insecurity - worry: Not on file   ? Food insecurity - inability: Not on file   ? Transportation needs - medical: Not on file   ? Transportation needs - non-medical: Not on file   Occupational History   ? Not on file   Tobacco Use   ? Smoking status: Never Smoker   ? Smokeless tobacco: Never Used   Substance and Sexual Activity   ? Alcohol use: Yes     Comment: 1 drink per week   ? Drug  use: No   ? Sexual activity: Not on file   Other Topics Concern   ? Not on file   Social History Narrative   ? Not on file          Review of Systems   Constitutional: Positive for activity change. Negative for appetite change, chills, diaphoresis and fatigue.   HENT: Negative for congestion and hearing loss.    Eyes: Negative for photophobia, redness and visual disturbance.   Respiratory: Negative for choking, shortness of breath and wheezing.    Cardiovascular: Positive for leg swelling.   Gastrointestinal: Negative for abdominal distention, abdominal pain, blood in stool, constipation and diarrhea.   Endocrine: Negative.    Genitourinary: Negative for penile pain.   Musculoskeletal:        Denies pain   Allergic/Immunologic: Negative.    Neurological: Positive for speech difficulty. Negative for headaches.   Hematological: Negative.    Psychiatric/Behavioral: Positive for confusion. Negative for agitation, hallucinations and sleep disturbance. The patient is not nervous/anxious.        Vitals:    01/23/19 1702   BP: 177/79   Pulse: 71   Resp: 16   Temp: 98  F (36.7  C)   SpO2: 93%       Physical Exam   Constitutional: No distress.   No acute issues   HENT:   Head: Normocephalic and atraumatic.   Mouth/Throat: Oropharynx is clear and moist. No oropharyngeal exudate.   Eyes: Right eye exhibits no discharge. Left eye exhibits no discharge. No scleral icterus.   Neck: No JVD present.   Limited mobility   Cardiovascular: Normal rate.   Pulmonary/Chest: No stridor. No respiratory distress. He has no wheezes. He has rales.   Faint bibasilar crackles, RA   Abdominal: Soft. Bowel sounds are normal. He exhibits no distension. There is no tenderness. There is no rebound.   Denies constipation or diarrhea   Genitourinary:   Genitourinary Comments: deferred   Musculoskeletal: He exhibits edema.   2+  BLE,  wheelchair-bound, kyphotic   Neurological: He is alert. A cranial nerve deficit is present.   Alert and oriented x2, has  limited recall, L hemiparesis   Skin: Skin is warm and dry. He is not diaphoretic. There is erythema.   Reddened left arm   Psychiatric:   Stoic, denies anxiety or depression   Vitals reviewed.      Medication List:  Current Outpatient Medications   Medication Sig   ? acetaminophen (TYLENOL) 500 MG tablet Take 1,000 mg by mouth 3 (three) times a day.   ? atorvastatin (LIPITOR) 20 MG tablet Take 20 mg by mouth daily.   ? calcitriol (ROCALTROL) 0.25 MCG capsule Take 0.25 mcg by mouth daily.   ? dextroamphetamine-amphetamine (ADDERALL) 20 mg Tab Take 20 mg by mouth 3 (three) times a day. 0800, 1200, 1500   ? ferrous sulfate 325 (65 FE) MG tablet Take 1 tablet by mouth 2 (two) times a day with meals.    ? fluticasone (FLONASE) 50 mcg/actuation nasal spray 1 spray into each nostril 2 (two) times a day.    ? gabapentin (NEURONTIN) 300 MG capsule Take 600 mg by mouth 2 (two) times a day.          ? glucosamine/chondro schaefer A/C/Mn (GLUCOSAMINE-CHONDROITIN COMPLX ORAL) Take 1 tablet by mouth 2 (two) times a day.   ? insulin aspart U-100 (NOVOLOG) 100 unit/mL injection Inject under the skin 3 (three) times a day with meals. Per sliding scale;         ? insulin detemir U-100 (LEVEMIR) 100 unit/mL injection Inject 6 Units under the skin at bedtime.   ? lidocaine (LIDODERM) 5 % Place 1 patch on the skin daily. Remove & Discard patch within 12 hours or as directed by MD   ? loratadine (CLARITIN) 10 mg tablet Take 10 mg by mouth daily.          ? metoprolol tartrate (LOPRESSOR) 25 MG tablet Take 75 mg by mouth 2 (two) times a day.          ? minoxidil (LONITEN) 2.5 MG tablet Take 5 mg by mouth daily.   ? mirabegron (MYRBETRIQ) 25 mg Tb24 ER tablet Take 25 mg by mouth daily.   ? multivitamin therapeutic (THERAGRAN) tablet Take 1 tablet by mouth daily.   ? omeprazole (PRILOSEC) 20 MG capsule Take 20 mg by mouth daily.   ? QUEtiapine (SEROQUEL) 25 MG tablet Take 25 mg by mouth at bedtime.   ? sodium bicarbonate 650 MG tablet Take 1,300  mg by mouth 4 (four) times a day. OTC product   ? solifenacin (VESICARE) 5 MG tablet Take 10 mg by mouth daily.       Labs:  Na 141  K 5.0  BUN 50  Cr 2.68  GFR 24  Hgb 9.3  MCV 96  Plt 285  Alb 2.7  Vit B12 891  Ferritin 1500  ALT 30  AST 36  A1c 9.1    Assessment/Plan:    Left upper extremity cellulitis: Given Keflex, and Abx in the hospital, monitor    Anemia of chronic disease: Continue ferrous sulfate 325 mg twice daily, last ferritin 1500+, last Hgb 9.3, possibly f/u with nephrology per erythropoietin    Neuropathy: Continue gabapentin 600 mg twice daily    Pain control: Continue Tylenol 1000 mg 3 times daily and lidocaine pain, monitor    Chronic kidney disease stage III: Continue sodium bicarbonate 1300 mg 4 times daily, calcitriol 0.5 mcg daily, taken off Lasix in hospital per renal function, last CR 2.64 with GFR 24, recently put on 2L fluid restriction    Delirium: was on zypreza and trazodone in hospital, which has been discontinued, continue quetiapine 25 mg at bedtime    Dysphasia:  continues with mechanical soft with nectar thick, speech following    GERD: Continue omeprazole 20 mg daily and mirabegron 25mg daily    HTN: Continue metoprolol 75 mg twice daily and minoxidil 5mg daily, monitor blood pressure twice daily times 5 days, plan to start hydralazine if needed    Diabetes type 2: Continue Levemir 6 units at bedtime and sliding scale, last A1c 9.1    Attention deficit disorder: Continue Adderall 20 mg 3 times daily    Disposition: Is a vulnerable adult, probably will need alternative placement    The care plan has been reviewed and all orders signed. Changes to care plan, if any, as noted. Otherwise, continue care plan of care.  The total time spent with this patient was 45 minutes, with greater than 50% spent in counseling and coordination of care that included multiple issues per review of care.    Electronically signed by: Juaquin Caldwell NP

## 2021-06-18 NOTE — LETTER
Letter by Yvette Jack MBBS at      Author: Yvette Jack MBBS Service: -- Author Type: --    Filed:  Encounter Date: 1/21/2019 Status: (Other)         Patient: Austin Walker   MR Number: [id number]   YOB: 1945   Date of Visit: 1/21/2019       [facility] Admission note      Patient: Austin Walker  MRN: [id number]  Date of Service: 1/21/2019      [facility] [[id number]]  Reason for Visit     Chief Complaint   Patient presents with   ? H & P       Code Status     FULL CODE    Assessment   Left upper extremity cellulitis  Sepsis  Altered mental status  Vulnerable adult status  GUZMAN in the setting of chronic kidney disease with a baseline creatinine ranging from 2-3  Hyponatremia  MGUS with repeat biopsy of the kidney in the hospital did not reveal any MGUS related disease  Diabetes type 2 on insulin at home poorly controlled  Hypertension  Chronic anemia of chronic disease  PRINCE  History of CVA in 2001.  Has significant word finding difficulty  Profound deconditioning the patient declining at home refusing outside help  Left-sided chest wall pain and rib pain  Chronic pain of the back including cervical and lumbar spine  Delirium in the hospital  History of diabetic polyneuropathy  Plan     Patient was medically stabilized  He was treated for his cellulitis with antibiotics.  Due to worsening renal function he has been taken off diuretics with advice for close monitoring.  Repeat MRI of the brain was done due to increased slurring of speech but was negative  Overall diabetic control was optimized  He was put on a modified diet and is very upset due to which is not eating well as per him so we will defer further diabetic management until his diet is upgraded at his request.  Started on insulin sliding scale based on blood sugars  He has had elevated A1c in the past but his blood sugars are quite brittle in the hospital.  To monitor trends before adjusting  Due to  altered mental status he remains on Seroquel  Other medication that he was given in the hospital including Zyprexa and trazodone have been discontinued continue to monitor mood and behaviors closely  It seems that he is been declining at home and refusing outside support care conference done with the family and it is recommended that he should look for either help or placement social work to assist him in the meantime to be in the TCU for strengthening  Discontinue glucosamine/chondroitin due to polypharmacy concerns and no proven benefit  Monitor his fluid status closely as he has been taken off Lasix in the hospital.  We will put him on 2 L of fluid restriction and monitor weights closely  Monitor kidney functions closely before putting him back on any diuretics.  He remains on iron supplementation due to malabsorption concerns even though he has anemia of chronic disease.  He needs to see nephrology to be considered for erythropoietin.  He has elevated blood pressures primarily systolic and remains on metoprolol and minoxidil I am planning to add some hydralazine based on blood pressure parameters and reassess blood pressures  SW to assist family and discharge plans.  He was also evaluated for hospice/palliative benefits in the hospital  History     Patient is a very pleasant 73 y.o. male who is admitted to TCU patient was admitted with fever and sepsis.  Source of his fever was felt to be left forearm cellulitis.  Ultrasound of the left upper extremity was negative for any DVT.  He had blood cultures which did not show any growth.  He was treated with IV antibiotics and now has been transitioned to oral Keflex to finish his course of 10-day of antibiotics.    Patient had significant delirium in the hospital.  He is now on scheduled Zyprexa and trazodone  Palliative care consultation was obtained to discuss discharge planning and he has been discharged to the TCU.  Currently discharged on Seroquel.    He has  advanced chronic kidney disease.  Renal function on admission included a creatinine of 3.8 it improved to 2.6 with a baseline creatinine ranging between 2-3 he has underlying history of MGUS and proteinuria however biopsy was negative.    He is a diabetic. blood sugars are somewhat elevated but he states he is not eating very well as yet  He is a diabetic and is on insulin as well as insulin sliding scale.    Patient on exam today had significant slurring of speech.  There was increasing speech difficulties noted in the hospital brain MRI was negative for an acute CVA he did have a routine EEG done in the hospital to  Discharge to the TCU for strengthening and rehab quite upset about his dysphagia diet and wants to be upgraded to a regular diet.  Remains weak and wheelchair-bound      Past Medical History     Active Ambulatory (Non-Hospital) Problems    Diagnosis   ? Elevated liver enzymes   ? Diabetes mellitus (H)   ? Hypertension   ? Stroke (H)   ? Chronic kidney disease   ? Hyperlipemia   ? Septic shock (H)     Past Medical History:   Diagnosis Date   ? Anemia    ? Arthritis    ? Chronic kidney disease    ? Diabetes mellitus (H)    ? Hyperlipemia    ? Hypertension    ? Sleep apnea    ? Stroke (H)        Past Social History     Reviewed, and he  reports that  has never smoked. He does not have any smokeless tobacco history on file. He reports that he drinks alcohol. He reports that he does not use drugs.    Family History     Reviewed, and includes hypertension and heart disease in his mother.  His brother had cancer and  at 33. 1 of his brother had some blood disease with asthma as per him with allergies  1 of his daughter has psychiatric illness.  Father had diabetes and heart disease  Medication List     Current Outpatient Medications on File Prior to Visit   Medication Sig Dispense Refill   ? acetaminophen (TYLENOL) 500 MG tablet Take 1,000 mg by mouth 3 (three) times a day.     ? aspirin 81 MG EC tablet  Take 81 mg by mouth daily.     ? atorvastatin (LIPITOR) 20 MG tablet Take 20 mg by mouth daily.     ? calcitriol (ROCALTROL) 0.25 MCG capsule Take 0.25 mcg by mouth daily.     ? dextroamphetamine-amphetamine (ADDERALL) 20 mg Tab Take 20 mg by mouth 3 (three) times a day. 0800, 1200, 1500     ? doxepin (SINEQUAN) 10 MG capsule Take 10 mg by mouth bedtime.     ? ferrous sulfate 325 (65 FE) MG tablet Take 1 tablet by mouth 2 (two) times a day with meals.      ? fluticasone (FLONASE) 50 mcg/actuation nasal spray 1 spray into each nostril 2 (two) times a day.      ? gabapentin (NEURONTIN) 300 MG capsule Take 600 mg by mouth 2 (two) times a day.            ? glucosamine/chondro schaefer A/C/Mn (GLUCOSAMINE-CHONDROITIN COMPLX ORAL) Take 1 tablet by mouth 2 (two) times a day.     ? insulin aspart U-100 (NOVOLOG) 100 unit/mL injection Inject under the skin 3 (three) times a day with meals. Per sliding scale; =0 units, 150-199=2 units, 200-249=4 units, 250-299=6 units, 300-349=8 units, 350-399=10 units, 400+=12 units, >400 call MD     ? loratadine (CLARITIN) 10 mg tablet Take 10 mg by mouth daily as needed for allergies.     ? losartan (COZAAR) 100 MG tablet Take 100 mg by mouth daily.     ? meclizine (ANTIVERT) 25 mg tablet Take 25 mg by mouth 2 (two) times a day.     ? metoprolol tartrate (LOPRESSOR) 25 MG tablet Take 25 mg by mouth 2 (two) times a day with meals.     ? multivitamin therapeutic (THERAGRAN) tablet Take 1 tablet by mouth daily.     ? omeprazole (PRILOSEC) 20 MG capsule Take 20 mg by mouth daily.     ? sertraline (ZOLOFT) 50 MG tablet Take 50 mg by mouth daily.       No current facility-administered medications on file prior to visit.        Allergies     Allergies   Allergen Reactions   ? Lisinopril Other (See Comments)     Kidney disfunction   ? Armodafinil Rash       Review of Systems   A comprehensive review of 14 systems was done. Pertinent findings noted here and in history of present illness. All the  rest negative.  Constitutional: Negative.  Negative for fever, chills, he has activity change, appetite change and fatigue.   HENT: Negative for congestion and facial swelling.    Eyes: Negative for photophobia, redness and visual disturbance.   Respiratory: Negative for cough and chest tightness.    Cardiovascular: Negative for chest pain, palpitations and leg swelling.   Gastrointestinal: Negative for nausea, diarrhea, constipation, blood in stool and abdominal distention.   Genitourinary: Negative.    Musculoskeletal: Negative.  Has swelling in his legs and arms and has some sores which are healing    Skin: Negative.    Neurological: Negative for dizziness, tremors, syncope, has weakness, light-headedness and headaches.   Hematological: Does not bruise/bleed easily.   Psychiatric/Behavioral: Negative.  Confused with limited recall has some slurring of speech       Physical Exam     Recent Vitals 8/1/2015   Height -   Weight -   BSA (m2) -   /74   Pulse 60   Temp 98.1   Temp src 1   SpO2 97   Some recent data might be hidden   Blood pressure 170/79 pulse 80 temp 98    Constitutional: Oriented to person, place, and time and appears well-developed. Obese  HEENT:  Normocephalic and atraumatic.  Eyes: Conjunctivae and EOM are normal. Pupils are equal, round, and reactive to light. No discharge.  No scleral icterus. Nose normal. Mouth/Throat: Oropharynx is clear and moist. No oropharyngeal exudate.    NECK: Normal range of motion. Neck supple. No JVD present. No tracheal deviation present. No thyromegaly present.   CARDIOVASCULAR: Normal rate, regular rhythm and intact distal pulses.  Exam reveals no gallop and no friction rub.  Systolic murmur present.  PULMONARY: Effort normal and breath sounds normal. No respiratory distress.No Wheezing or rales.  ABDOMEN: Soft. Bowel sounds are normal. No distension and no mass.  There is no tenderness. There is no rebound and no guarding. No HSM.  MUSCULOSKELETAL: Normal  range of motion.  Has left upper extremity edema and no tenderness. Mild kyphosis, no tenderness.  Some redness noted of the left forearm  Edema of both legs  LYMPH NODES: Has no cervical, supraclavicular, axillary and groin adenopathy.   NEUROLOGICAL: Alert and oriented to person, place, and time. No cranial nerve deficit.  Normal muscle tone. Coordination normal.   Has left-sided hemiparesis  Slurring of speech with word finding difficulty  GENITOURINARY: Deferred exam.  SKIN: Skin is warm and dry. No rash noted. No erythema. No pallor.   EXTREMITIES: No cyanosis, no clubbing, no edema. No Deformity.  PSYCHIATRIC: Normal mood, affect and behavior.  Some confusion      Lab Results       Lab Results   Component Value Date    ALBUMIN 2.4 (L) 08/01/2015    ALT 85 (H) 08/01/2015     (H) 08/01/2015    BUN 65 (H) 08/01/2015    CALCIUM 8.1 (L) 08/01/2015     (H) 08/01/2015    CO2 18 (L) 08/01/2015    CREATININE 3.11 (H) 08/01/2015    GFRAA 24 (L) 08/01/2015    GFRNONAA 20 (L) 08/01/2015    GLU 46 (LL) 08/01/2015    HCT 34.9 (L) 01/11/2019    WBC 9.9 01/11/2019    HGB 11.1 (L) 01/11/2019     01/11/2019    K 3.8 08/01/2015     08/01/2015     Recent Labs   01/19/19  0709 01/18/19  0715   SODIUM 141 139   POTASSIUM 5.0 4.7   CHLORIDE 111 H 111 H   [id number] 22 18 L   BUN 50 H 51 H   CREATININE 2.68 H 2.73 H   CALCIUM 9.0 9.0   Recent Labs   01/18/19  0715 01/17/19  0635 01/14/19  1435 01/11/19  1842 01/06/19  0705   INR -- -- -- 1.0 -- --   ALKPHOSPH -- -- -- 168 H -- --   WBC -- -- -- 8.6 -- 5.3   HGB 9.3 L -- -- 10.2 L < > 10.6 L   PLT -- 285 194 177 < > 171         Imaging Results     MRN 1/15/19 was negative for any CVA          TING Valdivia

## 2021-06-23 NOTE — PROGRESS NOTES
[facility]      Facility:    [facility] [[id number]]  Code Status: POLST AVAILABLE      Chief Complaint/Reason for Visit:     Chief Complaint   Patient presents with     Review Of Multiple Medical Conditions       HPI:   Austin is a 73 y.o. male who is recent transfer from [facility] with an admission on 1/11/19 and discharge on 1/19/19.  He has a past medical history of hypertension, diabetes type 2, CVA, chronic kidney disease stage III, PRINCE on CPAP who presented to the ER for evaluation of fever.  He was found to be in sepsis due to left forearm cell cellulitis, negative for DVT.  He was started on vancomycin and transitioned to Keflex.  Per his chronic kidney disease admission it was 3.8 and improved to 2.6, baseline 2 to 3.  His blood sugars were somewhat elevated though he states he does not eat well, maintain on sliding scale., imaging was negative and discharged on a modified diet.  He also had issues with speech so during his hospital stay he developed significant delirium, given scheduled Zyprexa and trazodone.  He was seen by palliative care per being a vulnerable adult and was discharged to TCU for possible disposition change per being unable to care for self.    He has limited concerns today though does not like his modified diet, though continues to increase in his appetite.  His blood sugars seem quite variable and ongoing hypertension. Patient denies pain, headache, chest pain, numbness or tingling, shortest of breath, eating or swallowing concerns, nausea or vomiting, diarrhea or bowel abnormalities, or no new integumentary concerns today.      Past Medical History:  Past Medical History:   Diagnosis Date     Allergic rhinitis      Anemia      Arthritis      Cavus deformity of foot      Cervical spinal stenosis      Chronic kidney disease      Closed fracture of multiple ribs of right side      Colon polyp      Diabetes mellitus (H)       Diabetic neuropathy (H)      Hyperkalemia      Hyperlipemia      Hypertension      Hypoglycemia      Metabolic acidosis      MGUS (monoclonal gammopathy of unknown significance)      Sepsis (H)      Sleep apnea      Stroke (H)            Surgical History:  Past Surgical History:   Procedure Laterality Date     ASD REPAIR  2001     CARDIAC SURGERY       JOINT REPLACEMENT Left 02/11/2014    knee     JOINT REPLACEMENT Right 2005    hip     SINUS SURGERY Left 03/28/2007    Left anterior ethmoidectomy and left maxillary antrostomy       Family History:   Family History   Problem Relation Age of Onset     Allergic rhinitis Mother      Anesthesia problems Mother      Arthritis Mother      Heart disease Mother      Hypertension Mother      Arthritis Father      Diabetes Father      Heart disease Father      Allergies Brother      Asthma Brother      Cancer Brother      Allergies Maternal Aunt      Arthritis Maternal Aunt      Heart disease Maternal Aunt      Hypertension Maternal Aunt        Social History:    Social History     Socioeconomic History     Marital status:      Spouse name: Not on file     Number of children: Not on file     Years of education: Not on file     Highest education level: Not on file   Social Needs     Financial resource strain: Not on file     Food insecurity - worry: Not on file     Food insecurity - inability: Not on file     Transportation needs - medical: Not on file     Transportation needs - non-medical: Not on file   Occupational History     Not on file   Tobacco Use     Smoking status: Never Smoker     Smokeless tobacco: Never Used   Substance and Sexual Activity     Alcohol use: Yes     Comment: 1 drink per week     Drug use: No     Sexual activity: Not on file   Other Topics Concern     Not on file   Social History Narrative     Not on file          Review of Systems   Constitutional: Positive for activity change. Negative for appetite change, chills, diaphoresis and fatigue.    HENT: Negative for congestion and hearing loss.    Eyes: Negative for photophobia, redness and visual disturbance.   Respiratory: Negative for choking, shortness of breath and wheezing.    Cardiovascular: Positive for leg swelling.   Gastrointestinal: Negative for abdominal distention, abdominal pain, blood in stool, constipation and diarrhea.   Endocrine: Negative.    Genitourinary: Negative for penile pain.   Musculoskeletal:        Denies pain   Allergic/Immunologic: Negative.    Neurological: Positive for speech difficulty. Negative for headaches.   Hematological: Negative.    Psychiatric/Behavioral: Positive for confusion. Negative for agitation, hallucinations and sleep disturbance. The patient is not nervous/anxious.        Vitals:    01/23/19 1702   BP: 177/79   Pulse: 71   Resp: 16   Temp: 98  F (36.7  C)   SpO2: 93%       Physical Exam   Constitutional: No distress.   No acute issues   HENT:   Head: Normocephalic and atraumatic.   Mouth/Throat: Oropharynx is clear and moist. No oropharyngeal exudate.   Eyes: Right eye exhibits no discharge. Left eye exhibits no discharge. No scleral icterus.   Neck: No JVD present.   Limited mobility   Cardiovascular: Normal rate.   Pulmonary/Chest: No stridor. No respiratory distress. He has no wheezes. He has rales.   Faint bibasilar crackles, RA   Abdominal: Soft. Bowel sounds are normal. He exhibits no distension. There is no tenderness. There is no rebound.   Denies constipation or diarrhea   Genitourinary:   Genitourinary Comments: deferred   Musculoskeletal: He exhibits edema.   2+  BLE,  wheelchair-bound, kyphotic   Neurological: He is alert. A cranial nerve deficit is present.   Alert and oriented x2, has limited recall, L hemiparesis   Skin: Skin is warm and dry. He is not diaphoretic. There is erythema.   Reddened left arm   Psychiatric:   Stoic, denies anxiety or depression   Vitals reviewed.      Medication List:  Current Outpatient Medications   Medication  Sig     acetaminophen (TYLENOL) 500 MG tablet Take 1,000 mg by mouth 3 (three) times a day.     atorvastatin (LIPITOR) 20 MG tablet Take 20 mg by mouth daily.     calcitriol (ROCALTROL) 0.25 MCG capsule Take 0.25 mcg by mouth daily.     dextroamphetamine-amphetamine (ADDERALL) 20 mg Tab Take 20 mg by mouth 3 (three) times a day. 0800, 1200, 1500     ferrous sulfate 325 (65 FE) MG tablet Take 1 tablet by mouth 2 (two) times a day with meals.      fluticasone (FLONASE) 50 mcg/actuation nasal spray 1 spray into each nostril 2 (two) times a day.      gabapentin (NEURONTIN) 300 MG capsule Take 600 mg by mouth 2 (two) times a day.            glucosamine/chondro schaefer A/C/Mn (GLUCOSAMINE-CHONDROITIN COMPLX ORAL) Take 1 tablet by mouth 2 (two) times a day.     insulin aspart U-100 (NOVOLOG) 100 unit/mL injection Inject under the skin 3 (three) times a day with meals. Per sliding scale;           insulin detemir U-100 (LEVEMIR) 100 unit/mL injection Inject 6 Units under the skin at bedtime.     lidocaine (LIDODERM) 5 % Place 1 patch on the skin daily. Remove & Discard patch within 12 hours or as directed by MD     loratadine (CLARITIN) 10 mg tablet Take 10 mg by mouth daily.            metoprolol tartrate (LOPRESSOR) 25 MG tablet Take 75 mg by mouth 2 (two) times a day.            minoxidil (LONITEN) 2.5 MG tablet Take 5 mg by mouth daily.     mirabegron (MYRBETRIQ) 25 mg Tb24 ER tablet Take 25 mg by mouth daily.     multivitamin therapeutic (THERAGRAN) tablet Take 1 tablet by mouth daily.     omeprazole (PRILOSEC) 20 MG capsule Take 20 mg by mouth daily.     QUEtiapine (SEROQUEL) 25 MG tablet Take 25 mg by mouth at bedtime.     sodium bicarbonate 650 MG tablet Take 1,300 mg by mouth 4 (four) times a day. OTC product     solifenacin (VESICARE) 5 MG tablet Take 10 mg by mouth daily.       Labs:  Na 141  K 5.0  BUN 50  Cr 2.68  GFR 24  Hgb 9.3  MCV 96  Plt 285  Alb 2.7  Vit B12 891  Ferritin 1500  ALT 30  AST 36  A1c  9.1    Assessment/Plan:    Left upper extremity cellulitis: Given Keflex, and Abx in the hospital, monitor    Anemia of chronic disease: Continue ferrous sulfate 325 mg twice daily, last ferritin 1500+, last Hgb 9.3, possibly f/u with nephrology per erythropoietin    Neuropathy: Continue gabapentin 600 mg twice daily    Pain control: Continue Tylenol 1000 mg 3 times daily and lidocaine pain, monitor    Chronic kidney disease stage III: Continue sodium bicarbonate 1300 mg 4 times daily, calcitriol 0.5 mcg daily, taken off Lasix in hospital per renal function, last CR 2.64 with GFR 24, recently put on 2L fluid restriction    Delirium: was on zypreza and trazodone in hospital, which has been discontinued, continue quetiapine 25 mg at bedtime    Dysphasia:  continues with mechanical soft with nectar thick, speech following    GERD: Continue omeprazole 20 mg daily and mirabegron 25mg daily    HTN: Continue metoprolol 75 mg twice daily and minoxidil 5mg daily, monitor blood pressure twice daily times 5 days, plan to start hydralazine if needed    Diabetes type 2: Continue Levemir 6 units at bedtime and sliding scale, last A1c 9.1    Attention deficit disorder: Continue Adderall 20 mg 3 times daily    Disposition: Is a vulnerable adult, probably will need alternative placement    The care plan has been reviewed and all orders signed. Changes to care plan, if any, as noted. Otherwise, continue care plan of care.  The total time spent with this patient was 45 minutes, with greater than 50% spent in counseling and coordination of care that included multiple issues per review of care.    Electronically signed by: Juaquin Caldwell NP

## 2021-06-23 NOTE — PROGRESS NOTES
[facility] Admission note      Patient: Austin Walker  MRN: [id number]  Date of Service: 1/21/2019      [facility] [[id number]]  Reason for Visit     Chief Complaint   Patient presents with     H & P       Code Status     FULL CODE    Assessment   Left upper extremity cellulitis  Sepsis  Altered mental status  Vulnerable adult status  GUZMAN in the setting of chronic kidney disease with a baseline creatinine ranging from 2-3  Hyponatremia  MGUS with repeat biopsy of the kidney in the hospital did not reveal any MGUS related disease  Diabetes type 2 on insulin at home poorly controlled  Hypertension  Chronic anemia of chronic disease  PRINCE  History of CVA in 2001.  Has significant word finding difficulty  Profound deconditioning the patient declining at home refusing outside help  Left-sided chest wall pain and rib pain  Chronic pain of the back including cervical and lumbar spine  Delirium in the hospital  History of diabetic polyneuropathy  Plan     Patient was medically stabilized  He was treated for his cellulitis with antibiotics.  Due to worsening renal function he has been taken off diuretics with advice for close monitoring.  Repeat MRI of the brain was done due to increased slurring of speech but was negative  Overall diabetic control was optimized  He was put on a modified diet and is very upset due to which is not eating well as per him so we will defer further diabetic management until his diet is upgraded at his request.  Started on insulin sliding scale based on blood sugars  He has had elevated A1c in the past but his blood sugars are quite brittle in the hospital.  To monitor trends before adjusting  Due to altered mental status he remains on Seroquel  Other medication that he was given in the hospital including Zyprexa and trazodone have been discontinued continue to monitor mood and behaviors closely  It seems that he is been declining at home and refusing outside  support care conference done with the family and it is recommended that he should look for either help or placement social work to assist him in the meantime to be in the TCU for strengthening  Discontinue glucosamine/chondroitin due to polypharmacy concerns and no proven benefit  Monitor his fluid status closely as he has been taken off Lasix in the hospital.  We will put him on 2 L of fluid restriction and monitor weights closely  Monitor kidney functions closely before putting him back on any diuretics.  He remains on iron supplementation due to malabsorption concerns even though he has anemia of chronic disease.  He needs to see nephrology to be considered for erythropoietin.  He has elevated blood pressures primarily systolic and remains on metoprolol and minoxidil I am planning to add some hydralazine based on blood pressure parameters and reassess blood pressures  SW to assist family and discharge plans.  He was also evaluated for hospice/palliative benefits in the hospital  History     Patient is a very pleasant 73 y.o. male who is admitted to TCU patient was admitted with fever and sepsis.  Source of his fever was felt to be left forearm cellulitis.  Ultrasound of the left upper extremity was negative for any DVT.  He had blood cultures which did not show any growth.  He was treated with IV antibiotics and now has been transitioned to oral Keflex to finish his course of 10-day of antibiotics.    Patient had significant delirium in the hospital.  He is now on scheduled Zyprexa and trazodone  Palliative care consultation was obtained to discuss discharge planning and he has been discharged to the TCU.  Currently discharged on Seroquel.    He has advanced chronic kidney disease.  Renal function on admission included a creatinine of 3.8 it improved to 2.6 with a baseline creatinine ranging between 2-3 he has underlying history of MGUS and proteinuria however biopsy was negative.    He is a diabetic. blood  sugars are somewhat elevated but he states he is not eating very well as yet  He is a diabetic and is on insulin as well as insulin sliding scale.    Patient on exam today had significant slurring of speech.  There was increasing speech difficulties noted in the hospital brain MRI was negative for an acute CVA he did have a routine EEG done in the hospital to  Discharge to the TCU for strengthening and rehab quite upset about his dysphagia diet and wants to be upgraded to a regular diet.  Remains weak and wheelchair-bound      Past Medical History     Active Ambulatory (Non-Hospital) Problems    Diagnosis     Elevated liver enzymes     Diabetes mellitus (H)     Hypertension     Stroke (H)     Chronic kidney disease     Hyperlipemia     Septic shock (H)     Past Medical History:   Diagnosis Date     Anemia      Arthritis      Chronic kidney disease      Diabetes mellitus (H)      Hyperlipemia      Hypertension      Sleep apnea      Stroke (H)        Past Social History     Reviewed, and he  reports that  has never smoked. He does not have any smokeless tobacco history on file. He reports that he drinks alcohol. He reports that he does not use drugs.    Family History     Reviewed, and includes hypertension and heart disease in his mother.  His brother had cancer and  at 33. 1 of his brother had some blood disease with asthma as per him with allergies  1 of his daughter has psychiatric illness.  Father had diabetes and heart disease  Medication List     Current Outpatient Medications on File Prior to Visit   Medication Sig Dispense Refill     acetaminophen (TYLENOL) 500 MG tablet Take 1,000 mg by mouth 3 (three) times a day.       aspirin 81 MG EC tablet Take 81 mg by mouth daily.       atorvastatin (LIPITOR) 20 MG tablet Take 20 mg by mouth daily.       calcitriol (ROCALTROL) 0.25 MCG capsule Take 0.25 mcg by mouth daily.       dextroamphetamine-amphetamine (ADDERALL) 20 mg Tab Take 20 mg by mouth 3 (three) times  a day. 0800, 1200, 1500       doxepin (SINEQUAN) 10 MG capsule Take 10 mg by mouth bedtime.       ferrous sulfate 325 (65 FE) MG tablet Take 1 tablet by mouth 2 (two) times a day with meals.        fluticasone (FLONASE) 50 mcg/actuation nasal spray 1 spray into each nostril 2 (two) times a day.        gabapentin (NEURONTIN) 300 MG capsule Take 600 mg by mouth 2 (two) times a day.              glucosamine/chondro schaefer A/C/Mn (GLUCOSAMINE-CHONDROITIN COMPLX ORAL) Take 1 tablet by mouth 2 (two) times a day.       insulin aspart U-100 (NOVOLOG) 100 unit/mL injection Inject under the skin 3 (three) times a day with meals. Per sliding scale; =0 units, 150-199=2 units, 200-249=4 units, 250-299=6 units, 300-349=8 units, 350-399=10 units, 400+=12 units, >400 call MD       loratadine (CLARITIN) 10 mg tablet Take 10 mg by mouth daily as needed for allergies.       losartan (COZAAR) 100 MG tablet Take 100 mg by mouth daily.       meclizine (ANTIVERT) 25 mg tablet Take 25 mg by mouth 2 (two) times a day.       metoprolol tartrate (LOPRESSOR) 25 MG tablet Take 25 mg by mouth 2 (two) times a day with meals.       multivitamin therapeutic (THERAGRAN) tablet Take 1 tablet by mouth daily.       omeprazole (PRILOSEC) 20 MG capsule Take 20 mg by mouth daily.       sertraline (ZOLOFT) 50 MG tablet Take 50 mg by mouth daily.       No current facility-administered medications on file prior to visit.        Allergies     Allergies   Allergen Reactions     Lisinopril Other (See Comments)     Kidney disfunction     Armodafinil Rash       Review of Systems   A comprehensive review of 14 systems was done. Pertinent findings noted here and in history of present illness. All the rest negative.  Constitutional: Negative.  Negative for fever, chills, he has activity change, appetite change and fatigue.   HENT: Negative for congestion and facial swelling.    Eyes: Negative for photophobia, redness and visual disturbance.   Respiratory: Negative  for cough and chest tightness.    Cardiovascular: Negative for chest pain, palpitations and leg swelling.   Gastrointestinal: Negative for nausea, diarrhea, constipation, blood in stool and abdominal distention.   Genitourinary: Negative.    Musculoskeletal: Negative.  Has swelling in his legs and arms and has some sores which are healing    Skin: Negative.    Neurological: Negative for dizziness, tremors, syncope, has weakness, light-headedness and headaches.   Hematological: Does not bruise/bleed easily.   Psychiatric/Behavioral: Negative.  Confused with limited recall has some slurring of speech       Physical Exam     Recent Vitals 8/1/2015   Height -   Weight -   BSA (m2) -   /74   Pulse 60   Temp 98.1   Temp src 1   SpO2 97   Some recent data might be hidden   Blood pressure 170/79 pulse 80 temp 98    Constitutional: Oriented to person, place, and time and appears well-developed. Obese  HEENT:  Normocephalic and atraumatic.  Eyes: Conjunctivae and EOM are normal. Pupils are equal, round, and reactive to light. No discharge.  No scleral icterus. Nose normal. Mouth/Throat: Oropharynx is clear and moist. No oropharyngeal exudate.    NECK: Normal range of motion. Neck supple. No JVD present. No tracheal deviation present. No thyromegaly present.   CARDIOVASCULAR: Normal rate, regular rhythm and intact distal pulses.  Exam reveals no gallop and no friction rub.  Systolic murmur present.  PULMONARY: Effort normal and breath sounds normal. No respiratory distress.No Wheezing or rales.  ABDOMEN: Soft. Bowel sounds are normal. No distension and no mass.  There is no tenderness. There is no rebound and no guarding. No HSM.  MUSCULOSKELETAL: Normal range of motion.  Has left upper extremity edema and no tenderness. Mild kyphosis, no tenderness.  Some redness noted of the left forearm  Edema of both legs  LYMPH NODES: Has no cervical, supraclavicular, axillary and groin adenopathy.   NEUROLOGICAL: Alert and oriented  to person, place, and time. No cranial nerve deficit.  Normal muscle tone. Coordination normal.   Has left-sided hemiparesis  Slurring of speech with word finding difficulty  GENITOURINARY: Deferred exam.  SKIN: Skin is warm and dry. No rash noted. No erythema. No pallor.   EXTREMITIES: No cyanosis, no clubbing, no edema. No Deformity.  PSYCHIATRIC: Normal mood, affect and behavior.  Some confusion      Lab Results       Lab Results   Component Value Date    ALBUMIN 2.4 (L) 08/01/2015    ALT 85 (H) 08/01/2015     (H) 08/01/2015    BUN 65 (H) 08/01/2015    CALCIUM 8.1 (L) 08/01/2015     (H) 08/01/2015    CO2 18 (L) 08/01/2015    CREATININE 3.11 (H) 08/01/2015    GFRAA 24 (L) 08/01/2015    GFRNONAA 20 (L) 08/01/2015    GLU 46 (LL) 08/01/2015    HCT 34.9 (L) 01/11/2019    WBC 9.9 01/11/2019    HGB 11.1 (L) 01/11/2019     01/11/2019    K 3.8 08/01/2015     08/01/2015     Recent Labs   01/19/19  0709 01/18/19  0715   SODIUM 141 139   POTASSIUM 5.0 4.7   CHLORIDE 111 H 111 H   [id number] 22 18 L   BUN 50 H 51 H   CREATININE 2.68 H 2.73 H   CALCIUM 9.0 9.0   Recent Labs   01/18/19  0715 01/17/19  0635 01/14/19  1435 01/11/19  1842 01/06/19  0705   INR -- -- -- 1.0 -- --   ALKPHOSPH -- -- -- 168 H -- --   WBC -- -- -- 8.6 -- 5.3   HGB 9.3 L -- -- 10.2 L < > 10.6 L   PLT -- 285 194 177 < > 171         Imaging Results     MRN 1/15/19 was negative for any CVA          TING Valdivia

## 2023-02-28 NOTE — ED NOTES
Pt arrives from clinic after he was found to be lethargic and unresponsive. BG was 25. Clinic gave patient glucagon and D50. EMS also gave another 12.5 of D50. EMS stated that it took patient a while for him to rouse when they arrived but upon arrival pt is alert and oriented. PT states that he took his morning insulin and then did not eat.   
no

## 2025-06-04 NOTE — TELEPHONE ENCOUNTER
Referred by Sheryl Duff NP on 7/9/2019    Patient is transitioning to comfort cares.     Anemia Services Closed.     Anemia Latest Ref Rng & Units 9/25/2018 10/15/2018 11/21/2018 12/19/2018 2/22/2019 4/10/2019 7/9/2019   Hemoglobin 13.3 - 17.7 g/dL 10.3(L) 11.5(L) 10.9(L) 10.7(L) 8.4(L) 7.3(L) 7.5(L)   TSAT 15 - 46 % 36 44 - - 37 36 30   Ferritin 26 - 388 ng/mL 250 345 - - 445(H) 535(H) 494(H)       Anemia labs discontinued, therapy plans cancelled, removed from active patient list, and referring provider notified.      Otilia Serrato RN   Anemia Services  [facility]  [address] [place] [id number]   [email or web address]   Office : [phone]  Fax: [phone]       Yes

## (undated) RX ORDER — CIPROFLOXACIN 500 MG/1
TABLET, FILM COATED ORAL
Status: DISPENSED
Start: 2018-09-12